# Patient Record
Sex: FEMALE | Race: ASIAN | Employment: FULL TIME | URBAN - METROPOLITAN AREA
[De-identification: names, ages, dates, MRNs, and addresses within clinical notes are randomized per-mention and may not be internally consistent; named-entity substitution may affect disease eponyms.]

---

## 2017-03-24 ENCOUNTER — OFFICE VISIT (OUTPATIENT)
Dept: FAMILY MEDICINE CLINIC | Age: 42
End: 2017-03-24

## 2017-03-24 VITALS
SYSTOLIC BLOOD PRESSURE: 112 MMHG | HEIGHT: 57 IN | TEMPERATURE: 98.1 F | OXYGEN SATURATION: 98 % | BODY MASS INDEX: 28.26 KG/M2 | WEIGHT: 131 LBS | HEART RATE: 92 BPM | RESPIRATION RATE: 14 BRPM | DIASTOLIC BLOOD PRESSURE: 82 MMHG

## 2017-03-24 DIAGNOSIS — Z13.220 SCREENING FOR LIPID DISORDERS: Primary | ICD-10-CM

## 2017-03-24 DIAGNOSIS — G93.9 BRAIN LESION: ICD-10-CM

## 2017-03-24 DIAGNOSIS — G43.909 MIGRAINE WITHOUT STATUS MIGRAINOSUS, NOT INTRACTABLE, UNSPECIFIED MIGRAINE TYPE: ICD-10-CM

## 2017-03-24 DIAGNOSIS — Z13.1 SCREENING FOR DIABETES MELLITUS: ICD-10-CM

## 2017-03-24 PROBLEM — Z22.7 LTBI (LATENT TUBERCULOSIS INFECTION): Status: ACTIVE | Noted: 2017-03-24

## 2017-03-24 NOTE — PATIENT INSTRUCTIONS

## 2017-03-24 NOTE — PROGRESS NOTES
Patient Name: Steve Lopez   MRN: [id number]    Luretha Hammans is a 46 y.o. female who presents with the following: Transferring care from prior PCP Dr. Danae Cyr. Cervical Cancer Screening: UTD, done by OBGYN, records requested. CAD risk factors:  HTN: WNL, no meds  Aspirin 81 mg: not taking  Lipid: due  Lab Results   Component Value Date/Time    Cholesterol, total 168 08/25/2015 02:55 PM    HDL Cholesterol 38 08/25/2015 02:55 PM    LDL, calculated 99 08/25/2015 02:55 PM    VLDL, calculated 31 08/25/2015 02:55 PM    Triglyceride 153 08/25/2015 02:55 PM     DM: due; hx of pre DM. Lab Results   Component Value Date/Time    Hemoglobin A1c 5.9 08/25/2015 02:55 PM     10 year ASCVD risk to date: Will calculate ASCVD risk score pending labs. Chronic headaches and brain mass  Patient reports twice a month headaches that are well controlled with Tylenol. She states that the intensity of her headaches are worse than prior. Denies numbness, vision changes, weakness, fevers. Previously saw neurology due to chronic headaches. MRI of brain showed nonspecific finding with slight rim enhancement. Last MRI in August 2015 with stable changes from prior MRI. Patient was supposed to follow-up in one year but has not done so. Review of Systems   Constitutional: Negative for fever, malaise/fatigue and weight loss. Respiratory: Negative for cough, hemoptysis, shortness of breath and wheezing. Cardiovascular: Negative for chest pain, palpitations, leg swelling and PND. Gastrointestinal: Negative for abdominal pain, constipation, diarrhea, nausea and vomiting. Neurological: Positive for headaches. Negative for dizziness, tingling, tremors, focal weakness, seizures and loss of consciousness. The patient's medications, allergies, past medical history, surgical history, family history and social history were reviewed and updated where appropriate.       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 Social History Main Topics    Smoking status: Never Smoker    Smokeless tobacco: Never Used    Alcohol use No    Drug use: No    Sexual activity: Yes     Partners: Male     Other Topics Concern    Not on file     Social History Narrative           OBJECTIVE    Visit Vitals    /82 (BP 1 Location: Left arm, BP Patient Position: Sitting)    Pulse 92    Temp 98.1 °F (36.7 °C) (Oral)    Resp 14    Ht 4' 9\" (1.448 m)    Wt 131 lb (59.4 kg)    LMP 2017 (Exact Date)    SpO2 98%    BMI 28.35 kg/m2       Physical Exam   Constitutional: She is oriented to person, place, and time and well-developed, well-nourished, and in no distress. No distress. HENT:   Head: Normocephalic and atraumatic. Right Ear: External ear normal.   Left Ear: External ear normal.   Eyes: Conjunctivae and EOM are normal. Pupils are equal, round, and reactive to light. Cardiovascular: Normal rate, regular rhythm and normal heart sounds. Exam reveals no gallop and no friction rub. No murmur heard. Pulmonary/Chest: Effort normal and breath sounds normal. No respiratory distress. She has no wheezes. Neurological: She is alert and oriented to person, place, and time. She has normal reflexes. She displays normal reflexes. No cranial nerve deficit. She exhibits normal muscle tone.  Gait normal. Coordination normal.   Skin: She is not diaphoretic. Psychiatric: Mood, memory, affect and judgment normal.   Nursing note and vitals reviewed. ASSESSMENT AND PLAN  Sussy Corrales is a 46 y.o. female who presents today for:    1. Screening for lipid disorders  Will calculate ASCVD risk score pending labs. - LIPID PANEL  - HEPATIC FUNCTION PANEL    2. Screening for diabetes mellitus  - HEMOGLOBIN A1C WITH EAG    3. Brain lesion  Recommend f/u with neurology for repeat MRI given hx of brain mass.  - REFERRAL TO NEUROLOGY    4. Migraine without status migrainosus, not intractable, unspecified migraine type  Pt reports worsening headaches in intensity but less frequent than prior; would appreciate neurology input.  - REFERRAL TO NEUROLOGY         Medications Discontinued During This Encounter   Medication Reason    methylPREDNISolone (MEDROL DOSEPACK) 4 mg tablet Duplicate Order    albuterol (PROVENTIL HFA, VENTOLIN HFA, PROAIR HFA) 90 mcg/actuation inhaler Not A Current Medication    ibuprofen (MOTRIN) 400 mg tablet Not A Current Medication    loratadine (CLARITIN) 10 mg tablet Not A Current Medication    acetaminophen (TYLENOL EXTRA STRENGTH) 500 mg tablet Not A Current Medication       Follow-up Disposition:  Return in about 1 year (around 3/24/2018) for CPE. Time: 25 minutes was spent with this patient face to face discussing test results, follow up visits, and when repeat testing. I discussed diagnoses, risk factors and treatment for each based on current recommendations and literature. Greater than 50% of total visit time was spent in counseling and coordination of care. Medication risks/benefits/costs/interactions/alternatives discussed with patient. Advised patient to call back or return to office if symptoms worsen/change/persist. If patient cannot reach us or should anything more severe/urgent arise he/she should proceed directly to the nearest emergency department.   Discussed expected course/resolution/complications of diagnosis in detail with patient. Patient given a written after visit summary which includes his/her diagnoses, current medications and vitals. Patient expressed understanding with the diagnosis and plan.      Barb Lind M.D.

## 2017-03-24 NOTE — MR AVS SNAPSHOT
Visit Information Date & Time Provider Department Dept. Phone Encounter #  
 3/24/2017  2:45 PM Lydia Gamboa MD [address] [place] [address] [address] [phone] [id number] Follow-up Instructions Return in about 1 year (around 3/24/2018) for CPE. Upcoming Health Maintenance Date Due  
 PAP AKA CERVICAL CYTOLOGY 4/15/1998 DTaP/Tdap/Td series (2 - Td) 2/19/2025 Allergies as of 3/24/2017  Review Complete On: 3/24/2017 By: Martha Holt LPN No Known Allergies Current Immunizations  Reviewed on 3/17/2015 Name Date Hep B Vaccine 6/24/2014, 1/17/2014, 12/20/2013 Influenza Vaccine 2/19/2015 Influenza Vaccine (Quad) PF 9/19/2016 MMR 1/17/2014, 12/20/2013 Td 2/19/2015 Tdap 6/26/2014 Not reviewed this visit You Were Diagnosed With   
  
 Codes Comments Screening for lipid disorders    -  Primary ICD-10-CM: Z13.220 ICD-9-CM: V77.91 Screening for diabetes mellitus     ICD-10-CM: Z13.1 ICD-9-CM: V77.1 Brain lesion     ICD-10-CM: G93.9 ICD-9-CM: 348.89 Migraine without status migrainosus, not intractable, unspecified migraine type     ICD-10-CM: G43.909 ICD-9-CM: 346.90 Vitals BP Pulse Temp Resp Height(growth percentile) Weight(growth percentile) 112/82 (BP 1 Location: Left arm, BP Patient Position: Sitting) 92 98.1 °F (36.7 °C) (Oral) 14 4' 9\" (1.448 m) 131 lb (59.4 kg) LMP SpO2 BMI OB Status Smoking Status 02/20/2017 (Exact Date) 98% 28.35 kg/m2 Having regular periods Never Smoker Vitals History BMI and BSA Data Body Mass Index Body Surface Area  
 28.35 kg/m 2 1.55 m 2 Preferred Pharmacy Pharmacy Name Phone [facility] [phone] [facility] [phone] Your Updated Medication List  
  
Notice  As of 3/24/2017  2:55 PM  
 You have not been prescribed any medications. We Performed the Following HEMOGLOBIN A1C WITH EAG [52954 CPT(R)] HEPATIC FUNCTION PANEL [33309 CPT(R)] LIPID PANEL [21378 CPT(R)] REFERRAL TO NEUROLOGY [LQT34 Custom] Comments:  
 Please evaluate patient for brain mass, headache. Patient prefers Fridays before 3pm.  
  
Follow-up Instructions Return in about 1 year (around 3/24/2018) for CPE. Referral Information Referral ID Referred By Referred To  
  
 [id number] [facility]ave[facility] Aw, [address] [facility] Margaret Zheng Phone: [phone] Fax: [phone] Visits Status Start Date End Date 1 New Request 3/24/17 3/24/18 If your referral has a status of pending review or denied, additional information will be sent to support the outcome of this decision. Patient Instructions Headache: Care Instructions Your Care Instructions Headaches have many possible causes. Most headaches aren't a sign of a more serious problem, and they will get better on their own. Home treatment may help you feel better faster. The doctor has checked you carefully, but problems can develop later. If you notice any problems or new symptoms, get medical treatment right away. Follow-up care is a key part of your treatment and safety. Be sure to make and go to all appointments, and call your doctor if you are having problems. It's also a good idea to know your test results and keep a list of the medicines you take. How can you care for yourself at home? · Do not drive if you have taken a prescription pain medicine. · Rest in a quiet, dark room until your headache is gone. Close your eyes and try to relax or go to sleep. Don't watch TV or read. · Put a cold, moist cloth or cold pack on the painful area for 10 to 20 minutes at a time. Put a thin cloth between the cold pack and your skin.  
· Use a warm, moist towel or a heating pad set on low to relax tight shoulder and neck muscles. · Have someone gently massage your neck and shoulders. · Take pain medicines exactly as directed. ¨ If the doctor gave you a prescription medicine for pain, take it as prescribed. ¨ If you are not taking a prescription pain medicine, ask your doctor if you can take an over-the-counter medicine. · Be careful not to take pain medicine more often than the instructions allow, because you may get worse or more frequent headaches when the medicine wears off. · Do not ignore new symptoms that occur with a headache, such as a fever, weakness or numbness, vision changes, or confusion. These may be signs of a more serious problem. To prevent headaches · Keep a headache diary so you can figure out what triggers your headaches. Avoiding triggers may help you prevent headaches. Record when each headache began, how long it lasted, and what the pain was like (throbbing, aching, stabbing, or dull). Write down any other symptoms you had with the headache, such as nausea, flashing lights or dark spots, or sensitivity to bright light or loud noise. Note if the headache occurred near your period. List anything that might have triggered the headache, such as certain foods (chocolate, cheese, wine) or odors, smoke, bright light, stress, or lack of sleep. · Find healthy ways to deal with stress. Headaches are most common during or right after stressful times. Take time to relax before and after you do something that has caused a headache in the past. 
· Try to keep your muscles relaxed by keeping good posture. Check your jaw, face, neck, and shoulder muscles for tension, and try relaxing them. When sitting at a desk, change positions often, and stretch for 30 seconds each hour. · Get plenty of sleep and exercise. · Eat regularly and well. Long periods without food can trigger a headache. · Treat yourself to a massage. Some people find that regular massages are very helpful in relieving tension. · Limit caffeine by not drinking too much coffee, tea, or soda. But don't quit caffeine suddenly, because that can also give you headaches. · Reduce eyestrain from computers by blinking frequently and looking away from the computer screen every so often. Make sure you have proper eyewear and that your monitor is set up properly, about an arm's length away. · Seek help if you have depression or anxiety. Your headaches may be linked to these conditions. Treatment can both prevent headaches and help with symptoms of anxiety or depression. When should you call for help? Call 911 anytime you think you may need emergency care. For example, call if: 
· You have signs of a stroke. These may include: 
¨ Sudden numbness, paralysis, or weakness in your face, arm, or leg, especially on only one side of your body. ¨ Sudden vision changes. ¨ Sudden trouble speaking. ¨ Sudden confusion or trouble understanding simple statements. ¨ Sudden problems with walking or balance. ¨ A sudden, severe headache that is different from past headaches. Call your doctor now or seek immediate medical care if: 
· You have a new or worse headache. · Your headache gets much worse. Where can you learn more? Go to http://gómez-william.info/. Enter M271 in the search box to learn more about \"Headache: Care Instructions. \" Current as of: February 19, 2016 Content Version: 11.1 © 4522-5373 Healthwise, Incorporated. Care instructions adapted under license by [employer] (which disclaims liability or warranty for this information). If you have questions about a medical condition or this instruction, always ask your healthcare professional. [place] [address] any warranty or liability for your use of this information. Introducing [facility]!    
 Denys Hall introduces Blackboard patient portal. Now you can access parts of your medical record, email your doctor's office, and request medication refills online. 1. In your internet browser, go to [email or web address] 2. Click on the First Time User? Click Here link in the Sign In box. You will see the New Member Sign Up page. 3. Enter your CensorNet Access Code exactly as it appears below. You will not need to use this code after youve completed the sign-up process. If you do not sign up before the expiration date, you must request a new code. · CensorNet Access Code: [email or web address] Expires: 6/22/2017  2:48 PM 
 
4. Enter the last four digits of your Social Security Number (xxxx) and Date of Birth (mm/dd/yyyy) as indicated and click Submit. You will be taken to the next sign-up page. 5. Create a CensorNet ID. This will be your CensorNet login ID and cannot be changed, so think of one that is secure and easy to remember. 6. Create a CensorNet password. You can change your password at any time. 7. Enter your Password Reset Question and Answer. This can be used at a later time if you forget your password. 8. Enter your e-mail address. You will receive e-mail notification when new information is available in [address] 9. Click Sign Up. You can now view and download portions of your medical record. 10. Click the Download Summary menu link to download a portable copy of your medical information. If you have questions, please visit the Frequently Asked Questions section of the CensorNet website. Remember, CensorNet is NOT to be used for urgent needs. For medical emergencies, dial 911. Now available from your iPhone and Android! Please provide this summary of care documentation to your next provider. Your primary care clinician is listed as Floyd Ventura. If you have any questions after today's visit, please call [phone].

## 2017-03-24 NOTE — PROGRESS NOTES
Patient presents in office today to establish [facility] transfer  Patient requesting cpe today    Chief Complaint   Patient presents with   [facility] Establish Care     [facility] transfer    Complete Physical     annual visit     1. Have you been to the ER, urgent care clinic since your last visit? Hospitalized since your last visit? No    2. Have you seen or consulted any other health care providers outside of the [address] [place] [address] since your last visit? Include any pap smears or colon screening.  No      PHQ 2 / 9, over the last two weeks 8/3/2016   Little interest or pleasure in doing things Not at all   Feeling down, depressed or hopeless Not at all   Total Score PHQ 2 0     Vitals:    03/24/17 1426   BP: 112/82   BP 1 Location: Left arm   BP Patient Position: Sitting   Pulse: 92   Resp: 14   Temp: 98.1 °F (36.7 °C)   TempSrc: Oral   SpO2: 98%   Weight: 131 lb (59.4 kg)   Height: 4' 9\" (1.448 m)

## 2017-03-27 LAB
ALBUMIN SERPL-MCNC: 4.2 G/DL (ref 3.5–5.5)
ALP SERPL-CCNC: 69 IU/L (ref 39–117)
ALT SERPL-CCNC: 14 IU/L (ref 0–32)
AST SERPL-CCNC: 13 IU/L (ref 0–40)
BILIRUB DIRECT SERPL-MCNC: 0.16 MG/DL (ref 0–0.4)
BILIRUB SERPL-MCNC: 0.7 MG/DL (ref 0–1.2)
CHOLEST SERPL-MCNC: 167 MG/DL (ref 100–199)
EST. AVERAGE GLUCOSE BLD GHB EST-MCNC: 117 MG/DL
HBA1C MFR BLD: 5.7 % (ref 4.8–5.6)
HDLC SERPL-MCNC: 50 MG/DL
INTERPRETATION, 910389: NORMAL
LDLC SERPL CALC-MCNC: 101 MG/DL (ref 0–99)
PROT SERPL-MCNC: 6.8 G/DL (ref 6–8.5)
TRIGL SERPL-MCNC: 82 MG/DL (ref 0–149)
VLDLC SERPL CALC-MCNC: 16 MG/DL (ref 5–40)

## 2017-03-27 NOTE — PROGRESS NOTES
Please notify patient regarding their test results (may need to use phone  or pt's  if he is on HIPAA):    Hemoglobin A1C (average blood sugar level for past 3 months) is in the pre diabetes range. I would encourage healthy diets and regular exercise with the goal of healthy weight loss before starting medications for this. Mildly elevated LDL but otherwise lipids look WNL. Pt needs to make appt with neurology as discussed given her brain mass; may need to assist in setting up this appointment.

## 2017-04-10 ENCOUNTER — OFFICE VISIT (OUTPATIENT)
Dept: NEUROLOGY | Age: 42
End: 2017-04-10

## 2017-04-10 DIAGNOSIS — G44.219 EPISODIC TENSION-TYPE HEADACHE, NOT INTRACTABLE: ICD-10-CM

## 2017-04-10 DIAGNOSIS — R93.89 NONSPECIFIC ABNORMAL FINDINGS ON DIAGNOSTIC IMAGING: Primary | ICD-10-CM

## 2017-04-10 NOTE — PROGRESS NOTES
Chief Complaint   Patient presents with    Migraine       HPI    53-year-old woman here to follow-up. I evaluated her last in October 2015. She was diagnosed with a cavernous angioma in 2014. I repeated imaging in 2015 showing stable appearance. She is here for follow-up in general.  Has occasional headaches. She complains of some left ankle pain with weightbearing activity and the same the right proximal thigh. No falls. Has some insomnia. She works at a nursing home. Her primary language is [place]. Review of Systems   Musculoskeletal: Positive for joint pain. Neurological: Positive for headaches. Psychiatric/Behavioral: The patient has insomnia. All other systems reviewed and are negative. 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 Social History Main Topics    Smoking status: Never Smoker    Smokeless tobacco: Never Used    Alcohol use No    Drug use: No    Sexual activity: Yes     Partners: Male     Other Topics Concern    Not on file     Social History Narrative     No Known Allergies      No current outpatient prescriptions on file. No current facility-administered medications for this visit. Neurologic Exam     Mental Status        WD/WN adult in NAD, normal grooming  VSS  A&O x 3    PERRL, nonicteric  Face is symmetric, tongue midline  Speech is fluent and clear  No limb ataxia. No abnl movements.   Moving all extemities spontaneously and symmetric  Normal gait    CVS RRR  Lungs nonlabored  Skin is warm and dry         Visit Vitals  LMP 02/20/2017 (Exact Date)       Assessment and Plan   Author Yfn was seen today for migraine. Diagnoses and all orders for this visit:    Nonspecific abnormal findings on diagnostic imaging  -     MRI BRAIN W WO CONT; Future    Episodic tension-type headache, not intractable      24-year-old woman with lesion suspicious for cavernous angioma based on previous imaging. She is complaining of some left ankle pain. English is not her primary language. She declined having to need an . Suspect the left ankle issue is due to mechanical etiology from walking and standing but given language barrier and the location of the brain lesion we need to have interval imaging done. Headaches seem to be rare. Points to vertex location. Exam is unchanged from last visit. I would like to see her after the imaging is done.           [address] [facility] [address]  Diplomate OBEDN

## 2017-04-10 NOTE — PATIENT INSTRUCTIONS
[facility] Formerly [facility]   Statement to Patients  April 1, 2014      In an effort to ensure the large volume of patient prescription refills is processed in the most efficient and expeditious manner, we are asking our patients to assist us by calling your Pharmacy for all prescription refills, this will include also your  Mail Order Pharmacy. The pharmacy will contact our office electronically to continue the refill process. Please do not wait until the last minute to call your pharmacy. We need at least 48 hours (2days) to fill prescriptions. We also encourage you to call your pharmacy before going to  your prescription to make sure it is ready. With regard to controlled substance prescription refill requests (narcotic refills) that need to be picked up at our office, we ask your cooperation by providing us with at least 72 hours (3days) notice that you will need a refill. We will not refill narcotic prescription refill requests after 4:00pm on any weekday, Monday through Thursday, or after 2:00pm on Fridays, or on the weekends. We encourage everyone to explore another way of getting your prescription refill request processed using [facility] [employer], our patient web portal through our electronic medical record system. [facility] is an efficient and effective way to communicate your medication request directly to the office and  downloadable as an lambert on your smart phone . [facility] also features a review functionality that allows you to view your medication list as well as leave messages for your physician. Are you ready to get connected? If so please review the attatched instructions or speak to any of our staff to get you set up right away! Thank you so much for your cooperation. Should you have any questions please contact our Practice Administrator.     The Physicians and Staff,  [facility]     Thank you for choosing [facility] for your     care. You may receive a survey about your visit. We appreciate you taking time     to complete this survey as we use your feedback to improve our services. We     realize we are not perfect, but we strive to provide excellent care.

## 2017-04-10 NOTE — MR AVS SNAPSHOT
Visit Information Date & Time Provider Department Dept. Phone Encounter #  
 4/10/2017 11:30 AM DO [facility] at [address] [id number] Follow-up Instructions Return in about 2 months (around 6/10/2017), or after MRI. Routing History Upcoming Health Maintenance Date Due  
 PAP AKA CERVICAL CYTOLOGY 4/15/1998 DTaP/Tdap/Td series (2 - Td) 2/19/2025 Allergies as of 4/10/2017  Review Complete On: 4/10/2017 By: Velia Mcintyre LPN No Known Allergies Current Immunizations  Reviewed on 3/17/2015 Name Date Hep B Vaccine 6/24/2014, 1/17/2014, 12/20/2013 Influenza Vaccine 2/19/2015 Influenza Vaccine (Quad) PF 9/19/2016 MMR 1/17/2014, 12/20/2013 Td 2/19/2015 Tdap 6/26/2014 Not reviewed this visit You Were Diagnosed With   
  
 Codes Comments Nonspecific abnormal findings on diagnostic imaging    -  Primary ICD-10-CM: R93.8 ICD-9-CM: 793.99 Episodic tension-type headache, not intractable     ICD-10-CM: S43.869 ICD-9-CM: 339.11 Vitals LMP OB Status Smoking Status 02/20/2017 (Exact Date) Having regular periods Never Smoker Preferred Pharmacy Pharmacy Name Phone [phone] [facility] [phone] Your Updated Medication List  
  
Notice  As of 4/10/2017 11:39 AM  
 You have not been prescribed any medications. Follow-up Instructions Return in about 2 months (around 6/10/2017), or after MRI. To-Do List   
 04/10/2017 Imaging:  MRI BRAIN W WO CONT Patient Instructions PRESCRIPTION REFILL POLICY [facility] Statement to Patients April 1, 2014 In an effort to ensure the large volume of patient prescription refills is processed in the most efficient and expeditious manner, we are asking our patients to assist us by calling your Pharmacy for all prescription refills, this will include also your  Mail Order Pharmacy. The pharmacy will contact our office electronically to continue the refill process. Please do not wait until the last minute to call your pharmacy. We need at least 48 hours (2days) to fill prescriptions. We also encourage you to call your pharmacy before going to  your prescription to make sure it is ready. With regard to controlled substance prescription refill requests (narcotic refills) that need to be picked up at our office, we ask your cooperation by providing us with at least 72 hours (3days) notice that you will need a refill. We will not refill narcotic prescription refill requests after 4:00pm on any weekday, Monday through Thursday, or after 2:00pm on Fridays, or on the weekends. We encourage everyone to explore another way of getting your prescription refill request processed using [employer], our patient web portal through our electronic medical record system. [employer] is an efficient and effective way to communicate your medication request directly to the office and  downloadable as an lambert on your smart phone . [employer] also features a review functionality that allows you to view your medication list as well as leave messages for your physician. Are you ready to get connected? If so please review the attatched instructions or speak to any of our staff to get you set up right away! Thank you so much for your cooperation. Should you have any questions please contact our Practice Administrator. The Physicians and Staff,  [facility] Thank you for choosing [facility] and [facility] for your  
 
care. You may receive a survey about your visit. We appreciate you taking time  
 
to complete this survey as we use your feedback to improve our services.   We  
 
 realize we are not perfect, but we strive to provide excellent care. Introducing [facility]! Elidia Marie introduces [employer] patient portal. Now you can access parts of your medical record, email your doctor's office, and request medication refills online. 1. In your internet browser, go to [email or web address] 2. Click on the First Time User? Click Here link in the Sign In box. You will see the New Member Sign Up page. 3. Enter your [employer] Access Code exactly as it appears below. You will not need to use this code after youve completed the sign-up process. If you do not sign up before the expiration date, you must request a new code. · [employer] Access Code: [email or web address] Expires: 6/22/2017  2:48 PM 
 
4. Enter the last four digits of your Social Security Number (xxxx) and Date of Birth (mm/dd/yyyy) as indicated and click Submit. You will be taken to the next sign-up page. 5. Create a [employer] ID. This will be your [employer] login ID and cannot be changed, so think of one that is secure and easy to remember. 6. Create a [employer] password. You can change your password at any time. 7. Enter your Password Reset Question and Answer. This can be used at a later time if you forget your password. 8. Enter your e-mail address. You will receive e-mail notification when new information is available in [address] 9. Click Sign Up. You can now view and download portions of your medical record. 10. Click the Download Summary menu link to download a portable copy of your medical information. If you have questions, please visit the Frequently Asked Questions section of the [employer] website. Remember, [employer] is NOT to be used for urgent needs. For medical emergencies, dial 911. Now available from your iPhone and Android! Please provide this summary of care documentation to your next provider. Your primary care clinician is listed as Floyd Ventura.  If you have any questions after today's visit, please call [phone].

## 2017-05-17 ENCOUNTER — TELEPHONE (OUTPATIENT)
Dept: FAMILY MEDICINE CLINIC | Age: 42
End: 2017-05-17

## 2017-05-17 DIAGNOSIS — Z02.1 PRE-EMPLOYMENT DRUG SCREENING: Primary | ICD-10-CM

## 2017-05-17 NOTE — TELEPHONE ENCOUNTER
Patients  came in yesterday afternoon and wanted us to fax over the patient's last Chest X-Ray, Lab Results and Urine Test over to fax #[phone]. He said it was important and was hoping to get it sent over as soon as possible. [place] let the patient know when it has been done. Contact number is [phone]. ( I have left the paper in doctors' box, with the fax number and to the place it needs to go to. [facility])Thanks.

## 2017-05-18 NOTE — TELEPHONE ENCOUNTER
Called pt, adv  (TAYA) that he could bring in wife to the lab to have her UDS. 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 Patient/ Caller given an opportunity to ask questions, repeated information, and verbalized understanding.

## 2017-05-18 NOTE — TELEPHONE ENCOUNTER
UDS order placed. Agree with plan to follow up with [facility] regarding TB management (would clarify if pt has active vs latent TB).

## 2017-05-18 NOTE — TELEPHONE ENCOUNTER
Return call from  (DORINDAA), adv him of information below. He stated that his wife is being treated by [facility] for TB, adv him that they would have updated records and possible xrays, etc. If she is being tx, then the school is going to want records from them as well. Gilbert would ask Dr. 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      Routing to PCP for approval/denial/alteration/dose adjustment

## 2017-05-18 NOTE — TELEPHONE ENCOUNTER
Called and LMTCB, her last urine was not for a drug screen nor her chest xray was for tuberculosis screening. Neither of these is appropriate for her to have to comply with the requirements.

## 2017-05-25 LAB — DRUGS UR: NORMAL

## 2017-05-30 NOTE — TELEPHONE ENCOUNTER
Please notify patient regarding their test results:    Negative UDS. Please provide copy of results to pt for employment form. If a form needs to be signed, please let me know.

## 2017-05-30 NOTE — TELEPHONE ENCOUNTER
Called pt, adv that letter could be picked up at  to provide to school along with form. Patient/ Caller given an opportunity to ask questions, repeated information, and verbalized understanding.

## 2017-06-21 ENCOUNTER — OFFICE VISIT (OUTPATIENT)
Dept: FAMILY MEDICINE CLINIC | Age: 42
End: 2017-06-21

## 2017-06-21 VITALS
HEART RATE: 90 BPM | TEMPERATURE: 97.9 F | HEIGHT: 57 IN | DIASTOLIC BLOOD PRESSURE: 96 MMHG | RESPIRATION RATE: 12 BRPM | WEIGHT: 127.4 LBS | BODY MASS INDEX: 27.49 KG/M2 | SYSTOLIC BLOOD PRESSURE: 129 MMHG | OXYGEN SATURATION: 93 %

## 2017-06-21 DIAGNOSIS — G44.219 EPISODIC TENSION-TYPE HEADACHE, NOT INTRACTABLE: Primary | ICD-10-CM

## 2017-06-21 DIAGNOSIS — R03.0 ELEVATED BLOOD PRESSURE READING: ICD-10-CM

## 2017-06-21 DIAGNOSIS — G47.00 INSOMNIA, UNSPECIFIED TYPE: ICD-10-CM

## 2017-06-21 RX ORDER — TRAZODONE HYDROCHLORIDE 50 MG/1
50 TABLET ORAL
Qty: 30 TAB | Refills: 5 | Status: SHIPPED | OUTPATIENT
Start: 2017-06-21 | End: 2017-09-29

## 2017-06-21 NOTE — PATIENT INSTRUCTIONS
Elevated Blood Pressure: Care Instructions  Your Care Instructions    Blood pressure is a measure of how hard the blood pushes against the walls of your arteries. It's normal for blood pressure to go up and down throughout the day. But if it stays up over time, you have high blood pressure. Two numbers tell you your blood pressure. The first number is the systolic pressure. It shows how hard the blood pushes when your heart is pumping. The second number is the diastolic pressure. It shows how hard the blood pushes between heartbeats, when your heart is relaxed and filling with blood. An ideal blood pressure in adults is less than 120/80 (say \"120 over 80\"). High blood pressure is 140/90 or higher. You have high blood pressure if your top number is 140 or higher or your bottom number is 90 or higher, or both. The main test for high blood pressure is simple, fast, and painless. To diagnose high blood pressure, your doctor will test your blood pressure at different times. After testing your blood pressure, your doctor may ask you to test it again when you are home. If you are diagnosed with high blood pressure, you can work with your doctor to make a long-term plan to manage it. Follow-up care is a key part of your treatment and safety. Be sure to make and go to all appointments, and call your doctor if you are having problems. It's also a good idea to know your test results and keep a list of the medicines you take. How can you care for yourself at home? · Do not smoke. Smoking increases your risk for heart attack and stroke. If you need help quitting, talk to your doctor about stop-smoking programs and medicines. These can increase your chances of quitting for good. · Stay at a healthy weight. · Try to limit how much sodium you eat to less than 2,300 milligrams (mg) a day. Your doctor may ask you to try to eat less than 1,500 mg a day. · Be physically active.  Get at least 30 minutes of exercise on most days of the week. Walking is a good choice. You also may want to do other activities, such as running, swimming, cycling, or playing tennis or team sports. · Avoid or limit alcohol. Talk to your doctor about whether you can drink any alcohol. · Eat plenty of fruits, vegetables, and low-fat dairy products. Eat less saturated and total fats. · Learn how to check your blood pressure at home. When should you call for help? Call your doctor now or seek immediate medical care if:  · Your blood pressure is much higher than normal (such as 180/110 or higher). · You think high blood pressure is causing symptoms such as:  ¨ Severe headache. ¨ Blurry vision. Watch closely for changes in your health, and be sure to contact your doctor if:  · You do not get better as expected. Where can you learn more? Go to http://gómez-william.info/. Enter G441 in the search box to learn more about \"Elevated Blood Pressure: Care Instructions. \"  Current as of: April 3, 2017  Content Version: 11.3  © 9218-9022 [employer]. Care instructions adapted under license by [employer] (which disclaims liability or warranty for this information). If you have questions about a medical condition or this instruction, always ask your healthcare professional. [address] any warranty or liability for your use of this information.

## 2017-06-21 NOTE — PROGRESS NOTES
1. Have you been to the ER, urgent care clinic since your last visit? Hospitalized since your last visit? No    2. Have you seen or consulted any other health care providers outside of the [address] [place] [address] since your last visit? Include any pap smears or colon screening.  No       Chief Complaint   Patient presents with    Fever     had a low grade fever 6/20/17    Hypertension     has had a mild headache for the last 2-3 days blood pressure has been elevated    Chest Pain     for the last 2-3 days

## 2017-06-21 NOTE — MR AVS SNAPSHOT
Visit Information Date & Time Provider Department Dept. Phone Encounter #  
 6/21/2017  8:45 AM Xuan Snider NP [facility] [phone] [id number] Follow-up Instructions Return in about 4 weeks (around 7/19/2017) for blood pressure. Upcoming Health Maintenance Date Due  
 PAP AKA CERVICAL CYTOLOGY 4/15/1998 INFLUENZA AGE 9 TO ADULT 8/1/2017 DTaP/Tdap/Td series (2 - Td) 2/19/2025 Allergies as of 6/21/2017  Review Complete On: 6/21/2017 By: Xuan Snider NP No Known Allergies Current Immunizations  Reviewed on 3/17/2015 Name Date Hep B Vaccine 6/24/2014, 1/17/2014, 12/20/2013 Influenza Vaccine 2/19/2015 Influenza Vaccine (Quad) PF 9/19/2016 MMR 1/17/2014, 12/20/2013 Td 2/19/2015 Tdap 6/26/2014 Not reviewed this visit You Were Diagnosed With   
  
 Codes Comments Episodic tension-type headache, not intractable    -  Primary ICD-10-CM: W05.986 ICD-9-CM: 339.11 Elevated blood pressure reading     ICD-10-CM: R03.0 ICD-9-CM: 796.2 Insomnia, unspecified type     ICD-10-CM: G47.00 ICD-9-CM: 780.52 Vitals BP Pulse Temp Resp Height(growth percentile) Weight(growth percentile) (!) 129/96 (BP 1 Location: Right arm, BP Patient Position: Sitting) 90 97.9 °F (36.6 °C) (Oral) 12 4' 9\" (1.448 m) 127 lb 6.4 oz (57.8 kg) LMP SpO2 BMI OB Status Smoking Status 05/12/2017 (Exact Date) 93% 27.57 kg/m2 Having regular periods Never Smoker Vitals History BMI and BSA Data Body Mass Index Body Surface Area  
 27.57 kg/m 2 1.52 m 2 Preferred Pharmacy Pharmacy Name Phone [phone] [facility] [phone] Your Updated Medication List  
  
   
This list is accurate as of: 6/21/17  9:26 AM.  Always use your most recent med list.  
  
  
  
  
 traZODone 50 mg tablet Commonly known as:  Shai Lim  
 Take 1 Tab by mouth nightly. For sleep Prescriptions Sent to Pharmacy Refills  
 traZODone (DESYREL) 50 mg tablet 5 Sig: Take 1 Tab by mouth nightly. For sleep Class: Normal  
 Pharmacy: [facility] [address] [facility] #: [phone] Route: Oral  
  
Follow-up Instructions Return in about 4 weeks (around 7/19/2017) for blood pressure. Patient Instructions Elevated Blood Pressure: Care Instructions Your Care Instructions Blood pressure is a measure of how hard the blood pushes against the walls of your arteries. It's normal for blood pressure to go up and down throughout the day. But if it stays up over time, you have high blood pressure. Two numbers tell you your blood pressure. The first number is the systolic pressure. It shows how hard the blood pushes when your heart is pumping. The second number is the diastolic pressure. It shows how hard the blood pushes between heartbeats, when your heart is relaxed and filling with blood. An ideal blood pressure in adults is less than 120/80 (say \"120 over 80\"). High blood pressure is 140/90 or higher. You have high blood pressure if your top number is 140 or higher or your bottom number is 90 or higher, or both. The main test for high blood pressure is simple, fast, and painless. To diagnose high blood pressure, your doctor will test your blood pressure at different times. After testing your blood pressure, your doctor may ask you to test it again when you are home. If you are diagnosed with high blood pressure, you can work with your doctor to make a long-term plan to manage it. Follow-up care is a key part of your treatment and safety. Be sure to make and go to all appointments, and call your doctor if you are having problems. It's also a good idea to know your test results and keep a list of the medicines you take. How can you care for yourself at home? · Do not smoke. Smoking increases your risk for heart attack and stroke. If you need help quitting, talk to your doctor about stop-smoking programs and medicines. These can increase your chances of quitting for good. · Stay at a healthy weight. · Try to limit how much sodium you eat to less than 2,300 milligrams (mg) a day. Your doctor may ask you to try to eat less than 1,500 mg a day. · Be physically active. Get at least 30 minutes of exercise on most days of the week. Walking is a good choice. You also may want to do other activities, such as running, swimming, cycling, or playing tennis or team sports. · Avoid or limit alcohol. Talk to your doctor about whether you can drink any alcohol. · Eat plenty of fruits, vegetables, and low-fat dairy products. Eat less saturated and total fats. · Learn how to check your blood pressure at home. When should you call for help? Call your doctor now or seek immediate medical care if: 
· Your blood pressure is much higher than normal (such as 180/110 or higher). · You think high blood pressure is causing symptoms such as: ¨ Severe headache. ¨ Blurry vision. Watch closely for changes in your health, and be sure to contact your doctor if: 
· You do not get better as expected. Where can you learn more? Go to http://gómez-william.info/. Enter W185 in the search box to learn more about \"Elevated Blood Pressure: Care Instructions. \" Current as of: April 3, 2017 Content Version: 11.3 © 0497-4361 [employer]. Care instructions adapted under license by [employer] (which disclaims liability or warranty for this information). If you have questions about a medical condition or this instruction, always ask your healthcare professional. [place] [address] any warranty or liability for your use of this information. Introducing [facility]! Mickey Porras introduces [employer] patient portal. Now you can access parts of your medical record, email your doctor's office, and request medication refills online. 1. In your internet browser, go to [email or web address] 2. Click on the First Time User? Click Here link in the Sign In box. You will see the New Member Sign Up page. 3. Enter your [employer] Access Code exactly as it appears below. You will not need to use this code after youve completed the sign-up process. If you do not sign up before the expiration date, you must request a new code. · [employer] Access Code: [email or web address] Expires: 6/22/2017  2:48 PM 
 
4. Enter the last four digits of your Social Security Number (xxxx) and Date of Birth (mm/dd/yyyy) as indicated and click Submit. You will be taken to the next sign-up page. 5. Create a [employer] ID. This will be your [employer] login ID and cannot be changed, so think of one that is secure and easy to remember. 6. Create a [employer] password. You can change your password at any time. 7. Enter your Password Reset Question and Answer. This can be used at a later time if you forget your password. 8. Enter your e-mail address. You will receive e-mail notification when new information is available in [address] 9. Click Sign Up. You can now view and download portions of your medical record. 10. Click the Download Summary menu link to download a portable copy of your medical information. If you have questions, please visit the Frequently Asked Questions section of the [employer] website. Remember, [employer] is NOT to be used for urgent needs. For medical emergencies, dial 911. Now available from your iPhone and Android! Please provide this summary of care documentation to your next provider. Your primary care clinician is listed as Floyd Ventura. If you have any questions after today's visit, please call [phone].

## 2017-06-29 ENCOUNTER — OFFICE VISIT (OUTPATIENT)
Dept: FAMILY MEDICINE CLINIC | Age: 42
End: 2017-06-29

## 2017-06-29 VITALS
WEIGHT: 127.13 LBS | HEIGHT: 57 IN | BODY MASS INDEX: 27.43 KG/M2 | HEART RATE: 100 BPM | SYSTOLIC BLOOD PRESSURE: 128 MMHG | OXYGEN SATURATION: 98 % | RESPIRATION RATE: 18 BRPM | DIASTOLIC BLOOD PRESSURE: 88 MMHG | TEMPERATURE: 98.4 F

## 2017-06-29 DIAGNOSIS — R51.9 HEADACHE, UNSPECIFIED HEADACHE TYPE: Primary | ICD-10-CM

## 2017-06-29 DIAGNOSIS — G93.9 BRAIN LESION: ICD-10-CM

## 2017-06-29 NOTE — PATIENT INSTRUCTIONS
Bandar Whiting, [facility], [address], [address]  Phone: [phone]  Fax: [phone]         Headache: Care Instructions  Your Care Instructions    Headaches have many possible causes. Most headaches aren't a sign of a more serious problem, and they will get better on their own. Home treatment may help you feel better faster. The doctor has checked you carefully, but problems can develop later. If you notice any problems or new symptoms, get medical treatment right away. Follow-up care is a key part of your treatment and safety. Be sure to make and go to all appointments, and call your doctor if you are having problems. It's also a good idea to know your test results and keep a list of the medicines you take. How can you care for yourself at home? · Do not drive if you have taken a prescription pain medicine. · Rest in a quiet, dark room until your headache is gone. Close your eyes and try to relax or go to sleep. Don't watch TV or read. · Put a cold, moist cloth or cold pack on the painful area for 10 to 20 minutes at a time. Put a thin cloth between the cold pack and your skin. · Use a warm, moist towel or a heating pad set on low to relax tight shoulder and neck muscles. · Have someone gently massage your neck and shoulders. · Take pain medicines exactly as directed. ¨ If the doctor gave you a prescription medicine for pain, take it as prescribed. ¨ If you are not taking a prescription pain medicine, ask your doctor if you can take an over-the-counter medicine. · Be careful not to take pain medicine more often than the instructions allow, because you may get worse or more frequent headaches when the medicine wears off. · Do not ignore new symptoms that occur with a headache, such as a fever, weakness or numbness, vision changes, or confusion. These may be signs of a more serious problem.   To prevent headaches  · Keep a headache diary so you can figure out what triggers your headaches. Avoiding triggers may help you prevent headaches. Record when each headache began, how long it lasted, and what the pain was like (throbbing, aching, stabbing, or dull). Write down any other symptoms you had with the headache, such as nausea, flashing lights or dark spots, or sensitivity to bright light or loud noise. Note if the headache occurred near your period. List anything that might have triggered the headache, such as certain foods (chocolate, cheese, wine) or odors, smoke, bright light, stress, or lack of sleep. · Find healthy ways to deal with stress. Headaches are most common during or right after stressful times. Take time to relax before and after you do something that has caused a headache in the past.  · Try to keep your muscles relaxed by keeping good posture. Check your jaw, face, neck, and shoulder muscles for tension, and try relaxing them. When sitting at a desk, change positions often, and stretch for 30 seconds each hour. · Get plenty of sleep and exercise. · Eat regularly and well. Long periods without food can trigger a headache. · Treat yourself to a massage. Some people find that regular massages are very helpful in relieving tension. · Limit caffeine by not drinking too much coffee, tea, or soda. But don't quit caffeine suddenly, because that can also give you headaches. · Reduce eyestrain from computers by blinking frequently and looking away from the computer screen every so often. Make sure you have proper eyewear and that your monitor is set up properly, about an arm's length away. · Seek help if you have depression or anxiety. Your headaches may be linked to these conditions. Treatment can both prevent headaches and help with symptoms of anxiety or depression. When should you call for help? Call 911 anytime you think you may need emergency care. For example, call if:  · You have signs of a stroke.  These may include:  ¨ Sudden numbness, paralysis, or weakness in your face, arm, or leg, especially on only one side of your body. ¨ Sudden vision changes. ¨ Sudden trouble speaking. ¨ Sudden confusion or trouble understanding simple statements. ¨ Sudden problems with walking or balance. ¨ A sudden, severe headache that is different from past headaches. Call your doctor now or seek immediate medical care if:  · You have a new or worse headache. · Your headache gets much worse. Where can you learn more? Go to http://gómez-william.info/. Enter M271 in the search box to learn more about \"Headache: Care Instructions. \"  Current as of: October 14, 2016  Content Version: 11.3  © 1792-4173 [employer], [employer]. Care instructions adapted under license by [employer] (which disclaims liability or warranty for this information). If you have questions about a medical condition or this instruction, always ask your healthcare professional. [address] any warranty or liability for your use of this information.

## 2017-06-29 NOTE — MR AVS SNAPSHOT
Visit Information Date & Time Provider Department Dept. Phone Encounter #  
 6/29/2017 11:00 AM Cara Whitmore  [facility] [address] [phone] [id number] Follow-up Instructions Return if symptoms worsen or fail to improve. Your Appointments 7/19/2017  8:30 AM  
ROUTINE CARE with Esvin Hobson  [facility] ([facility]) Appt Note: 1 month follow up visit htn  
 [address] [id number]  
[phone]  
  
   
 [address] [address] Upcoming Health Maintenance Date Due  
 PAP AKA CERVICAL CYTOLOGY 4/15/1998 INFLUENZA AGE 9 TO ADULT 8/1/2017 DTaP/Tdap/Td series (2 - Td) 2/19/2025 Allergies as of 6/29/2017  Review Complete On: 6/29/2017 By: Mercedes Mckinley LPN No Known Allergies Current Immunizations  Reviewed on 6/29/2017 Name Date Hep B Vaccine 6/24/2014, 1/17/2014, 12/20/2013 Influenza Vaccine 2/19/2015 Influenza Vaccine (Quad) PF 9/19/2016 MMR 1/17/2014, 12/20/2013 Td 2/19/2015, 12/20/2013 Tdap 6/26/2014 Reviewed by Mercedes Mckinley LPN on 9/80/0506 at  9:18 AM  
Vitals BP Pulse Temp Resp Height(growth percentile) Weight(growth percentile) 128/88 (BP 1 Location: Left arm, BP Patient Position: Sitting) 100 98.4 °F (36.9 °C) (Oral) 18 4' 9\" (1.448 m) 127 lb 2 oz (57.7 kg) LMP SpO2 BMI OB Status Smoking Status 05/12/2017 (Exact Date) 98% 27.51 kg/m2 Having regular periods Never Smoker BMI and BSA Data Body Mass Index Body Surface Area  
 27.51 kg/m 2 1.52 m 2 Preferred Pharmacy Pharmacy Name Phone [place] [phone] [facility] [phone] Your Updated Medication List  
  
   
This list is accurate as of: 6/29/17 11:25 AM.  Always use your most recent med list.  
  
  
  
  
 traZODone 50 mg tablet Commonly known as:  Krupa Flores  
 Take 1 Tab by mouth nightly. For sleep Follow-up Instructions Return if symptoms worsen or fail to improve. Patient Instructions Sangeeta Bucknerkathleen DO Isaacmimartín [facility] 
[address] [address], [address] Margaret Richardson Phone: [phone] Fax: [phone] Headache: Care Instructions Your Care Instructions Headaches have many possible causes. Most headaches aren't a sign of a more serious problem, and they will get better on their own. Home treatment may help you feel better faster. The doctor has checked you carefully, but problems can develop later. If you notice any problems or new symptoms, get medical treatment right away. Follow-up care is a key part of your treatment and safety. Be sure to make and go to all appointments, and call your doctor if you are having problems. It's also a good idea to know your test results and keep a list of the medicines you take. How can you care for yourself at home? · Do not drive if you have taken a prescription pain medicine. · Rest in a quiet, dark room until your headache is gone. Close your eyes and try to relax or go to sleep. Don't watch TV or read. · Put a cold, moist cloth or cold pack on the painful area for 10 to 20 minutes at a time. Put a thin cloth between the cold pack and your skin. · Use a warm, moist towel or a heating pad set on low to relax tight shoulder and neck muscles. · Have someone gently massage your neck and shoulders. · Take pain medicines exactly as directed. ¨ If the doctor gave you a prescription medicine for pain, take it as prescribed. ¨ If you are not taking a prescription pain medicine, ask your doctor if you can take an over-the-counter medicine. · Be careful not to take pain medicine more often than the instructions allow, because you may get worse or more frequent headaches when the medicine wears off. · Do not ignore new symptoms that occur with a headache, such as a fever, weakness or numbness, vision changes, or confusion. These may be signs of a more serious problem. To prevent headaches · Keep a headache diary so you can figure out what triggers your headaches. Avoiding triggers may help you prevent headaches. Record when each headache began, how long it lasted, and what the pain was like (throbbing, aching, stabbing, or dull). Write down any other symptoms you had with the headache, such as nausea, flashing lights or dark spots, or sensitivity to bright light or loud noise. Note if the headache occurred near your period. List anything that might have triggered the headache, such as certain foods (chocolate, cheese, wine) or odors, smoke, bright light, stress, or lack of sleep. · Find healthy ways to deal with stress. Headaches are most common during or right after stressful times. Take time to relax before and after you do something that has caused a headache in the past. 
· Try to keep your muscles relaxed by keeping good posture. Check your jaw, face, neck, and shoulder muscles for tension, and try relaxing them. When sitting at a desk, change positions often, and stretch for 30 seconds each hour. · Get plenty of sleep and exercise. · Eat regularly and well. Long periods without food can trigger a headache. · Treat yourself to a massage. Some people find that regular massages are very helpful in relieving tension. · Limit caffeine by not drinking too much coffee, tea, or soda. But don't quit caffeine suddenly, because that can also give you headaches. · Reduce eyestrain from computers by blinking frequently and looking away from the computer screen every so often. Make sure you have proper eyewear and that your monitor is set up properly, about an arm's length away. · Seek help if you have depression or anxiety.  Your headaches may be linked to these conditions. Treatment can both prevent headaches and help with symptoms of anxiety or depression. When should you call for help? Call 911 anytime you think you may need emergency care. For example, call if: 
· You have signs of a stroke. These may include: 
¨ Sudden numbness, paralysis, or weakness in your face, arm, or leg, especially on only one side of your body. ¨ Sudden vision changes. ¨ Sudden trouble speaking. ¨ Sudden confusion or trouble understanding simple statements. ¨ Sudden problems with walking or balance. ¨ A sudden, severe headache that is different from past headaches. Call your doctor now or seek immediate medical care if: 
· You have a new or worse headache. · Your headache gets much worse. Where can you learn more? Go to http://gómez-william.info/. Enter M271 in the search box to learn more about \"Headache: Care Instructions. \" Current as of: October 14, 2016 Content Version: 11.3 © 6274-2597 BioWizard. Care instructions adapted under license by [facility] (which disclaims liability or warranty for this information). If you have questions about a medical condition or this instruction, always ask your healthcare professional. [address] [place] [address] any warranty or liability for your use of this information. Introducing [facility]! Rod Sheppard introduces Chemclin patient portal. Now you can access parts of your medical record, email your doctor's office, and request medication refills online. 1. In your internet browser, go to [email or web address] 2. Click on the First Time User? Click Here link in the Sign In box. You will see the New Member Sign Up page. 3. Enter your Chemclin Access Code exactly as it appears below. You will not need to use this code after youve completed the sign-up process. If you do not sign up before the expiration date, you must request a new code. · Xenith Access Code: [email or web address] 
Expires: 9/27/2017 11:25 AM 
 
4. Enter the last four digits of your Social Security Number (xxxx) and Date of Birth (mm/dd/yyyy) as indicated and click Submit. You will be taken to the next sign-up page. 5. Create a Xenith ID. This will be your Xenith login ID and cannot be changed, so think of one that is secure and easy to remember. 6. Create a Xenith password. You can change your password at any time. 7. Enter your Password Reset Question and Answer. This can be used at a later time if you forget your password. 8. Enter your e-mail address. You will receive e-mail notification when new information is available in [address] 9. Click Sign Up. You can now view and download portions of your medical record. 10. Click the Download Summary menu link to download a portable copy of your medical information. If you have questions, please visit the Frequently Asked Questions section of the Xenith website. Remember, Xenith is NOT to be used for urgent needs. For medical emergencies, dial 911. Now available from your iPhone and Android! Please provide this summary of care documentation to your next provider. Your primary care clinician is listed as Floyd Ventura. If you have any questions after today's visit, please call [phone].

## 2017-06-29 NOTE — LETTER
NOTIFICATION RETURN TO WORK / SCHOOL 
 
6/29/2017 11:23 AM 
 
Ms. Kylie Herrera [address] [facility] [address] [phone] To Whom It May Concern: 
 
Kylie Herrera is currently under the care of DAVY Umanzor. She will return to work/school on: 6/29/2017 If there are questions or concerns please have the patient contact our office. Sincerely, Veronica Alberto MD

## 2017-06-29 NOTE — PROGRESS NOTES
.ccc  1. Have you been to the ER, urgent care clinic since your last visit? Hospitalized since your last visit? [facility] 06/25/17 dx viral infection. 2. Have you seen or consulted any other health care providers outside of the [facility] since your last visit? Include any pap smears or colon screening.  no

## 2017-06-29 NOTE — PROGRESS NOTES
Patient Name: Kwesi Echevarria   MRN: [id number]    Vilma Alba is a 38 y.o. female who presents with the following:     Headache and dizziness  Patient reports that on Sunday, she felt very dizzy and had a bad headache. [facility]. States that she otherwise feels well currently but woke up this morning and her blood pressure was 140/100 and she was concerned about high blood pressure. No history of hypertension. She does have a history of a cavernous angioma which is followed by neurology. Has had intermittent headaches in the past but are otherwise well controlled. Has not completed the MRI that her neurologist previously ordered. Review of Systems   Constitutional: Negative for fever, malaise/fatigue and weight loss. Respiratory: Negative for cough, hemoptysis, shortness of breath and wheezing. Cardiovascular: Negative for chest pain, palpitations, leg swelling and PND. Gastrointestinal: Negative for abdominal pain, constipation, diarrhea, nausea and vomiting. The patient's medications, allergies, past medical history, surgical history, family history and social history were reviewed and updated where appropriate. Prior to Admission medications    Medication Sig Start Date End Date Taking? Authorizing Provider   traZODone (DESYREL) 50 mg tablet Take 1 Tab by mouth nightly. For sleep 6/21/17  Yes Brie Kelly, NP       No Known Allergies        OBJECTIVE    Visit Vitals    /88 (BP 1 Location: Left arm, BP Patient Position: Sitting)    Pulse 100    Temp 98.4 °F (36.9 °C) (Oral)    Resp 18    Ht 4' 9\" (1.448 m)    Wt 127 lb 2 oz (57.7 kg)    LMP 05/12/2017 (Exact Date)    SpO2 98%    BMI 27.51 kg/m2       Physical Exam   Constitutional: She is oriented to person, place, and time and well-developed, well-nourished, and in no distress. No distress. HENT:   Head: Normocephalic and atraumatic.    Right Ear: External ear normal.   Left Ear: External ear normal.   Eyes: Conjunctivae and EOM are normal. Pupils are equal, round, and reactive to light. Neurological: She is alert and oriented to person, place, and time. She has intact cranial nerves. She exhibits normal muscle tone. Gait normal. Gait normal.   Skin: She is not diaphoretic. Psychiatric: Mood, memory, affect and judgment normal.   Nursing note and vitals reviewed. ASSESSMENT AND PLAN  Savannah Alston is a 38 y.o. female who presents today for:    1. Headache, unspecified headache type  Possible due to recent viral URI or tension headache. Pt asymptomatic at this time. Reassured that BP was normal. Recommend adequate hydration and prn Tylenol. 2. Brain lesion  Pt to follow up to complete MRI as ordered through neurology. There are no discontinued medications. Follow-up Disposition:  Return if symptoms worsen or fail to improve. Medication risks/benefits/costs/interactions/alternatives discussed with patient. Advised patient to call back or return to office if symptoms worsen/change/persist. If patient cannot reach us or should anything more severe/urgent arise he/she should proceed directly to the nearest emergency department. Discussed expected course/resolution/complications of diagnosis in detail with patient. Patient given a written after visit summary which includes his/her diagnoses, current medications and vitals. Patient expressed understanding with the diagnosis and plan.      Jus Ruvalcaba M.D.

## 2017-09-12 ENCOUNTER — HOSPITAL ENCOUNTER (OUTPATIENT)
Dept: MRI IMAGING | Age: 42
Discharge: HOME | End: 2017-09-12
Attending: PSYCHIATRY & NEUROLOGY
Payer: COMMERCIAL

## 2017-09-12 VITALS — BODY MASS INDEX: 29.86 KG/M2 | WEIGHT: 138 LBS

## 2017-09-12 DIAGNOSIS — R93.89 NONSPECIFIC ABNORMAL FINDINGS ON DIAGNOSTIC IMAGING: ICD-10-CM

## 2017-09-12 PROCEDURE — 74011250636 HC RX REV CODE- 250/636: Performed by: PSYCHIATRY & NEUROLOGY

## 2017-09-12 PROCEDURE — 70553 MRI BRAIN STEM W/O & W/DYE: CPT

## 2017-09-12 PROCEDURE — A9576 INJ PROHANCE MULTIPACK: HCPCS | Performed by: PSYCHIATRY & NEUROLOGY

## 2017-09-12 RX ADMIN — GADOTERIDOL 12 ML: 279.3 INJECTION, SOLUTION INTRAVENOUS at 16:38

## 2017-09-29 ENCOUNTER — OFFICE VISIT (OUTPATIENT)
Dept: FAMILY MEDICINE CLINIC | Age: 42
End: 2017-09-29

## 2017-09-29 VITALS
TEMPERATURE: 98.2 F | BODY MASS INDEX: 28.05 KG/M2 | HEIGHT: 57 IN | HEART RATE: 90 BPM | OXYGEN SATURATION: 97 % | WEIGHT: 130 LBS | RESPIRATION RATE: 18 BRPM | DIASTOLIC BLOOD PRESSURE: 78 MMHG | SYSTOLIC BLOOD PRESSURE: 122 MMHG

## 2017-09-29 DIAGNOSIS — J06.9 UPPER RESPIRATORY TRACT INFECTION, UNSPECIFIED TYPE: Primary | ICD-10-CM

## 2017-09-29 NOTE — PATIENT INSTRUCTIONS
Take zinc lozenges (Cold-Eeze) as directed, May take Tylenol or Advil as needed for body aches, Use a humidifier at night, Stay well hydrated with water         Upper Respiratory Infection (Cold): Care Instructions  Your Care Instructions    An upper respiratory infection, or URI, is an infection of the nose, sinuses, or throat. URIs are spread by coughs, sneezes, and direct contact. The common cold is the most frequent kind of URI. The flu and sinus infections are other kinds of URIs. Almost all URIs are caused by viruses. Antibiotics won't cure them. But you can treat most infections with home care. This may include drinking lots of fluids and taking over-the-counter pain medicine. You will probably feel better in 4 to 10 days. The doctor has checked you carefully, but problems can develop later. If you notice any problems or new symptoms, get medical treatment right away. Follow-up care is a key part of your treatment and safety. Be sure to make and go to all appointments, and call your doctor if you are having problems. It's also a good idea to know your test results and keep a list of the medicines you take. How can you care for yourself at home? · To prevent dehydration, drink plenty of fluids, enough so that your urine is light yellow or clear like water. Choose water and other caffeine-free clear liquids until you feel better. If you have kidney, heart, or liver disease and have to limit fluids, talk with your doctor before you increase the amount of fluids you drink. · Take an over-the-counter pain medicine, such as acetaminophen (Tylenol), ibuprofen (Advil, Motrin), or naproxen (Aleve). Read and follow all instructions on the label. · Before you use cough and cold medicines, check the label. These medicines may not be safe for young children or for people with certain health problems. · Be careful when taking over-the-counter cold or flu medicines and Tylenol at the same time.  Many of these medicines have acetaminophen, which is Tylenol. Read the labels to make sure that you are not taking more than the recommended dose. Too much acetaminophen (Tylenol) can be harmful. · Get plenty of rest.  · Do not smoke or allow others to smoke around you. If you need help quitting, talk to your doctor about stop-smoking programs and medicines. These can increase your chances of quitting for good. When should you call for help? Call 911 anytime you think you may need emergency care. For example, call if:  · You have severe trouble breathing. Call your doctor now or seek immediate medical care if:  · You seem to be getting much sicker. · You have new or worse trouble breathing. · You have a new or higher fever. · You have a new rash. Watch closely for changes in your health, and be sure to contact your doctor if:  · You have a new symptom, such as a sore throat, an earache, or sinus pain. · You cough more deeply or more often, especially if you notice more mucus or a change in the color of your mucus. · You do not get better as expected. Where can you learn more? Go to http://gómez-william.info/. Enter D761 in the search box to learn more about \"Upper Respiratory Infection (Cold): Care Instructions. \"  Current as of: March 25, 2017  Content Version: 11.3  © 5081-3572 Healthwise, [employer]. Care instructions adapted under license by [employer] (which disclaims liability or warranty for this information). If you have questions about a medical condition or this instruction, always ask your healthcare professional. [place] [address] any warranty or liability for your use of this information.

## 2017-09-29 NOTE — LETTER
NOTIFICATION RETURN TO WORK / SCHOOL 
 
9/29/2017 9:59 AM 
 
Ms. Sushma Lopez [address] [facility] [facility] [phone] To Whom It May Concern: 
 
Sushma Lopez is currently under the care of DAVY Umanzor. She has been sick since 9/28/2017 and was seen 9/29/2017. She will return to work/school on: 10/1/2017 If there are questions or concerns please have the patient contact our office. Sincerely, Nathan Patel MD

## 2017-09-29 NOTE — PROGRESS NOTES
Patient Name: Sharri Machado   MRN: [id number]    Rossy Sommers is a 38 y.o. female who presents with the following:     Patient reports dry cough, myalgias, headache and URI symptoms for 2 days, unchanged since that time. Denies a history of fever, chest congestion, wheezing, SOB/JOHNSON and chest pain. Evaluation to date: none. Treatment to date: OTC products. Relevant PMH: No pertinent additional PMH. Patient reports sick contacts: no.   Checked BP at home when feeling unwell, was 140/100. Review of Systems   Constitutional: Negative for fever, malaise/fatigue and weight loss. Respiratory: Positive for cough. Negative for hemoptysis, shortness of breath and wheezing. Cardiovascular: Negative for chest pain, palpitations, leg swelling and PND. Gastrointestinal: Negative for abdominal pain, constipation, diarrhea, nausea and vomiting. Musculoskeletal: Positive for myalgias. Neurological: Positive for headaches. The patient's medications, allergies, past medical history, surgical history, family history and social history were reviewed and updated where appropriate. Prior to Admission medications    Medication Sig Start Date End Date Taking? Authorizing Provider   traZODone (DESYREL) 50 mg tablet Take 1 Tab by mouth nightly. For sleep 6/21/17   Jason Mercado NP       No Known Allergies        OBJECTIVE      Visit Vitals    /78 (BP 1 Location: Right arm, BP Patient Position: Sitting)    Pulse 90    Temp 98.2 °F (36.8 °C) (Oral)    Resp 18    Ht 4' 9\" (1.448 m)    Wt 130 lb (59 kg)    LMP 08/26/2017 (Exact Date)    SpO2 97%    BMI 28.13 kg/m2       Physical Exam   Constitutional: She is oriented to person, place, and time and well-developed, well-nourished, and in no distress. No distress. HENT:   Head: Normocephalic and atraumatic. Right Ear: Tympanic membrane is not perforated and not erythematous. No middle ear effusion. No decreased hearing is noted. Left Ear: Tympanic membrane is not perforated and not erythematous. No middle ear effusion. No decreased hearing is noted. Nose: Nose normal. Right sinus exhibits no maxillary sinus tenderness and no frontal sinus tenderness. Left sinus exhibits no maxillary sinus tenderness and no frontal sinus tenderness. Mouth/Throat: Uvula is midline, oropharynx is clear and moist and mucous membranes are normal.   Neck: Normal range of motion. Neck supple. Cardiovascular: Normal rate, regular rhythm and normal heart sounds. Exam reveals no gallop and no friction rub. No murmur heard. Pulmonary/Chest: Effort normal and breath sounds normal. No respiratory distress. She has no wheezes. Abdominal: Soft. Bowel sounds are normal. She exhibits no distension and no mass. There is no tenderness. There is no rebound and no guarding. Lymphadenopathy:     She has no cervical adenopathy. Neurological: She is alert and oriented to person, place, and time. Skin: She is not diaphoretic. Psychiatric: Mood, memory, affect and judgment normal.   Nursing note and vitals reviewed. ASSESSMENT AND PLAN  Yasmanyalicia Johnson is a 38 y.o. female who presents today for:    1. Upper respiratory tract infection, unspecified type  discussed diagnosis & treatment options, most likely viral at this time, reviewed the importance of avoiding unnecessary antibiotic therapy, reviewed which OTC medications to use and avoid, expected time course for resolution & red flags were reviewed with her to RTC or notify me. BP wnl today. Medications Discontinued During This Encounter   Medication Reason    traZODone (DESYREL) 50 mg tablet Not A Current Medication       Follow-up Disposition:  Return if symptoms worsen or fail to improve. Medication risks/benefits/costs/interactions/alternatives discussed with patient.   Advised patient to call back or return to office if symptoms worsen/change/persist. If patient cannot reach us or should anything more severe/urgent arise he/she should proceed directly to the nearest emergency department. Discussed expected course/resolution/complications of diagnosis in detail with patient. Patient given a written after visit summary which includes his/her diagnoses, current medications and vitals. Patient expressed understanding with the diagnosis and plan.      Chris Pedroza M.D.

## 2017-09-29 NOTE — MR AVS SNAPSHOT
Visit Information Date & Time Provider Department Dept. Phone Encounter #  
 9/29/2017  9:15 AM Arjun Michele MD [address] [place] [address] [phone] [id number] Follow-up Instructions Return if symptoms worsen or fail to improve. Upcoming Health Maintenance Date Due  
 PAP AKA CERVICAL CYTOLOGY 4/15/1998 INFLUENZA AGE 9 TO ADULT 8/1/2017 DTaP/Tdap/Td series (2 - Td) 2/19/2025 Allergies as of 9/29/2017  Review Complete On: 9/29/2017 By: Maggy Stovall No Known Allergies Current Immunizations  Reviewed on 6/29/2017 Name Date Hep B Vaccine 6/24/2014, 1/17/2014, 12/20/2013 Influenza Vaccine 2/19/2015 Influenza Vaccine (Quad) PF 9/19/2016 MMR 1/17/2014, 12/20/2013 Td 2/19/2015, 12/20/2013 Tdap 6/26/2014 Not reviewed this visit Vitals BP Pulse Temp Resp Height(growth percentile) Weight(growth percentile) 122/78 (BP 1 Location: Right arm, BP Patient Position: Sitting) 90 98.2 °F (36.8 °C) (Oral) 18 4' 9\" (1.448 m) 130 lb (59 kg) LMP SpO2 BMI OB Status Smoking Status 08/26/2017 (Exact Date) 97% 28.13 kg/m2 Having regular periods Never Smoker Vitals History BMI and BSA Data Body Mass Index Body Surface Area  
 28.13 kg/m 2 1.54 m 2 Preferred Pharmacy Pharmacy Name Phone [facility] [phone] [facility] [phone] Your Updated Medication List  
  
Notice  As of 9/29/2017  9:59 AM  
 You have not been prescribed any medications. Follow-up Instructions Return if symptoms worsen or fail to improve. Patient Instructions Take zinc lozenges (Cold-Eeze) as directed, May take Tylenol or Advil as needed for body aches, Use a humidifier at night, Stay well hydrated with water Upper Respiratory Infection (Cold): Care Instructions Your Care Instructions An upper respiratory infection, or URI, is an infection of the nose, sinuses, or throat. URIs are spread by coughs, sneezes, and direct contact. The common cold is the most frequent kind of URI. The flu and sinus infections are other kinds of URIs. Almost all URIs are caused by viruses. Antibiotics won't cure them. But you can treat most infections with home care. This may include drinking lots of fluids and taking over-the-counter pain medicine. You will probably feel better in 4 to 10 days. The doctor has checked you carefully, but problems can develop later. If you notice any problems or new symptoms, get medical treatment right away. Follow-up care is a key part of your treatment and safety. Be sure to make and go to all appointments, and call your doctor if you are having problems. It's also a good idea to know your test results and keep a list of the medicines you take. How can you care for yourself at home? · To prevent dehydration, drink plenty of fluids, enough so that your urine is light yellow or clear like water. Choose water and other caffeine-free clear liquids until you feel better. If you have kidney, heart, or liver disease and have to limit fluids, talk with your doctor before you increase the amount of fluids you drink. · Take an over-the-counter pain medicine, such as acetaminophen (Tylenol), ibuprofen (Advil, Motrin), or naproxen (Aleve). Read and follow all instructions on the label. · Before you use cough and cold medicines, check the label. These medicines may not be safe for young children or for people with certain health problems. · Be careful when taking over-the-counter cold or flu medicines and Tylenol at the same time. Many of these medicines have acetaminophen, which is Tylenol. Read the labels to make sure that you are not taking more than the recommended dose. Too much acetaminophen (Tylenol) can be harmful. · Get plenty of rest. 
· Do not smoke or allow others to smoke around you.  If you need help quitting, talk to your doctor about stop-smoking programs and medicines. These can increase your chances of quitting for good. When should you call for help? Call 911 anytime you think you may need emergency care. For example, call if: 
· You have severe trouble breathing. Call your doctor now or seek immediate medical care if: 
· You seem to be getting much sicker. · You have new or worse trouble breathing. · You have a new or higher fever. · You have a new rash. Watch closely for changes in your health, and be sure to contact your doctor if: 
· You have a new symptom, such as a sore throat, an earache, or sinus pain. · You cough more deeply or more often, especially if you notice more mucus or a change in the color of your mucus. · You do not get better as expected. Where can you learn more? Go to http://gómez-william.info/. Enter H518 in the search box to learn more about \"Upper Respiratory Infection (Cold): Care Instructions. \" Current as of: March 25, 2017 Content Version: 11.3 © 3189-5626 [employer]. Care instructions adapted under license by [employer] (which disclaims liability or warranty for this information). If you have questions about a medical condition or this instruction, always ask your healthcare professional. [place] [address] any warranty or liability for your use of this information. Introducing [facility]! [facility] introduces [email or web address] patient portal. Now you can access parts of your medical record, email your doctor's office, and request medication refills online. 1. In your internet browser, go to [email or web address] 2. Click on the First Time User? Click Here link in the Sign In box. You will see the New Member Sign Up page. 3. Enter your [email or web address] Access Code exactly as it appears below. You will not need to use this code after youve completed the sign-up process.  If you do not sign up before the expiration date, you must request a new code. · [employer] Access Code: [email or web address] Expires: 12/28/2017  9:59 AM 
 
4. Enter the last four digits of your Social Security Number (xxxx) and Date of Birth (mm/dd/yyyy) as indicated and click Submit. You will be taken to the next sign-up page. 5. Create a [employer] ID. This will be your [employer] login ID and cannot be changed, so think of one that is secure and easy to remember. 6. Create a [employer] password. You can change your password at any time. 7. Enter your Password Reset Question and Answer. This can be used at a later time if you forget your password. 8. Enter your e-mail address. You will receive e-mail notification when new information is available in [address] 9. Click Sign Up. You can now view and download portions of your medical record. 10. Click the Download Summary menu link to download a portable copy of your medical information. If you have questions, please visit the Frequently Asked Questions section of the [employer] website. Remember, [employer] is NOT to be used for urgent needs. For medical emergencies, dial 911. Now available from your iPhone and Android! Please provide this summary of care documentation to your next provider. Your primary care clinician is listed as Floyd Ventura. If you have any questions after today's visit, please call [phone].

## 2017-09-29 NOTE — PROGRESS NOTES
Chief Complaint   Patient presents with    Headache     x 2 days    Elevated Blood Pressure     140/100 on 09/27/17    Generalized Body Aches     x 2 days    Cough     x 2 days     1. Have you been to the ER, urgent care clinic since your last visit? Hospitalized since your last visit? Yes, [facility]17 for headache. 2. Have you seen or consulted any other health care providers outside of the [address] [place] [address] since your last visit? Include any pap smears or colon screening.  No

## 2017-09-30 ENCOUNTER — TELEPHONE (OUTPATIENT)
Dept: FAMILY MEDICINE CLINIC | Age: 42
End: 2017-09-30

## 2017-09-30 NOTE — TELEPHONE ENCOUNTER
Received phone call as on call provider. Patient name and  verified. Pt seen by me yesterday for URI symptoms. 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 Would like work note to state that she is unable to take exam tomorrow due to illness and will reschedule (otherwise will lose her deposit fee). Will send letter to be faxed over to listed fax number ([phone]) on Monday. Pt aware that if she gets worse, she should seek urgent care or be seen in clinic sooner.

## 2017-09-30 NOTE — LETTER
NOTIFICATION RETURN TO WORK / SCHOOL 
 
9/30/2017 1:14 PM 
 
Ms. Yu [address]aneda [address] [facility] [phone] To Whom It May Concern: 
 
Yu Castaneda is currently under the care of DAVY Umanzor. She has been ill since 9/28/2017 and continues to be sick as of today, 9/30/2017. 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 Please allow her to reschedule her exam without any financial penalty or loss of exam fees. Her exam info is as follows: 
 
Case number: [id number] Candidate number: [id number] This letter will be faxed to [phone]. If there are questions or concerns please have the patient contact our office. Sincerely, El Kaplan MD

## 2017-10-02 ENCOUNTER — TELEPHONE (OUTPATIENT)
Dept: FAMILY MEDICINE CLINIC | Age: 42
End: 2017-10-02

## 2017-10-02 NOTE — TELEPHONE ENCOUNTER
Call to patient.  verified. Informed patient note was faxed earlier this morning. They will  a copy today.  Copy up front for

## 2017-11-28 ENCOUNTER — OFFICE VISIT (OUTPATIENT)
Dept: FAMILY MEDICINE CLINIC | Age: 42
End: 2017-11-28

## 2017-11-28 VITALS
DIASTOLIC BLOOD PRESSURE: 86 MMHG | RESPIRATION RATE: 18 BRPM | BODY MASS INDEX: 29.21 KG/M2 | WEIGHT: 135.4 LBS | HEART RATE: 92 BPM | TEMPERATURE: 98 F | OXYGEN SATURATION: 96 % | HEIGHT: 57 IN | SYSTOLIC BLOOD PRESSURE: 133 MMHG

## 2017-11-28 DIAGNOSIS — Z22.7 LTBI (LATENT TUBERCULOSIS INFECTION): Primary | ICD-10-CM

## 2017-11-28 NOTE — LETTER
11/28/2017 Ms. Hali Weems [address] [phone] To Whom It May Concern: 
 
Hali Weems was under the care of [facility]. Hiwot Calvin has a history of latent tuberculosis found on incidental screening and treated in 2014 by the [facility]. She had a negative chest xray in October of 2016. She does not require repeat chest xray at this time as she is asymptomatic. It is not recommended that Hiwot Calvin undergo Tuberculin screening as she has a history of Latent TB. Furthermore, individuals in her situation with multiple risk factors (foreign born, working in high risk environment) have a positive Tuberculin threshold of >10mm. Her reading per the records she provided are 3.75mm, which is considered negative. If there are questions or concerns please have the patient contact our office. Sincerely, Masoud Mark NP

## 2017-11-28 NOTE — PROGRESS NOTES
Chief Complaint   Patient presents with    Skin Problem     TB skin test positive/ chest x-ray needed     1. Have you been to the ER, urgent care clinic since your last visit? Hospitalized since your last visit? No    2. Have you seen or consulted any other health care providers outside of the [address] [place] [address] since your last visit? Include any pap smears or colon screening.  No

## 2017-11-29 NOTE — PROGRESS NOTES
Assessment/Plan:     Diagnoses and all orders for this visit:    1. LTBI (latent tuberculosis infection)    Given a letter today stating no further work up is needed. Secondary to history of LTBI, Tuberculin skin testing is not recommended. Prior Chest xray was normal.  In individuals born in areas with high TB risk, positive skin test is greater than 10mm. She is asymptomatic at this time. Follow-up Disposition:  Return if symptoms worsen or fail to improve. Discussed expected course/resolution/complications of diagnosis in detail with patient.    Medication risks/benefits/costs/interactions/alternatives discussed with patient.    Pt was given after visit summary which includes diagnoses, current medications & vitals. Pt expressed understanding with the diagnosis and plan          Subjective:      Jani Wilson is a 38 y.o. female who presents for had concerns including Skin Problem. Was tested for TB via Tuberculin screening at work which she reports the LPN stated was \"positive\" and she requires chest xray today. She brings an incomplete test result with the measurement of 3.75mm. History of latent TB treated in 2014. Originally from [place].  Denies night cough, fever, night sweats. No recent contact with individuals with known active TB. No Known Allergies    ROS:   Complete review of systems was reviewed with pertinent information listed in HPI. Objective:     Visit Vitals    /86 (BP 1 Location: Left arm, BP Patient Position: Sitting)    Pulse 92    Temp 98 °F (36.7 °C) (Oral)    Resp 18    Ht 4' 9\" (1.448 m)    Wt 135 lb 6.4 oz (61.4 kg)    LMP 11/20/2017 (Exact Date)    SpO2 96%    BMI 29.3 kg/m2       Vitals and Nurse Documentation reviewed. Physical Exam   Constitutional: She is well-developed, well-nourished, and in no distress. Skin:   Tuberculin test is raised but outside of the timeline for reread.     Psychiatric: Mood, memory, affect and judgment normal.

## 2017-11-29 NOTE — PATIENT INSTRUCTIONS
Tuberculosis (Latent TB): Care Instructions  Your Care Instructions    Latent tuberculosis (TB) means that you have bacteria in your body that could cause active TB disease. You can't spread the bacteria to other people at this time. But if the bacteria overcome your body's defenses, the disease becomes active. With active TB in your lungs, you can spread the disease to others. Active TB is a serious disease. Latent TB doesn't have any symptoms. You may even be surprised that you have it, since you don't feel sick. It's very important to take your antibiotic medicine as your doctor tells you to. This treatment prevents you from getting active TB. It takes a long time to rid your body of TB. You may be taking medicine for 4 to 9 months. During your treatment you'll see your doctor for tests to see how the medicine is working. Your doctor will help guide you through this long process. You may have directly observed therapy (DOT). This means that a health care worker watches when you take your medicine. DOT helps you remember to take your medicine. And it helps you complete your treatment as soon as possible. Follow-up care is a key part of your treatment and safety. Be sure to make and go to all appointments, and call your doctor if you are having problems. It's also a good idea to know your test results and keep a list of the medicines you take. How can you care for yourself at home? · Take your antibiotics as directed. Do not stop taking them just because you feel better. You need to take the full course of antibiotics. · Take your medicine with food to help avoid an upset stomach. · If you forget to take your medicine, take the dose as soon as you can if it's the same day. Do not take two doses at the same time. If the day has passed, then take your next scheduled dose. Tell your doctor or public health worker that you missed a dose so he or she can adjust your treatment schedule.   · Talk to your doctor about whether it's safe to drink alcohol. Alcohol may interact with your medicine and cause side effects. · If you don't have DOT, there are things you can do to help remind you to take your medicine:  ¨ Take your medicine at the same time every day. ¨ Set a reminder alarm. ¨ Use a pillbox. ¨ Put a reminder note on your mirror or refrigerator. Sa[place]ja Loges a calendar after you take your medicine. When should you call for help? Call 911 anytime you think you may need emergency care. For example, call if:  ? · You have severe trouble breathing. ?Call your doctor now or seek immediate medical care if:  ? · You are short of breath. ? · You have a new or worse cough. ? · You have worse symptoms of infection, such as:  ¨ Increased pain, swelling, warmth, or redness. ¨ Red streaks leading from the area. ¨ Pus draining from the area. ¨ A fever. ? · You are dizzy or lightheaded, or you feel like you may faint. ? · You have new or worse diarrhea. ? Watch closely for changes in your health, and be sure to contact your doctor if:  ? · You lose weight. ? · You have night sweats. ? · You do not get better as expected. Where can you learn more? Go to http://gómez-william.info/. Enter T633 in the search box to learn more about \"Tuberculosis (Latent TB): Care Instructions. \"  Current as of: March 3, 2017  Content Version: 11.4  © 7668-4711 [employer]. Care instructions adapted under license by [employer] (which disclaims liability or warranty for this information). If you have questions about a medical condition or this instruction, always ask your healthcare professional. [place] [address] any warranty or liability for your use of this information.

## 2018-01-17 ENCOUNTER — OFFICE VISIT (OUTPATIENT)
Dept: FAMILY MEDICINE CLINIC | Age: 43
End: 2018-01-17

## 2018-01-17 VITALS
OXYGEN SATURATION: 98 % | SYSTOLIC BLOOD PRESSURE: 120 MMHG | HEART RATE: 95 BPM | DIASTOLIC BLOOD PRESSURE: 88 MMHG | RESPIRATION RATE: 16 BRPM | WEIGHT: 139 LBS | HEIGHT: 57 IN | TEMPERATURE: 98.1 F | BODY MASS INDEX: 29.99 KG/M2

## 2018-01-17 DIAGNOSIS — M75.21 BICEPS TENDINITIS OF RIGHT UPPER EXTREMITY: Primary | ICD-10-CM

## 2018-01-17 DIAGNOSIS — J06.9 VIRAL URI WITH COUGH: ICD-10-CM

## 2018-01-17 RX ORDER — IBUPROFEN 200 MG
600-800 TABLET ORAL
COMMUNITY
End: 2019-12-20

## 2018-01-17 RX ORDER — ACETAMINOPHEN 500 MG
1000 TABLET ORAL
COMMUNITY

## 2018-01-17 NOTE — PROGRESS NOTES
Chief Complaint   Patient presents with    Elbow Pain     Right inner elbow for 10 days     1. Have you been to the ER, urgent care clinic since your last visit? Hospitalized since your last visit? No    2. Have you seen or consulted any other health care providers outside of the [address] [place] [address] since your last visit? Include any pap smears or colon screening.  No

## 2018-01-17 NOTE — MR AVS SNAPSHOT
[address] [place] [address] 
[phone] Patient: Hector Fuentes MRN: [id number] GUB:[id number] Visit Information Date & Time Provider Department Dept. Phone Encounter #  
 1/17/2018  3:30 PM Roel Quintana  [facility] [phone] [id number] Upcoming Health Maintenance Date Due  
 PAP AKA CERVICAL CYTOLOGY 4/15/1998 DTaP/Tdap/Td series (2 - Td) 2/19/2025 Allergies as of 1/17/2018  Review Complete On: 11/28/2017 By: Zachary Lambert NP No Known Allergies Current Immunizations  Reviewed on 6/29/2017 Name Date Hep B Vaccine 6/24/2014, 1/17/2014, 12/20/2013 Influenza Vaccine 2/19/2015 Influenza Vaccine (Quad) PF 9/19/2016 MMR 1/17/2014, 12/20/2013 Td 2/19/2015, 12/20/2013 Tdap 6/26/2014 Not reviewed this visit Vitals BP Pulse Temp Resp Height(growth percentile) Weight(growth percentile) 120/88 95 98.1 °F (36.7 °C) (Oral) 16 4' 9\" (1.448 m) 139 lb (63 kg) SpO2 BMI OB Status Smoking Status 98% 30.08 kg/m2 Having regular periods Never Smoker Vitals History BMI and BSA Data Body Mass Index Body Surface Area 30.08 kg/m 2 1.59 m 2 Preferred Pharmacy Pharmacy Name Phone [address], [address] [place] [address] Rd. [phone] Your Updated Medication List  
  
Notice  As of 1/17/2018  4:19 PM  
 You have not been prescribed any medications. Introducing [facility] & HEALTH SERVICES! [facility] introduces GetJob patient portal. Now you can access parts of your medical record, email your doctor's office, and request medication refills online. 1. In your internet browser, go to [email or web address] 2. Click on the First Time User? Click Here link in the Sign In box. You will see the New Member Sign Up page. 3. Enter your GetJob Access Code exactly as it appears below.  You will not need to use this code after youve completed the sign-up process. If you do not sign up before the expiration date, you must request a new code. · [facility] Access Code: [email or web address] Expires: 4/17/2018  4:19 PM 
 
4. Enter the last four digits of your Social Security Number (xxxx) and Date of Birth (mm/dd/yyyy) as indicated and click Submit. You will be taken to the next sign-up page. 5. Create a [facility] [facility] [employer] ID. This will be your [employer] [facility] login ID and cannot be changed, so think of one that is secure and easy to remember. 6. Create a [employer] password. You can change your password at any time. 7. Enter your Password Reset Question and Answer. This can be used at a later time if you forget your password. 8. Enter your e-mail address. You will receive e-mail notification when new information is available in [address] 9. Click Sign Up. You can now view and download portions of your medical record. 10. Click the Download Summary menu link to download a portable copy of your medical information. If you have questions, please visit the Frequently Asked Questions section of the [employer] website. Remember, [facility] [facility] is NOT to be used for urgent needs. For medical emergencies, dial 911. Now available from your iPhone and Android! Please provide this summary of care documentation to your next provider. Your primary care clinician is listed as Floyd Ventura. If you have any questions after today's visit, please call [phone].

## 2018-03-08 ENCOUNTER — OFFICE VISIT (OUTPATIENT)
Dept: FAMILY MEDICINE CLINIC | Age: 43
End: 2018-03-08

## 2018-03-08 VITALS
DIASTOLIC BLOOD PRESSURE: 100 MMHG | TEMPERATURE: 98.2 F | SYSTOLIC BLOOD PRESSURE: 144 MMHG | HEIGHT: 57 IN | BODY MASS INDEX: 29.56 KG/M2 | RESPIRATION RATE: 18 BRPM | HEART RATE: 93 BPM | OXYGEN SATURATION: 96 % | WEIGHT: 137 LBS

## 2018-03-08 DIAGNOSIS — I10 HTN, GOAL BELOW 140/90: Primary | ICD-10-CM

## 2018-03-08 RX ORDER — CHLORTHALIDONE 25 MG/1
25 TABLET ORAL DAILY
Qty: 30 TAB | Refills: 2 | Status: SHIPPED | OUTPATIENT
Start: 2018-03-08 | End: 2018-04-13 | Stop reason: SDUPTHER

## 2018-03-08 NOTE — PROGRESS NOTES
Chief Complaint   Patient presents with    Elevated Blood Pressure     has been high    Headache     1. Have you been to the ER, urgent care clinic since your last visit? Hospitalized since your last visit? Yes, [facility] due to high BP 3/06/18. 2. Have you seen or consulted any other health care providers outside of the [address] [place] [address] since your last visit? Include any pap smears or colon screening. Yes, see above.

## 2018-03-08 NOTE — PROGRESS NOTES
Patient Name: Lorin Phillip   MRN: [id number]    Jerri Leslie is a 38 y.o. female who presents with the following:     Patient reports that her blood pressures have been high for the past month. Also has had some headaches off and on. Recently went to [facility] due to elevated blood pressure and was told that she should be on blood pressure medications. Has not been on medications in the past.  She does have a history of a cavernous angioma which is followed by neurology. Has had intermittent headaches in the past but are otherwise well controlled. MRI brain done in 9/2017 showed no interval change in size of mass. Review of Systems   Constitutional: Negative for fever, malaise/fatigue and weight loss. Respiratory: Negative for cough, hemoptysis, shortness of breath and wheezing. Cardiovascular: Negative for chest pain, palpitations, leg swelling and PND. Gastrointestinal: Negative for abdominal pain, constipation, diarrhea, nausea and vomiting. Neurological: Positive for headaches. The patient's medications, allergies, past medical history, surgical history, family history and social history were reviewed and updated where appropriate. Prior to Admission medications    Medication Sig Start Date End Date Taking? Authorizing Provider   ibuprofen (ADVIL) 200 mg tablet Take 3-4 Tabs by mouth every six (6) hours as needed for Pain. Yes Ino Valeor MD   acetaminophen (TYLENOL EXTRA STRENGTH) 500 mg tablet Take 2 Tabs by mouth every six (6) hours as needed for Pain.    Yes Ino Valero MD       No Known Allergies        OBJECTIVE    Visit Vitals    BP (!) 144/100 (BP 1 Location: Left arm, BP Patient Position: Sitting)    Pulse 93    Temp 98.2 °F (36.8 °C) (Oral)    Resp 18    Ht 4' 9\" (1.448 m)    Wt 137 lb (62.1 kg)    LMP 02/06/2018 (Exact Date)    SpO2 96%    BMI 29.65 kg/m2       Physical Exam   Constitutional: She is oriented to person, place, and time and well-developed, well-nourished, and in no distress. No distress. Eyes: Conjunctivae and EOM are normal. Pupils are equal, round, and reactive to light. Cardiovascular: Normal rate, regular rhythm and normal heart sounds. Exam reveals no gallop and no friction rub. No murmur heard. Pulmonary/Chest: Effort normal and breath sounds normal. No respiratory distress. She has no wheezes. Neurological: She is alert and oriented to person, place, and time. Skin: Skin is warm and dry. No rash noted. She is not diaphoretic. Psychiatric: Mood, memory, affect and judgment normal.   Nursing note and vitals reviewed. ASSESSMENT AND PLAN  Lorin Phillip is a 38 y.o. female who presents today for:    1. HTN, goal below 140/90  We will start medications as below. Patient to monitor her blood pressure about 2-3 times a week. - chlorthalidone (HYGROTEN) 25 mg tablet; Take 1 Tab by mouth daily. Dispense: 30 Tab; Refill: 2       There are no discontinued medications. Follow-up Disposition:  Return in about 4 weeks (around 4/5/2018) for HTN follow up. Medication risks/benefits/costs/interactions/alternatives discussed with patient. Advised patient to call back or return to office if symptoms worsen/change/persist. If patient cannot reach us or should anything more severe/urgent arise he/she should proceed directly to the nearest emergency department. Discussed expected course/resolution/complications of diagnosis in detail with patient. Patient given a written after visit summary which includes his/her diagnoses, current medications and vitals. Patient expressed understanding with the diagnosis and plan.      Hezekiah Claude M.D.

## 2018-03-08 NOTE — PATIENT INSTRUCTIONS

## 2018-03-08 NOTE — MR AVS SNAPSHOT
[address] 
[phone] Patient: Vipul Ibarra MRN: [id number] KJT:[id number] Visit Information Date & Time Provider Department Dept. Phone Encounter #  
 3/8/2018  9:00 AM Inez Gates  [facility] [phone] [id number] Follow-up Instructions Return in about 4 weeks (around 4/5/2018) for HTN follow up. Upcoming Health Maintenance Date Due  
 PAP AKA CERVICAL CYTOLOGY 4/15/1998 DTaP/Tdap/Td series (2 - Td) 2/19/2025 Allergies as of 3/8/2018  Review Complete On: 3/8/2018 By: Ina Velez No Known Allergies Current Immunizations  Reviewed on 6/29/2017 Name Date Hep B Vaccine 6/24/2014, 1/17/2014, 12/20/2013 Influenza Vaccine 2/19/2015 Influenza Vaccine (Quad) PF 9/19/2016 MMR 1/17/2014, 12/20/2013 Td 2/19/2015, 12/20/2013 Tdap 6/26/2014 Not reviewed this visit Vitals BP Pulse Temp Resp Height(growth percentile) Weight(growth percentile) (!) 144/100 (BP 1 Location: Left arm, BP Patient Position: Sitting) 93 98.2 °F (36.8 °C) (Oral) 18 4' 9\" (1.448 m) 137 lb (62.1 kg) LMP SpO2 BMI OB Status Smoking Status 02/06/2018 (Exact Date) 96% 29.65 kg/m2 Having regular periods Never Smoker Vitals History BMI and BSA Data Body Mass Index Body Surface Area  
 29.65 kg/m 2 1.58 m 2 Preferred Pharmacy Pharmacy Name Phone [address] [facility] [employer], [address] [place] [address] Rd. [phone] Your Updated Medication List  
  
   
This list is accurate as of 3/8/18 10:02 AM.  Always use your most recent med list. ADVIL 200 mg tablet Generic drug:  ibuprofen Take 3-4 Tabs by mouth every six (6) hours as needed for Pain. chlorthalidone 25 mg tablet Commonly known as:  Ethan Queen Take 1 Tab by mouth daily. TYLENOL EXTRA STRENGTH 500 mg tablet Generic drug:  acetaminophen Take 2 Tabs by mouth every six (6) hours as needed for Pain. Prescriptions Sent to Pharmacy Refills  
 chlorthalidone (HYGROTEN) 25 mg tablet 2 Sig: Take 1 Tab by mouth daily. Class: Normal  
 Pharmacy: [address] , [address] [facility] #: [phone] Route: Oral  
  
Follow-up Instructions Return in about 4 weeks (around 4/5/2018) for HTN follow up. Patient Instructions DASH Diet: Care Instructions Your Care Instructions The DASH diet is an eating plan that can help lower your blood pressure. DASH stands for Dietary Approaches to Stop Hypertension. Hypertension is high blood pressure. The DASH diet focuses on eating foods that are high in calcium, potassium, and magnesium. These nutrients can lower blood pressure. The foods that are highest in these nutrients are fruits, vegetables, low-fat dairy products, nuts, seeds, and legumes. But taking calcium, potassium, and magnesium supplements instead of eating foods that are high in those nutrients does not have the same effect. The DASH diet also includes whole grains, fish, and poultry. The DASH diet is one of several lifestyle changes your doctor may recommend to lower your high blood pressure. Your doctor may also want you to decrease the amount of sodium in your diet. Lowering sodium while following the DASH diet can lower blood pressure even further than just the DASH diet alone. Follow-up care is a key part of your treatment and safety. Be sure to make and go to all appointments, and call your doctor if you are having problems. It's also a good idea to know your test results and keep a list of the medicines you take. How can you care for yourself at home? Following the DASH diet · Eat 4 to 5 servings of fruit each day.  A serving is 1 medium-sized piece of fruit, ½ cup chopped or canned fruit, 1/4 cup dried fruit, or 4 ounces (½ cup) of fruit juice. Choose fruit more often than fruit juice. · Eat 4 to 5 servings of vegetables each day. A serving is 1 cup of lettuce or raw leafy vegetables, ½ cup of chopped or cooked vegetables, or 4 ounces (½ cup) of vegetable juice. Choose vegetables more often than vegetable juice. · Get 2 to 3 servings of low-fat and fat-free dairy each day. A serving is 8 ounces of milk, 1 cup of yogurt, or 1 ½ ounces of cheese. · Eat 6 to 8 servings of grains each day. A serving is 1 slice of bread, 1 ounce of dry cereal, or ½ cup of cooked rice, pasta, or cooked cereal. Try to choose whole-grain products as much as possible. · Limit lean meat, poultry, and fish to 2 servings each day. A serving is 3 ounces, about the size of a deck of cards. · Eat 4 to 5 servings of nuts, seeds, and legumes (cooked dried beans, lentils, and split peas) each week. A serving is 1/3 cup of nuts, 2 tablespoons of seeds, or ½ cup of cooked beans or peas. · Limit fats and oils to 2 to 3 servings each day. A serving is 1 teaspoon of vegetable oil or 2 tablespoons of salad dressing. · Limit sweets and added sugars to 5 servings or less a week. A serving is 1 tablespoon jelly or jam, ½ cup sorbet, or 1 cup of lemonade. · Eat less than 2,300 milligrams (mg) of sodium a day. If you limit your sodium to 1,500 mg a day, you can lower your blood pressure even more. Tips for success · Start small. Do not try to make dramatic changes to your diet all at once. You might feel that you are missing out on your favorite foods and then be more likely to not follow the plan. Make small changes, and stick with them. Once those changes become habit, add a few more changes. · Try some of the following: ¨ Make it a goal to eat a fruit or vegetable at every meal and at snacks. This will make it easy to get the recommended amount of fruits and vegetables each day. ¨ Try yogurt topped with fruit and nuts for a snack or healthy dessert. ¨ Add lettuce, tomato, cucumber, and onion to sandwiches. ¨ Combine a ready-made pizza crust with low-fat mozzarella cheese and lots of vegetable toppings. Try using tomatoes, squash, spinach, broccoli, carrots, cauliflower, and onions. ¨ Have a variety of cut-up vegetables with a low-fat dip as an appetizer instead of chips and dip. ¨ Sprinkle sunflower seeds or chopped almonds over salads. Or try adding chopped walnuts or almonds to cooked vegetables. ¨ Try some vegetarian meals using beans and peas. Add garbanzo or kidney beans to salads. Make burritos and tacos with mashed alvares beans or black beans. Where can you learn more? Go to http://gómez-william.info/. Enter F332 in the search box to learn more about \"DASH Diet: Care Instructions. \" Current as of: September 21, 2016 Content Version: 11.4 © 2597-4260 [employer]. Care instructions adapted under license by [employer] (which disclaims liability or warranty for this information). If you have questions about a medical condition or this instruction, always ask your healthcare professional. [place] [address] any warranty or liability for your use of this information. Introducing [facility] & HEALTH SERVICES! [employer] introduces StyleTrek patient portal. Now you can access parts of your medical record, email your doctor's office, and request medication refills online. 1. In your internet browser, go to [email or web address] 2. Click on the First Time User? Click Here link in the Sign In box. You will see the New Member Sign Up page. 3. Enter your [employer] Access Code exactly as it appears below. You will not need to use this code after youve completed the sign-up process. If you do not sign up before the expiration date, you must request a new code. · [employer] Access Code: [email or web address] Expires: 4/17/2018  4:19 PM 
 
 4. Enter the last four digits of your Social Security Number (xxxx) and Date of Birth (mm/dd/yyyy) as indicated and click Submit. You will be taken to the next sign-up page. 5. Create a nGame ID. This will be your nGame login ID and cannot be changed, so think of one that is secure and easy to remember. 6. Create a nGame password. You can change your password at any time. 7. Enter your Password Reset Question and Answer. This can be used at a later time if you forget your password. 8. Enter your e-mail address. You will receive e-mail notification when new information is available in [address] 9. Click Sign Up. You can now view and download portions of your medical record. 10. Click the Download Summary menu link to download a portable copy of your medical information. If you have questions, please visit the Frequently Asked Questions section of the nGame website. Remember, nGame is NOT to be used for urgent needs. For medical emergencies, dial 911. Now available from your iPhone and Android! Please provide this summary of care documentation to your next provider. Your primary care clinician is listed as Floyd Ventura. If you have any questions after today's visit, please call [phone].

## 2018-03-08 NOTE — LETTER
NOTIFICATION RETURN TO WORK / SCHOOL 
 
3/8/2018 10:08 AM 
 
[facility]. [facility]ector Fuentes [address] [facility] [phone] To Whom It May Concern: 
 
Hector Fuentes is currently under the care of DAVY Umanzor. Please excuse patient's  from time missed from work 03/08/18. If there are questions or concerns please have the patient contact our office. Sincerely, Carolyn Gilliam MD

## 2018-04-13 ENCOUNTER — OFFICE VISIT (OUTPATIENT)
Dept: FAMILY MEDICINE CLINIC | Age: 43
End: 2018-04-13

## 2018-04-13 VITALS
SYSTOLIC BLOOD PRESSURE: 126 MMHG | HEART RATE: 94 BPM | BODY MASS INDEX: 30.12 KG/M2 | HEIGHT: 57 IN | TEMPERATURE: 98.2 F | WEIGHT: 139.6 LBS | OXYGEN SATURATION: 97 % | DIASTOLIC BLOOD PRESSURE: 84 MMHG | RESPIRATION RATE: 18 BRPM

## 2018-04-13 DIAGNOSIS — E74.39 GLUCOSE INTOLERANCE: ICD-10-CM

## 2018-04-13 DIAGNOSIS — I10 HTN, GOAL BELOW 140/90: Primary | ICD-10-CM

## 2018-04-13 DIAGNOSIS — E78.5 HYPERLIPIDEMIA, UNSPECIFIED HYPERLIPIDEMIA TYPE: ICD-10-CM

## 2018-04-13 RX ORDER — CHLORTHALIDONE 25 MG/1
25 TABLET ORAL DAILY
Qty: 90 TAB | Refills: 1 | Status: SHIPPED | OUTPATIENT
Start: 2018-04-13 | End: 2018-10-01 | Stop reason: SDUPTHER

## 2018-04-13 NOTE — PATIENT INSTRUCTIONS

## 2018-04-13 NOTE — LETTER
4/17/2018 4:15 PM 
 
Ms. Chelsy Johnson [address] [phone] Dear Chelsy Johnson: 
 
Please find your most recent results below. Resulted Orders HEMOGLOBIN A1C WITH EAG Result Value Ref Range Hemoglobin A1c 6.0 (H) 4.8 - 5.6 % Comment:  
            Pre-diabetes: 5.7 - 6.4 Diabetes: >6.4 Glycemic control for adults with diabetes: <7.0 Estimated average glucose 126 mg/dL Narrative Performed at:  [address]  [id number] : Live Thao MD, Phone:  [phone] METABOLIC PANEL, COMPREHENSIVE Result Value Ref Range Glucose 100 (H) 65 - 99 mg/dL BUN 13 6 - 24 mg/dL Creatinine 0.53 (L) 0.57 - 1.00 mg/dL GFR est non- >59 mL/min/1.73 GFR est  >59 mL/min/1.73  
 BUN/Creatinine ratio 25 (H) 9 - 23 Sodium 141 134 - 144 mmol/L Potassium 3.7 3.5 - 5.2 mmol/L Chloride 102 96 - 106 mmol/L  
 CO2 21 18 - 29 mmol/L Calcium 9.5 8.7 - 10.2 mg/dL Protein, total 7.4 6.0 - 8.5 g/dL Albumin 4.4 3.5 - 5.5 g/dL GLOBULIN, TOTAL 3.0 1.5 - 4.5 g/dL A-G Ratio 1.5 1.2 - 2.2 Bilirubin, total 0.7 0.0 - 1.2 mg/dL Alk. phosphatase 75 39 - 117 IU/L  
 AST (SGOT) 13 0 - 40 IU/L  
 ALT (SGPT) 18 0 - 32 IU/L Narrative Performed at:  [address]  [id number] : Live Thao MD, Phone:  [phone] LIPID PANEL Result Value Ref Range Cholesterol, total 191 100 - 199 mg/dL Triglyceride 76 0 - 149 mg/dL HDL Cholesterol 50 >39 mg/dL VLDL, calculated 15 5 - 40 mg/dL LDL, calculated 126 (H) 0 - 99 mg/dL Narrative Performed at:  [address]  [id number] : Live Thao MD, Phone:  [phone] CVD REPORT Result Value Ref Range INTERPRETATION Note Comment:  
   Supplemental report is available. Narrative Performed at:  [address] [place] [address]  [id number] : Dayan Medina PhD, Phone:  [phone] RECOMMENDATIONS: 
 
Hemoglobin A1C (average blood sugar level for past 3 months) is in the pre diabetes range, which means your average sugar or glucose level is higher than normal. I would encourage healthy diets and regular exercise with the goal of maintaining a healthy weight before starting medications for this. Mildly high LDL; I would encourage a healthy diet and regular exercise with the goal of maintaining a healthy weight before starting medications for this. Please call me if you have any questions: [phone] Sincerely, Feliciano Khan MD

## 2018-04-13 NOTE — MR AVS SNAPSHOT
[address] 
[phone] Patient: Shakeel Bhatti MRN: [id number] WNS:[id number]79 Visit Information Date & Time Provider Department Dept. Phone Encounter #  
 4/13/2018  8:15 AM Jud Tamez  [facility] [phone] [id number] Follow-up Instructions Return in about 6 months (around 10/13/2018) for HTN follow up. Upcoming Health Maintenance Date Due  
 PAP AKA CERVICAL CYTOLOGY 4/15/1998 DTaP/Tdap/Td series (2 - Td) 2/19/2025 Allergies as of 4/13/2018  Review Complete On: 4/13/2018 By: Rosario Henderson No Known Allergies Current Immunizations  Reviewed on 6/29/2017 Name Date Hep B Vaccine 6/24/2014, 1/17/2014, 12/20/2013 Influenza Vaccine 2/19/2015 Influenza Vaccine (Quad) PF 9/19/2016 MMR 1/17/2014, 12/20/2013 Td 2/19/2015, 12/20/2013 Tdap 6/26/2014 Not reviewed this visit You Were Diagnosed With   
  
 Codes Comments HTN, goal below 140/90    -  Primary ICD-10-CM: I10 
ICD-9-CM: 401.9 Hyperlipidemia, unspecified hyperlipidemia type     ICD-10-CM: E78.5 ICD-9-CM: 272.4 Glucose intolerance     ICD-10-CM: E74.39 
ICD-9-CM: 271.3 Vitals BP Pulse Temp Resp Height(growth percentile) Weight(growth percentile) 126/84 (BP 1 Location: Right arm, BP Patient Position: Sitting) 94 98.2 °F (36.8 °C) (Oral) 18 4' 9\" (1.448 m) 139 lb 9.6 oz (63.3 kg) SpO2 BMI OB Status Smoking Status 97% 30.21 kg/m2 Having regular periods Never Smoker Vitals History BMI and BSA Data Body Mass Index Body Surface Area  
 30.21 kg/m 2 1.6 m 2 Preferred Pharmacy Pharmacy Name Phone [address], [address] [place] [address] Rd. [phone] Your Updated Medication List  
  
   
This list is accurate as of 4/13/18  8:42 AM.  Always use your most recent med list.  
  
  
  
  
 ADVIL 200 mg tablet Generic drug:  ibuprofen Take 3-4 Tabs by mouth every six (6) hours as needed for Pain. chlorthalidone 25 mg tablet Commonly known as:  Wyvonna Rocker Take 1 Tab by mouth daily. TYLENOL EXTRA STRENGTH 500 mg tablet Generic drug:  acetaminophen Take 2 Tabs by mouth every six (6) hours as needed for Pain. Prescriptions Sent to Pharmacy Refills  
 chlorthalidone (HYGROTEN) 25 mg tablet 1 Sig: Take 1 Tab by mouth daily. Class: Normal  
 [facility] [address] [facility] #: [phone] Route: Oral  
  
We Performed the Following HEMOGLOBIN A1C WITH EAG [43741 CPT(R)] LIPID PANEL [77108 CPT(R)] METABOLIC PANEL, COMPREHENSIVE [45591 CPT(R)] Follow-up Instructions Return in about 6 months (around 10/13/2018) for HTN follow up. Patient Instructions DASH Diet: Care Instructions Your Care Instructions The DASH diet is an eating plan that can help lower your blood pressure. DASH stands for Dietary Approaches to Stop Hypertension. Hypertension is high blood pressure. The DASH diet focuses on eating foods that are high in calcium, potassium, and magnesium. These nutrients can lower blood pressure. The foods that are highest in these nutrients are fruits, vegetables, low-fat dairy products, nuts, seeds, and legumes. But taking calcium, potassium, and magnesium supplements instead of eating foods that are high in those nutrients does not have the same effect. The DASH diet also includes whole grains, fish, and poultry. The DASH diet is one of several lifestyle changes your doctor may recommend to lower your high blood pressure. Your doctor may also want you to decrease the amount of sodium in your diet. Lowering sodium while following the DASH diet can lower blood pressure even further than just the DASH diet alone. Follow-up care is a key part of your treatment and safety. Be sure to make and go to all appointments, and call your doctor if you are having problems. It's also a good idea to know your test results and keep a list of the medicines you take. How can you care for yourself at home? Following the DASH diet · Eat 4 to 5 servings of fruit each day. A serving is 1 medium-sized piece of fruit, ½ cup chopped or canned fruit, 1/4 cup dried fruit, or 4 ounces (½ cup) of fruit juice. Choose fruit more often than fruit juice. · Eat 4 to 5 servings of vegetables each day. A serving is 1 cup of lettuce or raw leafy vegetables, ½ cup of chopped or cooked vegetables, or 4 ounces (½ cup) of vegetable juice. Choose vegetables more often than vegetable juice. · Get 2 to 3 servings of low-fat and fat-free dairy each day. A serving is 8 ounces of milk, 1 cup of yogurt, or 1 ½ ounces of cheese. · Eat 6 to 8 servings of grains each day. A serving is 1 slice of bread, 1 ounce of dry cereal, or ½ cup of cooked rice, pasta, or cooked cereal. Try to choose whole-grain products as much as possible. · Limit lean meat, poultry, and fish to 2 servings each day. A serving is 3 ounces, about the size of a deck of cards. · Eat 4 to 5 servings of nuts, seeds, and legumes (cooked dried beans, lentils, and split peas) each week. A serving is 1/3 cup of nuts, 2 tablespoons of seeds, or ½ cup of cooked beans or peas. · Limit fats and oils to 2 to 3 servings each day. A serving is 1 teaspoon of vegetable oil or 2 tablespoons of salad dressing. · Limit sweets and added sugars to 5 servings or less a week. A serving is 1 tablespoon jelly or jam, ½ cup sorbet, or 1 cup of lemonade. · Eat less than 2,300 milligrams (mg) of sodium a day. If you limit your sodium to 1,500 mg a day, you can lower your blood pressure even more. Tips for success · Start small.  Do not try to make dramatic changes to your diet all at once. You might feel that you are missing out on your favorite foods and then be more likely to not follow the plan. Make small changes, and stick with them. Once those changes become habit, add a few more changes. · Try some of the following: ¨ Make it a goal to eat a fruit or vegetable at every meal and at snacks. This will make it easy to get the recommended amount of fruits and vegetables each day. ¨ Try yogurt topped with fruit and nuts for a snack or healthy dessert. ¨ Add lettuce, tomato, cucumber, and onion to sandwiches. ¨ Combine a ready-made pizza crust with low-fat mozzarella cheese and lots of vegetable toppings. Try using tomatoes, squash, spinach, broccoli, carrots, cauliflower, and onions. ¨ Have a variety of cut-up vegetables with a low-fat dip as an appetizer instead of chips and dip. ¨ Sprinkle sunflower seeds or chopped almonds over salads. Or try adding chopped walnuts or almonds to cooked vegetables. ¨ Try some vegetarian meals using beans and peas. Add garbanzo or kidney beans to salads. Make burritos and tacos with mashed alvares beans or black beans. Where can you learn more? Go to http://gómez-william.info/. Enter Y544 in the search box to learn more about \"DASH Diet: Care Instructions. \" Current as of: September 21, 2016 Content Version: 11.4 © 6553-9949 [employer]. Care instructions adapted under license by "[employer]" (which disclaims liability or warranty for this information). If you have questions about a medical condition or this instruction, always ask your healthcare professional. [place] [address] any warranty or liability for your use of this information. Introducing [facility] & HEALTH SERVICES! [facility] introduces Sounday patient portal. Now you can access parts of your medical record, email your doctor's office, and request medication refills online.    
 
1. In your internet browser, go to [email or web address] 2. Click on the First Time User? Click Here link in the Sign In box. You will see the New Member Sign Up page. 3. Enter your Netlogon Access Code exactly as it appears below. You will not need to use this code after youve completed the sign-up process. If you do not sign up before the expiration date, you must request a new code. · Netlogon Access Code: [email or web address] Expires: 4/17/2018  5:19 PM 
 
4. Enter the last four digits of your Social Security Number (xxxx) and Date of Birth (mm/dd/yyyy) as indicated and click Submit. You will be taken to the next sign-up page. 5. Create a ONOFFMIX (?????)t ID. This will be your Netlogon login ID and cannot be changed, so think of one that is secure and easy to remember. 6. Create a Netlogon password. You can change your password at any time. 7. Enter your Password Reset Question and Answer. This can be used at a later time if you forget your password. 8. Enter your e-mail address. You will receive e-mail notification when new information is available in [address] 9. Click Sign Up. You can now view and download portions of your medical record. 10. Click the Download Summary menu link to download a portable copy of your medical information. If you have questions, please visit the Frequently Asked Questions section of the Netlogon website. Remember, Netlogon is NOT to be used for urgent needs. For medical emergencies, dial 911. Now available from your iPhone and Android! Please provide this summary of care documentation to your next provider. Your primary care clinician is listed as Floyd Ventura. If you have any questions after today's visit, please call [phone].

## 2018-04-13 NOTE — PROGRESS NOTES
Patient Name: Iqra Carlson   MRN: [id number]    Surya Green is a 38 y.o. female who presents with the following: The patient has hypertension, hyperlipidemia and pre-dm. She reports taking medications as instructed, no medication side effects noted, no TIA's, no chest pain on exertion, no swelling of ankles, no palpitations. Diet and Lifestyle: not attempting to follow a low fat, low cholesterol diet, sedentary, no formal exercise but active during the day. Lab review: orders written for new lab studies as appropriate; see orders. Since last OV, started chlorthalidone. BP Readings from Last 3 Encounters:   04/13/18 126/84   03/08/18 (!) 144/100   01/17/18 120/88       Review of Systems   Constitutional: Negative for fever, malaise/fatigue and weight loss. Respiratory: Negative for cough, hemoptysis, shortness of breath and wheezing. Cardiovascular: Negative for chest pain, palpitations, leg swelling and PND. Gastrointestinal: Negative for abdominal pain, constipation, diarrhea, nausea and vomiting. The patient's medications, allergies, past medical history, surgical history, family history and social history were reviewed and updated where appropriate. Prior to Admission medications    Medication Sig Start Date End Date Taking? Authorizing Provider   chlorthalidone (HYGROTEN) 25 mg tablet Take 1 Tab by mouth daily. 3/8/18  Yes Feliciano Khan MD   ibuprofen (ADVIL) 200 mg tablet Take 3-4 Tabs by mouth every six (6) hours as needed for Pain. Yes Trudy Mazariegos MD   acetaminophen (TYLENOL EXTRA STRENGTH) 500 mg tablet Take 2 Tabs by mouth every six (6) hours as needed for Pain.    Yes Trudy Mazariegos MD       No Known Allergies        OBJECTIVE    Visit Vitals    /84 (BP 1 Location: Right arm, BP Patient Position: Sitting)    Pulse 94    Temp 98.2 °F (36.8 °C) (Oral)    Resp 18    Ht 4' 9\" (1.448 m)    Wt 139 lb 9.6 oz (63.3 kg)    SpO2 97%    BMI 30.21 kg/m2 Physical Exam   Constitutional: She is oriented to person, place, and time and well-developed, well-nourished, and in no distress. No distress. Eyes: Conjunctivae and EOM are normal. Pupils are equal, round, and reactive to light. Cardiovascular: Normal rate, regular rhythm and normal heart sounds. Exam reveals no gallop and no friction rub. No murmur heard. Pulmonary/Chest: Effort normal and breath sounds normal. No respiratory distress. She has no wheezes. Neurological: She is alert and oriented to person, place, and time. Skin: Skin is warm and dry. No rash noted. She is not diaphoretic. Psychiatric: Mood, memory, affect and judgment normal.   Nursing note and vitals reviewed. ASSESSMENT AND PLAN  Payton Purdy is a 38 y.o. female who presents today for:    1. HTN, goal below 140/90  Stable, continue current treatment. - chlorthalidone (HYGROTEN) 25 mg tablet; Take 1 Tab by mouth daily. Dispense: 90 Tab; Refill: 1    2. Hyperlipidemia, unspecified hyperlipidemia type  Will calculate ASCVD risk score pending labs. - METABOLIC PANEL, COMPREHENSIVE  - LIPID PANEL    3. Glucose intolerance  - HEMOGLOBIN A1C WITH EAG       Medications Discontinued During This Encounter   Medication Reason    chlorthalidone (HYGROTEN) 25 mg tablet Reorder       Follow-up Disposition:  Return in about 6 months (around 10/13/2018) for HTN follow up. Medication risks/benefits/costs/interactions/alternatives discussed with patient. Advised patient to call back or return to office if symptoms worsen/change/persist. If patient cannot reach us or should anything more severe/urgent arise he/she should proceed directly to the nearest emergency department. Discussed expected course/resolution/complications of diagnosis in detail with patient. Patient given a written after visit summary which includes his/her diagnoses, current medications and vitals.   Patient expressed understanding with the diagnosis and Keesha Alan M.D.

## 2018-04-13 NOTE — PROGRESS NOTES
Chief Complaint   Patient presents with    Hypertension     follow up     1. Have you been to the ER, urgent care clinic since your last visit? Hospitalized since your last visit? No    2. Have you seen or consulted any other health care providers outside of the [address] [place] [address] since your last visit? Include any pap smears or colon screening.  No

## 2018-04-17 LAB
ALBUMIN SERPL-MCNC: 4.4 G/DL (ref 3.5–5.5)
ALBUMIN/GLOB SERPL: 1.5 {RATIO} (ref 1.2–2.2)
ALP SERPL-CCNC: 75 IU/L (ref 39–117)
ALT SERPL-CCNC: 18 IU/L (ref 0–32)
AST SERPL-CCNC: 13 IU/L (ref 0–40)
BILIRUB SERPL-MCNC: 0.7 MG/DL (ref 0–1.2)
BUN SERPL-MCNC: 13 MG/DL (ref 6–24)
BUN/CREAT SERPL: 25 (ref 9–23)
CALCIUM SERPL-MCNC: 9.5 MG/DL (ref 8.7–10.2)
CHLORIDE SERPL-SCNC: 102 MMOL/L (ref 96–106)
CHOLEST SERPL-MCNC: 191 MG/DL (ref 100–199)
CO2 SERPL-SCNC: 21 MMOL/L (ref 18–29)
CREAT SERPL-MCNC: 0.53 MG/DL (ref 0.57–1)
EST. AVERAGE GLUCOSE BLD GHB EST-MCNC: 126 MG/DL
GFR SERPLBLD CREATININE-BSD FMLA CKD-EPI: 118 ML/MIN/1.73
GFR SERPLBLD CREATININE-BSD FMLA CKD-EPI: 136 ML/MIN/1.73
GLOBULIN SER CALC-MCNC: 3 G/DL (ref 1.5–4.5)
GLUCOSE SERPL-MCNC: 100 MG/DL (ref 65–99)
HBA1C MFR BLD: 6 % (ref 4.8–5.6)
HDLC SERPL-MCNC: 50 MG/DL
INTERPRETATION, 910389: NORMAL
LDLC SERPL CALC-MCNC: 126 MG/DL (ref 0–99)
POTASSIUM SERPL-SCNC: 3.7 MMOL/L (ref 3.5–5.2)
PROT SERPL-MCNC: 7.4 G/DL (ref 6–8.5)
SODIUM SERPL-SCNC: 141 MMOL/L (ref 134–144)
TRIGL SERPL-MCNC: 76 MG/DL (ref 0–149)
VLDLC SERPL CALC-MCNC: 15 MG/DL (ref 5–40)

## 2018-04-17 NOTE — PROGRESS NOTES
Please notify patient regarding their test results:    Hemoglobin A1C (average blood sugar level for past 3 months) is in the pre diabetes range, which means your average sugar or glucose level is higher than normal. I would encourage healthy diets and regular exercise with the goal of maintaining a healthy weight before starting medications for this. Mildly high LDL; I would encourage a healthy diet and regular exercise with the goal of maintaining a healthy weight before starting medications for this.

## 2018-04-17 NOTE — PROGRESS NOTES
Called patient,  verified. Informed patient of lab results and any recommendations. Patient verbalized understanding. A copy of lab results will be mailed to patient.

## 2018-05-18 NOTE — PROGRESS NOTES
HISTORY OF PRESENT ILLNESS  DANIEL Berg is a 38 y.o. female with history of migraines who presents to office today with her  for elbow pain. Pt complains of inner right elbow pain x2 weeks. 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 She denies a history of similar symptoms. Pt complains of dry cough and itchy throat x1 week. She denies fever. 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 No current facility-administered medications on file prior to visit. No Known Allergies  Family History   Problem Relation Age of Onset    Diabetes Mother     No Known Problems Father      Social History     Social History    Marital status:      Spouse name: N/A    Number of children: N/A    Years of education: N/A     Social History Main Topics    Smoking status: Never Smoker    Smokeless tobacco: Never Used    Alcohol use No    Drug use: No    Sexual activity: Yes     Partners: Male     Other Topics Concern    None     Social History Narrative             Review of Systems   Constitutional: Negative for chills, diaphoresis, fever, malaise/fatigue and weight loss. Eyes: Negative for blurred vision, double vision, pain and redness. Respiratory: Positive for cough. Negative for sputum production, shortness of breath and wheezing. Cardiovascular: Negative for chest pain, palpitations, orthopnea, claudication, leg swelling and PND. Musculoskeletal:        Right elbow pain   Skin: Negative for itching and rash.    Neurological: Negative for dizziness, tingling, tremors, sensory change, speech change, focal weakness, seizures, loss of consciousness, weakness and headaches. Results for orders placed or performed in visit on 05/17/17   Gia Delgado SELECT 13 (MW)   Result Value Ref Range    REPORT SUMMARY FINAL              Physical Exam  Visit Vitals    /88    Pulse 95    Temp 98.1 °F (36.7 °C) (Oral)    Resp 16    Ht 4' 9\" (1.448 m)    Wt 139 lb (63 kg)    SpO2 98%    BMI 30.08 kg/m2     Nose: Nares normal. Septum midline. Mucosa normal. No drainage or sinus tenderness. Throat: Lips, mucosa, and tongue normal. Teeth and gums normal  Lungs: clear to auscultation bilaterally  Right elbow: no discomfort over the medial or lateral epicondyle, no tenderness over the olecranon, there is tenderness over the distal biceps tendon that's aggravated by extending the hand especially when she tries to resist with her palm against pressure               ASSESSMENT and PLAN    ICD-10-CM ICD-9-CM    1. Biceps tendinitis of right upper extremityAcute M75.21 726.12 ibuprofen (ADVIL) 200 mg tablet      acetaminophen (TYLENOL EXTRA STRENGTH) 500 mg tablet    at elbow     2. Viral URI with cough J06.9 465.9     B97.89       Diagnoses and all orders for this visit:    1. Biceps tendinitis of right upper extremity  Comments:  at elbow      2. Viral URI with cough      Follow-up Disposition:  Return if symptoms worsen or fail to improve. reviewed medications and side effects in detail  Please call my office if there are any questions- [phone]. Discussed expected course/resolution/complications of diagnosis in detail with patient. Medication risks/benefits/costs/interactions/alternatives discussed with patient. Pt was given an after visit summary which includes diagnoses, current medications & vitals. Pt expressed understanding with the diagnosis and plan. Patient to call if no better in 3 -4 days and prn new problems.   Total 25 minutes,60 % counseling re: Her cough is probably a virus; it could be allergy. We'll treat it symptomatically with Robitussin DM and Claritin 10mg, available OTC; wrote this down along with generic names to keep their cost down , etc.    For her elbow, she needs to cut back on her activity with the right arm. I explained in detail what kinds of activities she needs to be very careful of. If she continues with heavy lifting and frequent bending of the elbow, she's not going to be able to get rid of this discomfort. I recommended that she use ice at the end of the day and 2-3 Advil every 6 hours prn, and I reviewed with her how to stretch the muscle regularly as well at least TID. Rhiannon Colunga Also, discussed symptoms of concern that were noted today in the note above, treatment options( including doing nothing), when to follow up before recommended time frame. Also, answered all questions. This document was written by Robin Vyas, as dictated by Katherine Rain MD.  I have reviewed and agree with the above note and have made corrections where appropriate Jake Galeano M.D. No

## 2018-10-01 ENCOUNTER — OFFICE VISIT (OUTPATIENT)
Dept: FAMILY MEDICINE CLINIC | Age: 43
End: 2018-10-01

## 2018-10-01 VITALS
OXYGEN SATURATION: 97 % | HEART RATE: 84 BPM | HEIGHT: 57 IN | TEMPERATURE: 98.4 F | WEIGHT: 134 LBS | SYSTOLIC BLOOD PRESSURE: 118 MMHG | BODY MASS INDEX: 28.91 KG/M2 | RESPIRATION RATE: 18 BRPM | DIASTOLIC BLOOD PRESSURE: 72 MMHG

## 2018-10-01 DIAGNOSIS — I10 HTN, GOAL BELOW 140/90: ICD-10-CM

## 2018-10-01 DIAGNOSIS — Z23 ENCOUNTER FOR IMMUNIZATION: ICD-10-CM

## 2018-10-01 DIAGNOSIS — R42 DIZZINESS: Primary | ICD-10-CM

## 2018-10-01 RX ORDER — CHLORTHALIDONE 25 MG/1
25 TABLET ORAL DAILY
Qty: 90 TAB | Refills: 1 | Status: SHIPPED | OUTPATIENT
Start: 2018-10-01 | End: 2019-04-01 | Stop reason: SDUPTHER

## 2018-10-01 NOTE — LETTER
NOTIFICATION RETURN TO WORK / SCHOOL 
 
10/1/2018 9:56 AM 
 
Ms. Brenda Betts [address] [place] [phone] To Whom It May Concern: 
 
Brenda Betts is currently under the care of DAVY Umanzor. She was seen 10/1/2018. If there are questions or concerns please have the patient contact our office. Sincerely, Bell Guzman MD

## 2018-10-01 NOTE — PROGRESS NOTES
Chief Complaint Patient presents with  Dizziness  
  x 3 days  Generalized Body Aches  
  x 3 dasy  Headache  
  x 3 days 1. Have you been to the ER, urgent care clinic since your last visit? Hospitalized since your last visit? No 
 
2. Have you seen or consulted any other health care providers outside of the [address] [place] [address] since your last visit? Include any pap smears or colon screening.  No

## 2018-10-01 NOTE — MR AVS SNAPSHOT
[facility] 
 
 
 [address] [facility] [address] 
[phone] Patient: Andrew Webb MRN: [id number] QPL:[id number] Visit Information Date & Time Provider Department Dept. Phone Encounter #  
 10/1/2018  9:15 AM Nataliya Swan  [facility] [phone] [id number] Follow-up Instructions Return in about 6 months (around 4/1/2019), or if symptoms worsen or fail to improve, for CPE (30 min). Upcoming Health Maintenance Date Due  
 PAP AKA CERVICAL CYTOLOGY 4/15/1998 Influenza Age 5 to Adult 8/1/2018 DTaP/Tdap/Td series (2 - Td) 2/19/2025 Allergies as of 10/1/2018  Review Complete On: 10/1/2018 By: Sofya Castañeda No Known Allergies Current Immunizations  Reviewed on 6/29/2017 Name Date Hep B Vaccine 6/24/2014, 1/17/2014, 12/20/2013 Influenza Vaccine 2/19/2015 Influenza Vaccine (Quad) PF 9/19/2016 MMR 1/17/2014, 12/20/2013 Td 2/19/2015, 12/20/2013 Tdap 6/26/2014 Not reviewed this visit You Were Diagnosed With   
  
 Codes Comments HTN, goal below 140/90     ICD-10-CM: I10 
ICD-9-CM: 401.9 Vitals BP Pulse Temp Resp Height(growth percentile) Weight(growth percentile) 118/72 (BP 1 Location: Right arm, BP Patient Position: Sitting) 84 98.4 °F (36.9 °C) (Oral) 18 4' 9\" (1.448 m) 134 lb (60.8 kg) LMP SpO2 BMI OB Status Smoking Status 09/04/2018 (Exact Date) 97% 29 kg/m2 Having regular periods Never Smoker Vitals History BMI and BSA Data Body Mass Index Body Surface Area  
 29 kg/m 2 1.56 m 2 Preferred Pharmacy Pharmacy Name Phone [address] [place] [address] Rd. [phone] Your Updated Medication List  
  
   
This list is accurate as of 10/1/18  9:55 AM.  Always use your most recent med list. ADVIL 200 mg tablet Generic drug:  ibuprofen Take 3-4 Tabs by mouth every six (6) hours as needed for Pain. chlorthalidone 25 mg tablet Commonly known as:  Aubree Cambtrae Take 1 Tab by mouth daily. TYLENOL EXTRA STRENGTH 500 mg tablet Generic drug:  acetaminophen Take 2 Tabs by mouth every six (6) hours as needed for Pain. Prescriptions Sent to Pharmacy Refills  
 chlorthalidone (HYGROTEN) 25 mg tablet 1 Sig: Take 1 Tab by mouth daily. Class: Normal  
 Pharmacy: [address] [place] [address], [address] [facility] #: [phone] Route: Oral  
  
Follow-up Instructions Return in about 6 months (around 4/1/2019), or if symptoms worsen or fail to improve, for CPE (30 min). Patient Instructions DASH Diet: Care Instructions Your Care Instructions The DASH diet is an eating plan that can help lower your blood pressure. DASH stands for Dietary Approaches to Stop Hypertension. Hypertension is high blood pressure. The DASH diet focuses on eating foods that are high in calcium, potassium, and magnesium. These nutrients can lower blood pressure. The foods that are highest in these nutrients are fruits, vegetables, low-fat dairy products, nuts, seeds, and legumes. But taking calcium, potassium, and magnesium supplements instead of eating foods that are high in those nutrients does not have the same effect. The DASH diet also includes whole grains, fish, and poultry. The DASH diet is one of several lifestyle changes your doctor may recommend to lower your high blood pressure. Your doctor may also want you to decrease the amount of sodium in your diet. Lowering sodium while following the DASH diet can lower blood pressure even further than just the DASH diet alone. Follow-up care is a key part of your treatment and safety. Be sure to make and go to all appointments, and call your doctor if you are having problems.  It's also a good idea to know your test results and keep a list of the medicines you take. How can you care for yourself at home? Following the DASH diet · Eat 4 to 5 servings of fruit each day. A serving is 1 medium-sized piece of fruit, ½ cup chopped or canned fruit, 1/4 cup dried fruit, or 4 ounces (½ cup) of fruit juice. Choose fruit more often than fruit juice. · Eat 4 to 5 servings of vegetables each day. A serving is 1 cup of lettuce or raw leafy vegetables, ½ cup of chopped or cooked vegetables, or 4 ounces (½ cup) of vegetable juice. Choose vegetables more often than vegetable juice. · Get 2 to 3 servings of low-fat and fat-free dairy each day. A serving is 8 ounces of milk, 1 cup of yogurt, or 1 ½ ounces of cheese. · Eat 6 to 8 servings of grains each day. A serving is 1 slice of bread, 1 ounce of dry cereal, or ½ cup of cooked rice, pasta, or cooked cereal. Try to choose whole-grain products as much as possible. · Limit lean meat, poultry, and fish to 2 servings each day. A serving is 3 ounces, about the size of a deck of cards. · Eat 4 to 5 servings of nuts, seeds, and legumes (cooked dried beans, lentils, and split peas) each week. A serving is 1/3 cup of nuts, 2 tablespoons of seeds, or ½ cup of cooked beans or peas. · Limit fats and oils to 2 to 3 servings each day. A serving is 1 teaspoon of vegetable oil or 2 tablespoons of salad dressing. · Limit sweets and added sugars to 5 servings or less a week. A serving is 1 tablespoon jelly or jam, ½ cup sorbet, or 1 cup of lemonade. · Eat less than 2,300 milligrams (mg) of sodium a day. If you limit your sodium to 1,500 mg a day, you can lower your blood pressure even more. Tips for success · Start small. Do not try to make dramatic changes to your diet all at once. You might feel that you are missing out on your favorite foods and then be more likely to not follow the plan. Make small changes, and stick with them. Once those changes become habit, add a few more changes. · Try some of the following: ¨ Make it a goal to eat a fruit or vegetable at every meal and at snacks. This will make it easy to get the recommended amount of fruits and vegetables each day. ¨ Try yogurt topped with fruit and nuts for a snack or healthy dessert. ¨ Add lettuce, tomato, cucumber, and onion to sandwiches. ¨ Combine a ready-made pizza crust with low-fat mozzarella cheese and lots of vegetable toppings. Try using tomatoes, squash, spinach, broccoli, carrots, cauliflower, and onions. ¨ Have a variety of cut-up vegetables with a low-fat dip as an appetizer instead of chips and dip. ¨ Sprinkle sunflower seeds or chopped almonds over salads. Or try adding chopped walnuts or almonds to cooked vegetables. ¨ Try some vegetarian meals using beans and peas. Add garbanzo or kidney beans to salads. Make burritos and tacos with mashed alvares beans or black beans. Where can you learn more? Go to http://gómez-william.info/. Enter I206 in the search box to learn more about \"DASH Diet: Care Instructions. \" Current as of: December 6, 2017 Content Version: 11.7 © 1622-7837 [employer]. Care instructions adapted under license by [employer] (which disclaims liability or warranty for this information). If you have questions about a medical condition or this instruction, always ask your healthcare professional. [place] [address] any warranty or liability for your use of this information. Introducing \[facility]\"" & HEALTH SERVICES! [employer] introduces Tabletize.com patient portal. Now you can access parts of your medical record, email your doctor's office, and request medication refills online. 1. In your internet browser, go to [email or web address] 2. Click on the First Time User? Click Here link in the Sign In box. You will see the New Member Sign Up page. 3. Enter your Tabletize.com Access Code exactly as it appears below.  You will not need to use this code after youve completed the sign-up process. If you do not sign up before the expiration date, you must request a new code. · Takipi Access Code: [facility] -  Expires: 12/30/2018  9:55 AM 
 
4. Enter the last four digits of your Social Security Number (xxxx) and Date of Birth (mm/dd/yyyy) as indicated and click Submit. You will be taken to the next sign-up page. 5. Create a Takipi ID. This will be your Takipi login ID and cannot be changed, so think of one that is secure and easy to remember. 6. Create a Takipi password. You can change your password at any time. 7. Enter your Password Reset Question and Answer. This can be used at a later time if you forget your password. 8. Enter your e-mail address. You will receive e-mail notification when new information is available in [address]. 9. Click Sign Up. You can now view and download portions of your medical record. 10. Click the Download Summary menu link to download a portable copy of your medical information. If you have questions, please visit the Frequently Asked Questions section of the Takipi website. Remember, Takipi is NOT to be used for urgent needs. For medical emergencies, dial 911. Now available from your iPhone and Android! Please provide this summary of care documentation to your next provider. Your primary care clinician is listed as Floyd Ventura. If you have any questions after today's visit, please call [phone].

## 2018-10-01 NOTE — PATIENT INSTRUCTIONS

## 2019-01-10 ENCOUNTER — OFFICE VISIT (OUTPATIENT)
Dept: FAMILY MEDICINE CLINIC | Age: 44
End: 2019-01-10

## 2019-01-10 VITALS
TEMPERATURE: 98.8 F | HEART RATE: 90 BPM | BODY MASS INDEX: 28.87 KG/M2 | WEIGHT: 133.8 LBS | OXYGEN SATURATION: 97 % | DIASTOLIC BLOOD PRESSURE: 83 MMHG | HEIGHT: 57 IN | SYSTOLIC BLOOD PRESSURE: 119 MMHG | RESPIRATION RATE: 16 BRPM

## 2019-01-10 DIAGNOSIS — Z13.29 SCREENING FOR ENDOCRINE, METABOLIC, AND IMMUNITY DISORDER: ICD-10-CM

## 2019-01-10 DIAGNOSIS — Z13.228 SCREENING FOR ENDOCRINE, METABOLIC, AND IMMUNITY DISORDER: ICD-10-CM

## 2019-01-10 DIAGNOSIS — Z13.0 SCREENING FOR ENDOCRINE, METABOLIC, AND IMMUNITY DISORDER: ICD-10-CM

## 2019-01-10 DIAGNOSIS — J06.9 VIRAL UPPER RESPIRATORY TRACT INFECTION: Primary | ICD-10-CM

## 2019-01-10 NOTE — PROGRESS NOTES
[address] [facility] Note    Alla Ortiz is a 41 y.o. female who was seen in clinic today (1/10/2019). Subjective:  Upper Respiratory Infection  Patient complains of symptoms of a URI. Symptoms include coryza, sore throat and cough. Onset of symptoms was 3 days ago, unchanged since that time. She also c/o congestion, coryza, non productive cough, post nasal drip and sore throat for the past 3 days . She is drinking plenty of fluids. . Evaluation to date: none. Treatment to date: antihistamines, Cepacol. Patient reports getting sick every 3-4 months. She is concerned about her immunity. She works in a nursing home. Prior to Admission medications    Medication Sig Start Date End Date Taking? Authorizing Provider   chlorthalidone (HYGROTEN) 25 mg tablet Take 1 Tab by mouth daily. 10/1/18  Yes Chay Pretty MD   ibuprofen (ADVIL) 200 mg tablet Take 3-4 Tabs by mouth every six (6) hours as needed for Pain. Yes Matteo Hester MD   acetaminophen (TYLENOL EXTRA STRENGTH) 500 mg tablet Take 2 Tabs by mouth every six (6) hours as needed for Pain. Yes Matteo Hester MD          No Known Allergies        ROS  See HPI    Objective:   Physical Exam   Constitutional: She is oriented to person, place, and time. She appears well-developed and well-nourished. No distress. HENT:   Right Ear: Tympanic membrane and ear canal normal.   Left Ear: Tympanic membrane and ear canal normal.   Nose: Mucosal edema present. Right sinus exhibits no maxillary sinus tenderness and no frontal sinus tenderness. Left sinus exhibits no maxillary sinus tenderness and no frontal sinus tenderness. Mouth/Throat: Oropharynx is clear and moist.   Cardiovascular: Normal rate, regular rhythm and normal heart sounds. No murmur heard. Pulmonary/Chest: Effort normal and breath sounds normal. She has no decreased breath sounds. She has no wheezes. She has no rhonchi.    Lymphadenopathy:     She has no cervical adenopathy. Neurological: She is alert and oriented to person, place, and time. Psychiatric: She has a normal mood and affect. Her behavior is normal.   Nursing note and vitals reviewed. Visit Vitals  /83 (BP 1 Location: Left arm, BP Patient Position: Sitting)   Pulse 90   Temp 98.8 °F (37.1 °C) (Oral)   Resp 16   Ht 4' 9\" (1.448 m)   Wt 133 lb 12.8 oz (60.7 kg)   LMP 01/02/2019 (Exact Date)   SpO2 97%   BMI 28.95 kg/m²       Assessment & Plan:  Diagnoses and all orders for this visit:    1. Viral upper respiratory tract infection  Discussed that symptoms were viral and recommended treating symptoms. Increase fluids and rest. Reviewed OTC products that patient could take. 2. Screening for endocrine, metabolic, and immunity disorder  Check CBC. Patient can take one a day MVI. -     CBC WITH AUTOMATED DIFF      I have discussed the diagnosis with the patient and the intended plan as seen in the above orders. The patient has received an after-visit summary along with patient information handout. I have discussed medication side effects and warnings with the patient as well.     Follow-up Disposition: Not on [address], NP

## 2019-01-10 NOTE — PATIENT INSTRUCTIONS

## 2019-01-10 NOTE — PROGRESS NOTES
Chief Complaint   Patient presents with    Sore Throat     Started Monday evening, cough -dry, sore throat,h/a, and body aches. No fever . Took some kind of otc med, not much help       Reviewed Record in preparation for visit and have obtained necessary documentation. Identified pt with two pt identifiers (Name @ )    Health Maintenance Due   Topic    PAP AKA CERVICAL CYTOLOGY          1. Have you been to the ER, urgent care clinic since your last visit? Hospitalized since your last visit? 2. Have you seen or consulted any other health care providers outside of the [address] [place] [address] since your last visit? Include any pap smears or colon screening.

## 2019-01-10 NOTE — LETTER
NOTIFICATION RETURN TO WORK / SCHOOL 
 
1/10/2019 2:07 PM 
 
Ms. Mariusz Mccormack [address] [place] [id number] To Whom It May Concern: 
 
Mariusz Mccormack is currently under the care of DAVY Umanzor. She will return to work/school on: 1/11/19 If there are questions or concerns please have the patient contact our office. Sincerely, Stephania Lin NP

## 2019-02-10 LAB
BASOPHILS # BLD AUTO: 0 X10E3/UL (ref 0–0.2)
BASOPHILS NFR BLD AUTO: 0 %
EOSINOPHIL # BLD AUTO: 0.2 X10E3/UL (ref 0–0.4)
EOSINOPHIL NFR BLD AUTO: 2 %
ERYTHROCYTE [DISTWIDTH] IN BLOOD BY AUTOMATED COUNT: 13.4 % (ref 12.3–15.4)
HCT VFR BLD AUTO: 40.8 % (ref 34–46.6)
HGB BLD-MCNC: 14.2 G/DL (ref 11.1–15.9)
IMM GRANULOCYTES # BLD AUTO: 0 X10E3/UL (ref 0–0.1)
IMM GRANULOCYTES NFR BLD AUTO: 0 %
LYMPHOCYTES # BLD AUTO: 2.5 X10E3/UL (ref 0.7–3.1)
LYMPHOCYTES NFR BLD AUTO: 32 %
MCH RBC QN AUTO: 30.7 PG (ref 26.6–33)
MCHC RBC AUTO-ENTMCNC: 34.8 G/DL (ref 31.5–35.7)
MCV RBC AUTO: 88 FL (ref 79–97)
MONOCYTES # BLD AUTO: 0.4 X10E3/UL (ref 0.1–0.9)
MONOCYTES NFR BLD AUTO: 5 %
NEUTROPHILS # BLD AUTO: 4.7 X10E3/UL (ref 1.4–7)
NEUTROPHILS NFR BLD AUTO: 61 %
PLATELET # BLD AUTO: 234 X10E3/UL (ref 150–379)
RBC # BLD AUTO: 4.63 X10E6/UL (ref 3.77–5.28)
WBC # BLD AUTO: 7.8 X10E3/UL (ref 3.4–10.8)

## 2019-03-02 NOTE — PROGRESS NOTES
AMENDED REPORT  - ADDEMDUM ADDED 8/15/2018 - an    WOO BUTLER  : 1951  ACCOUNT:  [id number]  [phone]  PCP: Dr. Poncho Dodson     TODAY'S DATE: 08/15/2018  DICTATED BY:  [Dr. Frank Villanueva]    CHIEF COMPLAINT: [Followup of Dyspnea, Followup of Family history of AAA, Followup of Hypercholesteremia, pure, Followup of Hypertension, benign, Followup of Hypertension and white coat.]    HPI:  [On 08/15/2018, Woo Butler, a 66 year old male, presented with no interim cardiac complaints.]    This note was dictated via dragon therefore there could be unrecognized transcription errors.    No cardia vascular symptoms    In July while at work during stressful meeting he had a neurologic episode where he was confused.  The pattern was suggestive of TGA.  This was a diagnosis that was applied after he was evaluated in the emergency room.  Testing was unremarkable including a brain MR which showed only small white matter issues.  Because his blood pressure is elevated they put him on lisinopril.  Home blood pressures are now running in 120s.    In addition they put him on clopidogrel    RISK FACTORS:  CAD - Hypertension Family  AAA - Family History of AAA    REVIEW OF SYSTEMS:   CONS: 08/15/18,  Hospital f/u, doing well. EYES: denies significant visual changes. ENMT: denies difficulties with hearing, otherwise negative. CV: denies chest pain, palpitations and -unchanged. RESP: denies dyspnea and -unchanged. GI: denies melena, hematochezia. : no hematuria. INTEG: no new rashes, lesions. MS: knee and ankle pain. NEURO: denies dizziness, lightheadedness and -unchanged. HEM/LYMPH: denies easy bruising. ALL: no new allergies.      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 Significant for AAA.    SOCIAL HISTORY: SMOKING: Never used tobacco. denies smoking. CAFFEINE: rare. ALCOHOL: drinks socially.  Patient Name: Tay Ayala MRN: [id number] SUBJECTIVE Tay Ayala is a 41 y.o. female who presents with the following:  
 
Reports 3-day history of mild body aches, dizziness, and mild headache. States that it occurred suddenly Saturday evening at work. She works at a nursing home. Since then, she has been taking Advil which has been somewhat helpful. Denies any fever, URI symptoms, dysuria, abdomen pain, rashes. States that this occurs about every 3-4 months and usually resolves within 3 days. Is wondering if it is a virus. 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 Previously thought that headaches are not due to this finding. BP Readings from Last 3 Encounters:  
10/01/18 118/72  
04/13/18 126/84  
03/08/18 (!) 144/100 Review of Systems Constitutional: Positive for malaise/fatigue. Negative for fever and weight loss. Respiratory: Negative for cough, hemoptysis, shortness of breath and wheezing. Cardiovascular: Negative for chest pain, palpitations, leg swelling and PND. Gastrointestinal: Negative for abdominal pain, constipation, diarrhea, nausea and vomiting. Neurological: Positive for dizziness and headaches. The patient's medications, allergies, past medical history, surgical history, family history and social history were reviewed and updated where appropriate. Prior to Admission medications Medication Sig Start Date End Date Taking? Authorizing Provider  
chlorthalidone (HYGROTEN) 25 mg tablet Take 1 Tab by mouth daily. 4/13/18  Yes Sadi Noguera MD  
ibuprofen (ADVIL) 200 mg tablet Take 3-4 Tabs by mouth every six (6) hours as needed for Pain. Yes Magda Rogers MD  
acetaminophen (TYLENOL EXTRA STRENGTH) 500 mg tablet Take 2 Tabs by mouth every six (6) hours as needed for Pain. Yes Magda Rogers MD  
 
 
No Known Allergies EXERCISE: runs or bikes 2-3x;/week- sometimes less due to ankle and knee problems. DIET: balanced diet. MARITAL STATUS: . OCCUPATION: -Sales.     ALLERGIES: Penicillins - CLASS, ? questionable rash    MEDICATIONS: Selected prescriptions see below    VITAL SIGNS: [B/P - 144/92 , Pulse - 68, Respiration - 16, Weight -  199, Height -   72 , BMI - 27.0 ]    CONS: well developed, well nourished and BMI=26.4. WEIGHT: Discussed and diet, exercise program prescribed. HEAD/FACE: no trauma and normocephalic. EYES: conjunctivae not injected and no xanthelasma. ENT: mucosa pink and moist. NECK: jugular venous pressure not elevated. RESP: respirations with normal rate and rhythm, clear to auscultation. GI: no masses, tenderness or hepatosplenomegaly, rectal deferred and no bruits. MS: adequate gait for exercise/testing. EXT: no clubbing or cyanosis.  SKIN: no rashes, lesions, ulcers.  NEURO/PSYCH: alert and oriented to time, place and person and normal affect.      CV: PALP: PMI not palpable. AUSC:  regular rhythm, normal S1, S2 without S3; no pathologic murmurs. CAROTIDS: carotid pulses normal. ABD AORTA: difficult to assess abdominal aorta. PEDAL: pedal pulses intact. EXT: no peripheral edema.     LABORATORY DATA: From 2518 LDL is 56 HDL is 49 triglycerides are 91 renal function electrolytes are normal.    Brain MR was unremarkable except for some small white matter issues    DECISION MAKING:    Abnormal coronary calcium score 126 last unremarkable thallium almost 4 years ago asymptomatic with good risk factor control.    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    Dyslipidemia and pharmacologic therapy well-controlled.    Metabolic syndrome improved    Hypertension well-controlled on lisinopril with out of office blood pressures running in 120s    ASSESSMENT:  1. Dyspnea  2. Abnormal UFCT-  score 162  12/14/13  3. Family history of AAA  4. Hypercholesteremia, pure  5.  OBJECTIVE Visit Vitals  /72 (BP 1 Location: Right arm, BP Patient Position: Sitting)  Pulse 84  Temp 98.4 °F (36.9 °C) (Oral)  Resp 18  Ht 4' 9\" (1.448 m)  Wt 134 lb (60.8 kg)  LMP 09/04/2018 (Exact Date)  SpO2 97%  BMI 29 kg/m2 Physical Exam  
Constitutional: She is oriented to person, place, and time and well-developed, well-nourished, and in no distress. No distress. HENT:  
Head: Normocephalic and atraumatic. Right Ear: Tympanic membrane is not perforated and not erythematous. No middle ear effusion. No decreased hearing is noted. Left Ear: Tympanic membrane is not perforated and not erythematous. No middle ear effusion. No decreased hearing is noted. Nose: Nose normal. Right sinus exhibits no maxillary sinus tenderness and no frontal sinus tenderness. Left sinus exhibits no maxillary sinus tenderness and no frontal sinus tenderness. Mouth/Throat: Uvula is midline, oropharynx is clear and moist and mucous membranes are normal.  
Neck: Normal range of motion. Neck supple. Cardiovascular: Normal rate, regular rhythm and normal heart sounds. Exam reveals no gallop and no friction rub. No murmur heard. Pulmonary/Chest: Effort normal and breath sounds normal. No respiratory distress. She has no wheezes. Lymphadenopathy:  
  She has no cervical adenopathy. Neurological: She is alert and oriented to person, place, and time. No cranial nerve deficit. Gait normal.  
Negative Riesa Dienes Skin: She is not diaphoretic. Psychiatric: Mood, memory, affect and judgment normal.  
Nursing note and vitals reviewed. ASSESSMENT AND PLAN Ricky Michaels is a 41 y.o. female who presents today for: 1. Dizziness Vitals and exam appear to be within normal limits today. Possible due to viral etiology. Recommend adequate hydration, rest, and monitor for any worsening signs.  
discussed diagnosis & treatment options, most likely viral at this time, Hypertension, benign  6. Hypertension, white coat    PLAN: Continue present management.    Call if more episodes.  I think it is unlikely that they will recur and this was explained to him.    Return to clinic in 3-4 months to assess blood pressures.  He should check blood pressures twice a month.    At the next visit decide on timing of follow-up stress testing    Copy this note to his primary care physician    ADDENDUM:   He reported episodes of anxiety and requested medication.  In this regard, I will prescribe Xanax 0.25 mg to be taken p.r.n. episodes of anxiety; 10 tablets will be dispensed with no refills.  I explained to him that if this approach is not sufficient in controlling the issue, he would need to confer with his primary care physician.      PRESCRIPTIONS:   10/11/17 *Atorvastatin Calcium 10MG      1 TABLET DAILY.                          07/27/18 Clopidogrel Bisulfate 75MG      one daily                                07/27/18 Ibuprofen             600MG     one tablet twice daily                   07/27/18 Lisinopril            5MG       one tablet daily                         07/27/18 Omeprazole            20MG      one capsule every morning before hayder     07/27/18 ProAir HFA            108 (90   2 puffs every 4-6 hrs as needed            Frank Villanueva M.D.  BENJY/brooklyn-Job ID: [id number] (Addendum)  D: 08/15/2018    T: 08/15/2018   C: Dr. Poncho Dodson                       reviewed the importance of avoiding unnecessary antibiotic therapy, reviewed which OTC medications to use and avoid, expected time course for resolution & red flags were reviewed with her to RTC or notify me. 2. HTN, goal below 140/90 Stable, continue current treatment. - chlorthalidone (HYGROTEN) 25 mg tablet; Take 1 Tab by mouth daily. Dispense: 90 Tab; Refill: 1 I have reviewed/discussed the above normal BMI with the patient. I have recommended the following interventions: dietary management education, guidance, and counseling, encourage exercise, monitor weight and prescribed dietary intake. Medications Discontinued During This Encounter Medication Reason  chlorthalidone (HYGROTEN) 25 mg tablet Reorder Follow-up Disposition: 
Return in about 6 months (around 4/1/2019), or if symptoms worsen or fail to improve, for CPE (30 min). Medication risks/benefits/costs/interactions/alternatives discussed with patient. Advised patient to call back or return to office if symptoms worsen/change/persist. If patient cannot reach us or should anything more severe/urgent arise he/she should proceed directly to the nearest emergency department. Discussed expected course/resolution/complications of diagnosis in detail with patient. Patient given a written after visit summary which includes his/her diagnoses, current medications and vitals. Patient expressed understanding with the diagnosis and plan.   
 
Charlie Lane M.D.

## 2019-04-01 ENCOUNTER — OFFICE VISIT (OUTPATIENT)
Dept: FAMILY MEDICINE CLINIC | Age: 44
End: 2019-04-01

## 2019-04-01 VITALS
TEMPERATURE: 98.5 F | HEIGHT: 57 IN | HEART RATE: 85 BPM | WEIGHT: 137 LBS | OXYGEN SATURATION: 98 % | DIASTOLIC BLOOD PRESSURE: 66 MMHG | SYSTOLIC BLOOD PRESSURE: 124 MMHG | BODY MASS INDEX: 29.56 KG/M2 | RESPIRATION RATE: 18 BRPM

## 2019-04-01 DIAGNOSIS — I10 HTN, GOAL BELOW 140/90: ICD-10-CM

## 2019-04-01 DIAGNOSIS — N84.1 CERVICAL POLYP: ICD-10-CM

## 2019-04-01 DIAGNOSIS — E74.39 GLUCOSE INTOLERANCE: ICD-10-CM

## 2019-04-01 DIAGNOSIS — E78.5 HYPERLIPIDEMIA, UNSPECIFIED HYPERLIPIDEMIA TYPE: ICD-10-CM

## 2019-04-01 DIAGNOSIS — Z00.00 ROUTINE GENERAL MEDICAL EXAMINATION AT HEALTH CARE FACILITY: Primary | ICD-10-CM

## 2019-04-01 RX ORDER — BISMUTH SUBSALICYLATE 262 MG
1 TABLET,CHEWABLE ORAL DAILY
COMMUNITY
End: 2020-11-19 | Stop reason: ALTCHOICE

## 2019-04-01 RX ORDER — CHLORTHALIDONE 25 MG/1
25 TABLET ORAL DAILY
Qty: 90 TAB | Refills: 1 | Status: SHIPPED | OUTPATIENT
Start: 2019-04-01 | End: 2019-10-01 | Stop reason: SDUPTHER

## 2019-04-01 NOTE — PATIENT INSTRUCTIONS

## 2019-04-01 NOTE — PROGRESS NOTES
Chief Complaint Patient presents with  Complete Physical  
  not fasting 1. Have you been to the ER, urgent care clinic since your last visit? Hospitalized since your last visit? No 
 
2. Have you seen or consulted any other health care providers outside of the [address] [place] [address] since your last visit? Include any pap smears or colon screening.  No

## 2019-04-01 NOTE — PROGRESS NOTES
Patient Name: Rajani Hood MRN: [id number] SUBJECTIVE Rajani Hood is a 41 y.o. female who presents with the following:  
 
Cervical Cancer Screening: not up to date - reports no hx of abnormal paps. Breast Cancer Screening: no fhx of breast cancer or personal breast concerns CAD risk factors: HTN: wnl on meds BP Readings from Last 3 Encounters:  
04/01/19 124/66  
01/10/19 119/83  
10/01/18 118/72 Lipid: due Lab Results Component Value Date/Time Cholesterol, total 191 04/16/2018 08:42 AM  
 HDL Cholesterol 50 04/16/2018 08:42 AM  
 LDL, calculated 126 (H) 04/16/2018 08:42 AM  
 VLDL, calculated 15 04/16/2018 08:42 AM  
 Triglyceride 76 04/16/2018 08:42 AM  
 
DM: due Lab Results Component Value Date/Time Hemoglobin A1c 6.0 (H) 04/16/2018 08:42 AM  
 
 
Review of Systems Constitutional: Negative for fever, malaise/fatigue and weight loss. Respiratory: Negative for cough, hemoptysis, shortness of breath and wheezing. Cardiovascular: Negative for chest pain, palpitations, leg swelling and PND. Gastrointestinal: Negative for abdominal pain, constipation, diarrhea, nausea and vomiting. The patient's medications, allergies, past medical history, surgical history, family history and social history were reviewed and updated where appropriate. Prior to Admission medications Medication Sig Start Date End Date Taking? Authorizing Provider  
multivitamin (ONE A DAY) tablet Take 1 Tab by mouth daily. Yes Provider, Historical  
chlorthalidone (HYGROTEN) 25 mg tablet Take 1 Tab by mouth daily. 10/1/18  Yes Vikash Otero MD  
ibuprofen (ADVIL) 200 mg tablet Take 3-4 Tabs by mouth every six (6) hours as needed for Pain. Yes Edin Boothe MD  
acetaminophen (TYLENOL EXTRA STRENGTH) 500 mg tablet Take 2 Tabs by mouth every six (6) hours as needed for Pain. Yes Edin Boothe MD  
 
 
No Known Allergies OBJECTIVE Visit Vitals /66 (BP 1 Location: Right arm, BP Patient Position: Sitting) Pulse 85 Temp 98.5 °F (36.9 °C) (Oral) Resp 18 Ht 4' 9\" (1.448 m) Wt 137 lb (62.1 kg) LMP 03/21/2019 SpO2 98% BMI 29.65 kg/m² Physical Exam  
Constitutional: She is oriented to person, place, and time and well-developed, well-nourished, and in no distress. No distress. Eyes: Pupils are equal, round, and reactive to light. Conjunctivae and EOM are normal.  
Cardiovascular: Normal rate, regular rhythm and normal heart sounds. Exam reveals no gallop and no friction rub. No murmur heard. Pulmonary/Chest: Effort normal and breath sounds normal. No respiratory distress. She has no wheezes. Neurological: She is alert and oriented to person, place, and time. Skin: Skin is warm and dry. No rash noted. She is not diaphoretic. Psychiatric: Mood, memory, affect and judgment normal.  
Nursing note and vitals reviewed. Pelvic exam: VULVA: normal appearing vulva with no masses, tenderness or lesions, VAGINA: normal appearing vagina with normal color and discharge, no lesions, CERVIX: cervical polyp at os, about 3 cm in width. ASSESSMENT AND PLAN Lissette Denton is a 41 y.o. female who presents today for: 
 
1. Routine general medical examination at health care facility Reviewed age appropriate screening tests as recommended by the [place]PSTF Preventive Services Database with patient today. I have reviewed/discussed the above normal BMI with the patient. I have recommended the following interventions: dietary management education, guidance, and counseling, encourage exercise, monitor weight and prescribed dietary intake. 2. HTN, goal below 140/90 Stable, continue current treatment. - chlorthalidone (HYGROTEN) 25 mg tablet; Take 1 Tab by mouth daily. Dispense: 90 Tab; Refill: 1 3. Hyperlipidemia, unspecified hyperlipidemia type Will calculate ASCVD risk score pending labs.  
- METABOLIC PANEL, COMPREHENSIVE 
- LIPID PANEL 
 
 4. Glucose intolerance 
- HEMOGLOBIN A1C WITH EAG 5. Cervical polyp Attempted to do pap today but unable to complete due to large polyp obscuring os. Recommend pt to f/u with OBGYN. Medications Discontinued During This Encounter Medication Reason  chlorthalidone (HYGROTEN) 25 mg tablet Reorder Follow-up and Dispositions · Return in about 6 months (around 10/1/2019) for HTN follow up. Medication risks/benefits/costs/interactions/alternatives discussed with patient. Advised patient to call back or return to office if symptoms worsen/change/persist. If patient cannot reach us or should anything more severe/urgent arise he/she should proceed directly to the nearest emergency department. Discussed expected course/resolution/complications of diagnosis in detail with patient. Patient given a written after visit summary which includes his/her diagnoses, current medications and vitals. Patient expressed understanding with the diagnosis and plan. Floyd Ramirez M.D.

## 2019-04-04 LAB
ALBUMIN SERPL-MCNC: 4.1 G/DL (ref 3.5–5.5)
ALBUMIN/GLOB SERPL: 1.3 {RATIO} (ref 1.2–2.2)
ALP SERPL-CCNC: 67 IU/L (ref 39–117)
ALT SERPL-CCNC: 26 IU/L (ref 0–32)
AST SERPL-CCNC: 21 IU/L (ref 0–40)
BILIRUB SERPL-MCNC: 0.6 MG/DL (ref 0–1.2)
BUN SERPL-MCNC: 11 MG/DL (ref 6–24)
BUN/CREAT SERPL: 20 (ref 9–23)
CALCIUM SERPL-MCNC: 9.7 MG/DL (ref 8.7–10.2)
CHLORIDE SERPL-SCNC: 97 MMOL/L (ref 96–106)
CHOLEST SERPL-MCNC: 183 MG/DL (ref 100–199)
CO2 SERPL-SCNC: 26 MMOL/L (ref 20–29)
CREAT SERPL-MCNC: 0.55 MG/DL (ref 0.57–1)
EST. AVERAGE GLUCOSE BLD GHB EST-MCNC: 126 MG/DL
GLOBULIN SER CALC-MCNC: 3.1 G/DL (ref 1.5–4.5)
GLUCOSE SERPL-MCNC: 97 MG/DL (ref 65–99)
HBA1C MFR BLD: 6 % (ref 4.8–5.6)
HDLC SERPL-MCNC: 44 MG/DL
INTERPRETATION, 910389: NORMAL
LDLC SERPL CALC-MCNC: 119 MG/DL (ref 0–99)
POTASSIUM SERPL-SCNC: 3.1 MMOL/L (ref 3.5–5.2)
PROT SERPL-MCNC: 7.2 G/DL (ref 6–8.5)
SODIUM SERPL-SCNC: 138 MMOL/L (ref 134–144)
TRIGL SERPL-MCNC: 102 MG/DL (ref 0–149)
VLDLC SERPL CALC-MCNC: 20 MG/DL (ref 5–40)

## 2019-04-04 NOTE — PROGRESS NOTES
Please notify patient regarding their test results: 
 
Mildly high LDL cholesterol (the \"bad\" kind of cholesterol). Hemoglobin A1C (average blood sugar level for past 3 months) is in the pre diabetes range, which means your average sugar or glucose level is higher than normal. I would encourage healthy diet and regular exercise with the goal of maintaining a healthy weight before starting medications for this.

## 2019-04-10 ENCOUNTER — TELEPHONE (OUTPATIENT)
Dept: FAMILY MEDICINE CLINIC | Age: 44
End: 2019-04-10

## 2019-04-10 NOTE — TELEPHONE ENCOUNTER
----- Message from [employer] sent at 4/10/2019  1:02 PM EDT -----  Regarding: /Telephone  Pt is returning a call from the practice and would like to know the nature of the call.  Callback: [phone]

## 2019-04-10 NOTE — PROGRESS NOTES
Outbound bound to call to patient.  verified. Patient made aware of lab results and recommendations per Dr. Ben Carrera. Patient verbalized understanding.

## 2019-08-26 ENCOUNTER — OFFICE VISIT (OUTPATIENT)
Dept: OBGYN CLINIC | Age: 44
End: 2019-08-26

## 2019-08-26 VITALS
HEIGHT: 57 IN | WEIGHT: 135 LBS | DIASTOLIC BLOOD PRESSURE: 78 MMHG | BODY MASS INDEX: 29.12 KG/M2 | SYSTOLIC BLOOD PRESSURE: 116 MMHG

## 2019-08-26 DIAGNOSIS — Z01.419 WELL WOMAN EXAM: ICD-10-CM

## 2019-08-26 DIAGNOSIS — Z12.39 BREAST CANCER SCREENING: ICD-10-CM

## 2019-08-26 DIAGNOSIS — N92.6 IRREGULAR MENSES: ICD-10-CM

## 2019-08-26 DIAGNOSIS — N84.1 CERVICAL POLYP: Primary | ICD-10-CM

## 2019-08-26 DIAGNOSIS — Z11.51 SCREENING FOR HUMAN PAPILLOMAVIRUS: ICD-10-CM

## 2019-08-26 RX ORDER — MEDROXYPROGESTERONE ACETATE 10 MG/1
10 TABLET ORAL DAILY
Qty: 10 TAB | Refills: 11 | Status: SHIPPED | OUTPATIENT
Start: 2019-08-26 | End: 2019-09-05

## 2019-08-26 NOTE — PROGRESS NOTES
Annual exam    Emory Pickens is a 45 y.o.  A0 presenting for annual exam. Sent from PCP for pap due to concern for cervical polyp. Pt with lifelong h/o irregular menses (LMP 2 months ago). Pt reports h/o had 1 child 15 years ago, then struggled with infertility after that. Pt not using any contraception, sexually active with . She has never had a mammogram.    She is from [place], moved to [place] 5 years ago. Fluent English. Ob/Gyn Hx:   A0 -1 csection  LMP-19  Menarche- 14  Menses- irregular  Contraception-denies  STI- denies  ? 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    Migraine without status migrainosus, not intractable 3/24/2017       Past Surgical History:   Procedure Laterality Date    HX  SECTION      HX CYST REMOVAL Right 10/15/14    right hand       Family History   Problem Relation Age of Onset    Diabetes Mother     Hypertension Mother     No Known Problems Father        Social History     Socioeconomic History    Marital status:      Spouse name: Not on file    Number of children: Not on file    Years of education: Not on file    Highest education level: Not on file   Occupational History    Not on file   Social Needs    Financial resource strain: Not on file    Food insecurity:     Worry: Not on file     Inability: Not on file    Transportation needs:     Medical: Not on file     Non-medical: Not on file   Tobacco Use    Smoking status: Never Smoker    Smokeless tobacco: Never Used   Substance and Sexual Activity    Alcohol use: No    Drug use: No    Sexual activity: Yes     Partners: Male     Birth control/protection: None   Lifestyle  Physical activity:     Days per week: Not on file     Minutes per session: Not on file    Stress: Not on file   Relationships    Social connections:     Talks on phone: Not on file     Gets together: Not on file     Attends Confucianism service: Not on file     Active member of club or organization: Not on file     Attends meetings of clubs or organizations: Not on file     Relationship status: Not on file    Intimate partner violence:     Fear of current or ex partner: Not on file     Emotionally abused: Not on file     Physically abused: Not on file     Forced sexual activity: Not on file   Other Topics Concern    Not on file   Social History Narrative    Not on file       Current Outpatient Medications   Medication Sig Dispense Refill    chlorthalidone (HYGROTEN) 25 mg tablet Take 1 Tab by mouth daily. 90 Tab 1    multivitamin (ONE A DAY) tablet Take 1 Tab by mouth daily.  ibuprofen (ADVIL) 200 mg tablet Take 3-4 Tabs by mouth every six (6) hours as needed for Pain.  acetaminophen (TYLENOL EXTRA STRENGTH) 500 mg tablet Take 2 Tabs by mouth every six (6) hours as needed for Pain.          No Known Allergies    Review of Systems - History obtained from the patient  Constitutional: negative for weight loss, fever, night sweats  HEENT: negative for hearing loss, earache, congestion, snoring, sorethroat  CV: negative for chest pain, palpitations, edema  Resp: negative for cough, shortness of breath, wheezing  GI: negative for change in bowel habits, abdominal pain, black or bloody stools  : negative for frequency, dysuria, hematuria, +vaginal discharge, +AUB  MSK: negative for back pain, joint pain, muscle pain  Breast: negative for breast lumps, nipple discharge, galactorrhea  Skin :negative for itching, rash, hives  Neuro: negative for dizziness, headache, confusion, weakness  Psych: negative for anxiety, depression, change in mood  Heme/lymph: negative for bleeding, bruising, pallor    Physical Exam    Visit Vitals  /78 (BP 1 Location: Left arm, BP Patient Position: Sitting)   Ht 4' 9\" (1.448 m)   Wt 135 lb (61.2 kg)   LMP 06/16/2019   BMI 29.21 kg/m²       Constitutional  · Appearance: well-nourished, well developed, alert, in no acute distress    HENT  · Head and Face: appears normal    Neck  · Inspection/Palpation: normal appearance, no masses or tenderness  · Lymph Nodes: no lymphadenopathy present  · Thyroid: gland size normal, nontender, no nodules or masses present on palpation    Chest  · Respiratory Effort: non-labored breathing  · Auscultation: CTAB, normal breath sounds    Cardiovascular  · Heart:  · Auscultation: regular rate and rhythm without murmur  · Extremities: no peripheral edema    Breasts  · Inspection of Breasts: breasts symmetrical, no skin changes, no discharge present, nipple appearance normal, no skin retraction present  · Palpation of Breasts and Axillae: no masses present on palpation, no breast tenderness  · Axillary Lymph Nodes: no lymphadenopathy present    Gastrointestinal  · Abdominal Examination: abdomen non-tender to palpation, normal bowel sounds, no masses present  · Liver and spleen: no hepatomegaly present, spleen not palpable  · Hernias: no hernias identified    Genitourinary  · External Genitalia: normal appearance for age, no discharge present, no tenderness present, no inflammatory lesions present, no masses present, no atrophy present  · Vagina: normal vaginal vault without central or paravaginal defects, no discharge present, no inflammatory lesions present, no masses present  · Bladder: non-tender to palpation  · Urethra: appears normal  · Cervix: Large 4cm cervical polyp with broad stalk --> slightly atypical appearance  · Uterus: normal size, shape and consistency, though exam limited by habitus  · Adnexa: no adnexal tenderness present, no adnexal masses present  · Perineum: perineum within normal limits, no evidence of trauma, no rashes or skin lesions present    Skin  · General Inspection: no rash, no lesions identified    Neurologic/Psychiatric  · Mental Status:  · Orientation: grossly oriented to person, place and time  · Mood and Affect: mood normal, affect appropriate    TVUS 19  THE UTERUS IS RETROFLEXED, NORMAL IN SIZE AND ECHOGENICITY. THE ENDOMETRIUM MEASURES 7MM IN THICKNESS. NO MASSES OR ABNORMALITIES ARE SEEN. RIGHT OVARY APPEARS WNL. LEFT OVARY APPEARS WNL. NO FREE FLUID IS SEEN IN THE CDS. THE CERVICAL CANAL APPEARS TO BE HYPREVASCULAR. CLINICAL COORELATION RECOMMENDED. Assessment/Plan:  45 y.o. Shirley Fuller presenting for annual exam. Overall doing well. +cervical polyp and AUB.     Health Maintenance:  -counseled re: diet, exercise, healthy lifestyle  -pap/HPV will have to be deferred until after cervical polypectomy  -STI screening declined  -Gardasil - reviewed age extension, pt declines  -refer for mammo    AUB: lifelong irregular menses  -TSH, PRL, PCOS labs  -TVUS today (findings above)  -advised starting on progestin-only therapy for endometrial protection --> desires cyclic progestin therapy as still desires pregnancy and would like to avoid contraception  -Rx for Provera 10 x 10 provided    Cervical polyp: too large to safely remove in office, with large stalk and concern for vascular pedicle, slightly unusual appearance, must exclude malignancy  -TVUS as above excluded other uterine pathology (however uterus retroflexed with prominent  scar)  -advised surgical removal of cervical polyp in OR due to size of polyp and concern for bleeding  -pt posted for cervical polypectomy, possible hysteroscopy D&C under MAC (consents completed today, also consents for blood in event of emergency)    Apolinar Romero MD  2019  11:22 AM

## 2019-10-01 ENCOUNTER — OFFICE VISIT (OUTPATIENT)
Dept: FAMILY MEDICINE CLINIC | Age: 44
End: 2019-10-01

## 2019-10-01 VITALS
SYSTOLIC BLOOD PRESSURE: 118 MMHG | RESPIRATION RATE: 18 BRPM | TEMPERATURE: 98 F | HEIGHT: 57 IN | DIASTOLIC BLOOD PRESSURE: 84 MMHG | WEIGHT: 136 LBS | BODY MASS INDEX: 29.34 KG/M2 | OXYGEN SATURATION: 97 % | HEART RATE: 88 BPM

## 2019-10-01 DIAGNOSIS — I10 HTN, GOAL BELOW 140/90: Primary | ICD-10-CM

## 2019-10-01 DIAGNOSIS — Z23 ENCOUNTER FOR IMMUNIZATION: ICD-10-CM

## 2019-10-01 RX ORDER — CHLORTHALIDONE 25 MG/1
25 TABLET ORAL DAILY
Qty: 90 TAB | Refills: 1 | Status: SHIPPED | OUTPATIENT
Start: 2019-10-01 | End: 2019-10-24 | Stop reason: SINTOL

## 2019-10-01 RX ORDER — MEDROXYPROGESTERONE ACETATE 10 MG/1
10 TABLET ORAL DAILY
COMMUNITY
End: 2019-12-20

## 2019-10-01 NOTE — PATIENT INSTRUCTIONS
You will be having a cervical polypectomy to remove a polyp on your cervix DASH Diet: Care Instructions Your Care Instructions The DASH diet is an eating plan that can help lower your blood pressure. DASH stands for Dietary Approaches to Stop Hypertension. Hypertension is high blood pressure. The DASH diet focuses on eating foods that are high in calcium, potassium, and magnesium. These nutrients can lower blood pressure. The foods that are highest in these nutrients are fruits, vegetables, low-fat dairy products, nuts, seeds, and legumes. But taking calcium, potassium, and magnesium supplements instead of eating foods that are high in those nutrients does not have the same effect. The DASH diet also includes whole grains, fish, and poultry. The DASH diet is one of several lifestyle changes your doctor may recommend to lower your high blood pressure. Your doctor may also want you to decrease the amount of sodium in your diet. Lowering sodium while following the DASH diet can lower blood pressure even further than just the DASH diet alone. Follow-up care is a key part of your treatment and safety. Be sure to make and go to all appointments, and call your doctor if you are having problems. It's also a good idea to know your test results and keep a list of the medicines you take. How can you care for yourself at home? Following the DASH diet · Eat 4 to 5 servings of fruit each day. A serving is 1 medium-sized piece of fruit, ½ cup chopped or canned fruit, 1/4 cup dried fruit, or 4 ounces (½ cup) of fruit juice. Choose fruit more often than fruit juice. · Eat 4 to 5 servings of vegetables each day. A serving is 1 cup of lettuce or raw leafy vegetables, ½ cup of chopped or cooked vegetables, or 4 ounces (½ cup) of vegetable juice. Choose vegetables more often than vegetable juice. · Get 2 to 3 servings of low-fat and fat-free dairy each day.  A serving is 8 ounces of milk, 1 cup of yogurt, or 1 ½ ounces of cheese. · Eat 6 to 8 servings of grains each day. A serving is 1 slice of bread, 1 ounce of dry cereal, or ½ cup of cooked rice, pasta, or cooked cereal. Try to choose whole-grain products as much as possible. · Limit lean meat, poultry, and fish to 2 servings each day. A serving is 3 ounces, about the size of a deck of cards. · Eat 4 to 5 servings of nuts, seeds, and legumes (cooked dried beans, lentils, and split peas) each week. A serving is 1/3 cup of nuts, 2 tablespoons of seeds, or ½ cup of cooked beans or peas. · Limit fats and oils to 2 to 3 servings each day. A serving is 1 teaspoon of vegetable oil or 2 tablespoons of salad dressing. · Limit sweets and added sugars to 5 servings or less a week. A serving is 1 tablespoon jelly or jam, ½ cup sorbet, or 1 cup of lemonade. · Eat less than 2,300 milligrams (mg) of sodium a day. If you limit your sodium to 1,500 mg a day, you can lower your blood pressure even more. Tips for success · Start small. Do not try to make dramatic changes to your diet all at once. You might feel that you are missing out on your favorite foods and then be more likely to not follow the plan. Make small changes, and stick with them. Once those changes become habit, add a few more changes. · Try some of the following: ? Make it a goal to eat a fruit or vegetable at every meal and at snacks. This will make it easy to get the recommended amount of fruits and vegetables each day. ? Try yogurt topped with fruit and nuts for a snack or healthy dessert. ? Add lettuce, tomato, cucumber, and onion to sandwiches. ? Combine a ready-made pizza crust with low-fat mozzarella cheese and lots of vegetable toppings. Try using tomatoes, squash, spinach, broccoli, carrots, cauliflower, and onions. ? Have a variety of cut-up vegetables with a low-fat dip as an appetizer instead of chips and dip. ? Sprinkle sunflower seeds or chopped almonds over salads. Or try adding chopped walnuts or almonds to cooked vegetables. ? Try some vegetarian meals using beans and peas. Add garbanzo or kidney beans to salads. Make burritos and tacos with mashed alvares beans or black beans. Where can you learn more? Go to http://gómez-william.info/. Enter F422 in the search box to learn more about \"DASH Diet: Care Instructions. \" Current as of: April 9, 2019 Content Version: 12.2 © 1320-1243 [employer]. Care instructions adapted under license by [employer] (which disclaims liability or warranty for this information). If you have questions about a medical condition or this instruction, always ask your healthcare professional. [address] any warranty or liability for your use of this information.

## 2019-10-01 NOTE — PROGRESS NOTES
Patient Name: Leyda Montero   MRN: [id number]    Parvin Arguello is a 45 y.o. female who presents with the following: The patient has hypertension. She reports taking medications as instructed, no medication side effects noted. Diet and Lifestyle: generally follows a low fat low cholesterol diet, generally follows a low sodium diet, no formal exercise but active during the day. Lab review: no lab studies available for review at time of visit. Has upcoming cervical polypectomy coming up. BP Readings from Last 3 Encounters:   10/01/19 118/84   08/26/19 116/78   04/01/19 124/66     Review of Systems   Constitutional: Negative for fever, malaise/fatigue and weight loss. Respiratory: Negative for cough, hemoptysis, shortness of breath and wheezing. Cardiovascular: Negative for chest pain, palpitations, leg swelling and PND. Gastrointestinal: Negative for abdominal pain, constipation, diarrhea, nausea and vomiting. The patient's medications, allergies, past medical history, surgical history, family history and social history were reviewed and updated where appropriate. Prior to Admission medications    Medication Sig Start Date End Date Taking? Authorizing Provider   medroxyPROGESTERone (PROVERA) 10 mg tablet Take 10 mg by mouth daily. Take 1 tab for 10 days, then stop. Will take for a few months. Yes Provider, Historical   multivitamin (ONE A DAY) tablet Take 1 Tab by mouth daily. Yes Provider, Historical   chlorthalidone (HYGROTEN) 25 mg tablet Take 1 Tab by mouth daily. 4/1/19  Yes José Miguel Gallo MD   ibuprofen (ADVIL) 200 mg tablet Take 3-4 Tabs by mouth every six (6) hours as needed for Pain. Yes Alison Mcmillan MD   acetaminophen (TYLENOL EXTRA STRENGTH) 500 mg tablet Take 2 Tabs by mouth every six (6) hours as needed for Pain.    Yes Alison Mcmillan MD       No Known Allergies        OBJECTIVE    Visit Vitals  /84 (BP 1 Location: Right arm, BP Patient Position: Sitting)   Pulse 88   Temp 98 °F (36.7 °C) (Oral)   Resp 18   Ht 4' 9\" (1.448 m)   Wt 136 lb (61.7 kg)   LMP 09/16/2019 (Exact Date)   SpO2 97%   BMI 29.43 kg/m²       Physical Exam   Constitutional: She is oriented to person, place, and time and well-developed, well-nourished, and in no distress. No distress. Eyes: Pupils are equal, round, and reactive to light. Conjunctivae and EOM are normal.   Cardiovascular: Normal rate, regular rhythm and normal heart sounds. Exam reveals no gallop and no friction rub. No murmur heard. Pulmonary/Chest: Effort normal and breath sounds normal. No respiratory distress. She has no wheezes. Neurological: She is alert and oriented to person, place, and time. Skin: Skin is warm and dry. No rash noted. She is not diaphoretic. Psychiatric: Mood, memory, affect and judgment normal.   Nursing note and vitals reviewed. ASSESSMENT AND PLAN  Patsy Rangel is a 45 y.o. female who presents today for:    1. HTN, goal below 140/90  Stable, continue current treatment. 2. Encounter for immunization  - INFLUENZA VIRUS VAC QUAD,SPLIT,PRESV FREE SYRINGE IM  - WI IMMUNIZ ADMIN,1 SINGLE/COMB VAC/TOXOID       There are no discontinued medications. Follow-up and Dispositions    · Return in about 6 months (around 4/1/2020) for CPE (30 min). Medication risks/benefits/costs/interactions/alternatives discussed with patient. Advised patient to call back or return to office if symptoms worsen/change/persist. If patient cannot reach us or should anything more severe/urgent arise he/she should proceed directly to the nearest emergency department. Discussed expected course/resolution/complications of diagnosis in detail with patient. Patient given a written after visit summary which includes his/her diagnoses, current medications and vitals. Patient expressed understanding with the diagnosis and plan. Floyd Bermudez M.D.

## 2019-10-01 NOTE — PROGRESS NOTES
Chief Complaint   Patient presents with    Hypertension     follow up     1. Have you been to the ER, urgent care clinic since your last visit? Hospitalized since your last visit? No    2. Have you seen or consulted any other health care providers outside of the [address] [place] [address] since your last visit? Include any pap smears or colon screening.  No

## 2019-10-14 ENCOUNTER — TELEPHONE (OUTPATIENT)
Dept: OBGYN CLINIC | Age: 44
End: 2019-10-14

## 2019-10-14 NOTE — TELEPHONE ENCOUNTER
Call received at 541am    45year old patient last seen in the office on 8/26/19. [employer] verified to speak to Ruslan Ventura regarding his spouse. Patient scheduled for surgery on 10/28/19 .  is states he got a letter from PAT and is wondering about when the testing will be. This nurse advised that PAT will call them to set up the time for the preadmission testing.  verbalize understanding.

## 2019-10-16 DIAGNOSIS — N84.1 CERVICAL POLYP: Primary | ICD-10-CM

## 2019-10-23 ENCOUNTER — TELEPHONE (OUTPATIENT)
Dept: OBGYN CLINIC | Age: 44
End: 2019-10-23

## 2019-10-23 ENCOUNTER — HOSPITAL ENCOUNTER (OUTPATIENT)
Dept: PREADMISSION TESTING | Age: 44
Discharge: HOME | End: 2019-10-23
Payer: COMMERCIAL

## 2019-10-23 VITALS
BODY MASS INDEX: 29.85 KG/M2 | HEIGHT: 57 IN | TEMPERATURE: 97.9 F | RESPIRATION RATE: 18 BRPM | WEIGHT: 138.38 LBS | SYSTOLIC BLOOD PRESSURE: 114 MMHG | DIASTOLIC BLOOD PRESSURE: 77 MMHG | HEART RATE: 76 BPM

## 2019-10-23 DIAGNOSIS — N84.1 CERVICAL POLYP: ICD-10-CM

## 2019-10-23 LAB
ABO + RH BLD: NORMAL
ANION GAP SERPL CALC-SCNC: 7 MMOL/L (ref 5–15)
ATRIAL RATE: 76 BPM
BLOOD GROUP ANTIBODIES SERPL: NORMAL
BUN SERPL-MCNC: 9 MG/DL (ref 6–20)
BUN/CREAT SERPL: 16 (ref 12–20)
CALCIUM SERPL-MCNC: 9 MG/DL (ref 8.5–10.1)
CALCULATED P AXIS, ECG09: 34 DEGREES
CALCULATED R AXIS, ECG10: 16 DEGREES
CALCULATED T AXIS, ECG11: 66 DEGREES
CHLORIDE SERPL-SCNC: 102 MMOL/L (ref 97–108)
CO2 SERPL-SCNC: 30 MMOL/L (ref 21–32)
CREAT SERPL-MCNC: 0.58 MG/DL (ref 0.55–1.02)
DIAGNOSIS, 93000: NORMAL
GLUCOSE SERPL-MCNC: 143 MG/DL (ref 65–100)
P-R INTERVAL, ECG05: 148 MS
POTASSIUM SERPL-SCNC: 2.7 MMOL/L (ref 3.5–5.1)
Q-T INTERVAL, ECG07: 390 MS
QRS DURATION, ECG06: 82 MS
QTC CALCULATION (BEZET), ECG08: 438 MS
SODIUM SERPL-SCNC: 139 MMOL/L (ref 136–145)
SPECIMEN EXP DATE BLD: NORMAL
VENTRICULAR RATE, ECG03: 76 BPM

## 2019-10-23 PROCEDURE — 80048 BASIC METABOLIC PNL TOTAL CA: CPT

## 2019-10-23 PROCEDURE — 36415 COLL VENOUS BLD VENIPUNCTURE: CPT

## 2019-10-23 PROCEDURE — 93005 ELECTROCARDIOGRAM TRACING: CPT

## 2019-10-23 PROCEDURE — 86900 BLOOD TYPING SEROLOGIC ABO: CPT

## 2019-10-23 NOTE — TELEPHONE ENCOUNTER
[facility] lab called with critical value for pt during pre-op testing. K 2.7    Pt was then called and she denies chest pain or palpitations, feels well. I recommended going to urgent care for ECG and eval for her K<3.0, asymptomatic. Advised in the morning to also call PCP. She understands and will go to [facility] right now.       Cara Zuniga MD

## 2019-10-23 NOTE — PERIOP NOTES
PAT INTERVIEW COMPLETED WITH PT.  INFECTION PREVENTION INFORMATION GIVEN TO PT.  PT WAS GIVEN THE OPPORTUNITY TO ASK ADDITIONAL QUESTIONS.    2 BOTTLES OF CHG SOAP AND DIRECTIONS GIVEN TO PT

## 2019-10-24 ENCOUNTER — OFFICE VISIT (OUTPATIENT)
Dept: FAMILY MEDICINE CLINIC | Age: 44
End: 2019-10-24

## 2019-10-24 ENCOUNTER — TELEPHONE (OUTPATIENT)
Dept: OBGYN CLINIC | Age: 44
End: 2019-10-24

## 2019-10-24 VITALS
HEIGHT: 56 IN | TEMPERATURE: 98.2 F | OXYGEN SATURATION: 96 % | RESPIRATION RATE: 17 BRPM | DIASTOLIC BLOOD PRESSURE: 86 MMHG | HEART RATE: 91 BPM | WEIGHT: 136.6 LBS | BODY MASS INDEX: 30.73 KG/M2 | SYSTOLIC BLOOD PRESSURE: 125 MMHG

## 2019-10-24 DIAGNOSIS — T50.2X5A DIURETIC-INDUCED HYPOKALEMIA: ICD-10-CM

## 2019-10-24 DIAGNOSIS — I10 HTN, GOAL BELOW 140/90: Primary | ICD-10-CM

## 2019-10-24 DIAGNOSIS — E87.6 DIURETIC-INDUCED HYPOKALEMIA: ICD-10-CM

## 2019-10-24 RX ORDER — POTASSIUM CHLORIDE 20 MEQ/1
40 TABLET, EXTENDED RELEASE ORAL DAILY
Qty: 28 TAB | Refills: 0 | Status: SHIPPED | OUTPATIENT
Start: 2019-10-24 | End: 2019-11-07

## 2019-10-24 RX ORDER — AMLODIPINE BESYLATE 5 MG/1
5 TABLET ORAL DAILY
Qty: 90 TAB | Refills: 1 | Status: SHIPPED | OUTPATIENT
Start: 2019-10-24 | End: 2020-01-29 | Stop reason: SDUPTHER

## 2019-10-24 NOTE — TELEPHONE ENCOUNTER
Patient of SP. Having surgery on 10/28/19 with SP for Cervical Polypectomy and possible Hysteroscopy D&C. Patient left message on voicemail speaking VERY fast with strong accent. She had PAT testing and had some abnormal labs. She said that she has low potassium. She was told she needed to follow up with her PCP and start Potassium meds for one week and recheck. Does this postpone the surgery? Please advise. Patient is heading to work and will not be able to get to the phone for return call. She gave permission and is on hipaa for clearance for her , Sharon Almeida for us to speak to him at [phone] or leave phone message.

## 2019-10-24 NOTE — TELEPHONE ENCOUNTER
MD Robert Carballo, LPN   Caller: Unspecified (Today, 12:27 PM)             We can keep her surgery as scheduled for now, please advise her to take potassium as prescribed, we will check her potassium level on the morning of surgery before we take her back to the operating room, if it is still too low, we may have to reschedule at that time.     Previous Messages

## 2019-10-24 NOTE — PATIENT INSTRUCTIONS
Your potasium is low and this may be side effect of your blood pressure medicine called chlorthalidone. STOP taking chlorthalidone and START taking amlodipine AND potassium pill. Return to clinic in 1 week for office visit to check blood pressure and recheck you potassium level. Hypokalemia: Care Instructions  Your Care Instructions    Hypokalemia (say \"yb-[id number]-GETACHEW-winston-uh\") is a low level of potassium. The heart, muscles, kidneys, and nervous system all need potassium to work well. This problem has many different causes. Kidney problems, diet, and medicines like diuretics and laxatives can cause it. So can vomiting or diarrhea. In some cases, cancer is the cause. Your doctor may do tests to find the cause of your low potassium levels. You may need medicines to bring your potassium levels back to normal. You may also need regular blood tests to check your potassium. If you have very low potassium, you may need intravenous (IV) medicines. You also may need tests to check the electrical activity of your heart. Heart problems caused by low potassium levels can be very serious. Follow-up care is a key part of your treatment and safety. Be sure to make and go to all appointments, and call your doctor if you are having problems. It's also a good idea to know your test results and keep a list of the medicines you take. How can you care for yourself at home? · If your doctor recommends it, eat foods that have a lot of potassium. These include fresh fruits, juices, and vegetables. They also include nuts, beans, and milk. · Be safe with medicines. If your doctor prescribes medicines or potassium supplements, take them exactly as directed. Call your doctor if you have any problems with your medicines. · Get your potassium levels tested as often as your doctor tells you. When should you call for help? Call 911 anytime you think you may need emergency care.  For example, call if:    · You feel like your heart is missing beats. Heart problems caused by low potassium can cause death.     · You passed out (lost consciousness).     · You have a seizure.    Call your doctor now or seek immediate medical care if:    · You feel weak or unusually tired.     · You have severe arm or leg cramps.     · You have tingling or numbness.     · You feel sick to your stomach, or you vomit.     · You have belly cramps.     · You feel bloated or constipated.     · You have to urinate a lot.     · You feel very thirsty most of the time.     · You are dizzy or lightheaded, or you feel like you may faint.     · You feel depressed, or you lose touch with reality.    Watch closely for changes in your health, and be sure to contact your doctor if:    · You do not get better as expected. Where can you learn more? Go to http://gómezNanomed Pharameceuticalswilliam.info/. Enter G358 in the search box to learn more about \"Hypokalemia: Care Instructions. \"  Current as of: November 6, 2018  Content Version: 12.2  © 4968-9668 [employer]. Care instructions adapted under license by [employer] (which disclaims liability or warranty for this information). If you have questions about a medical condition or this instruction, always ask your healthcare professional. [address] any warranty or liability for your use of this information. DASH Diet: Care Instructions  Your Care Instructions    The DASH diet is an eating plan that can help lower your blood pressure. DASH stands for Dietary Approaches to Stop Hypertension. Hypertension is high blood pressure. The DASH diet focuses on eating foods that are high in calcium, potassium, and magnesium. These nutrients can lower blood pressure. The foods that are highest in these nutrients are fruits, vegetables, low-fat dairy products, nuts, seeds, and legumes.  But taking calcium, potassium, and magnesium supplements instead of eating foods that are high in those nutrients does not have the same effect. The DASH diet also includes whole grains, fish, and poultry. The DASH diet is one of several lifestyle changes your doctor may recommend to lower your high blood pressure. Your doctor may also want you to decrease the amount of sodium in your diet. Lowering sodium while following the DASH diet can lower blood pressure even further than just the DASH diet alone. Follow-up care is a key part of your treatment and safety. Be sure to make and go to all appointments, and call your doctor if you are having problems. It's also a good idea to know your test results and keep a list of the medicines you take. How can you care for yourself at home? Following the DASH diet  · Eat 4 to 5 servings of fruit each day. A serving is 1 medium-sized piece of fruit, ½ cup chopped or canned fruit, 1/4 cup dried fruit, or 4 ounces (½ cup) of fruit juice. Choose fruit more often than fruit juice. · Eat 4 to 5 servings of vegetables each day. A serving is 1 cup of lettuce or raw leafy vegetables, ½ cup of chopped or cooked vegetables, or 4 ounces (½ cup) of vegetable juice. Choose vegetables more often than vegetable juice. · Get 2 to 3 servings of low-fat and fat-free dairy each day. A serving is 8 ounces of milk, 1 cup of yogurt, or 1 ½ ounces of cheese. · Eat 6 to 8 servings of grains each day. A serving is 1 slice of bread, 1 ounce of dry cereal, or ½ cup of cooked rice, pasta, or cooked cereal. Try to choose whole-grain products as much as possible. · Limit lean meat, poultry, and fish to 2 servings each day. A serving is 3 ounces, about the size of a deck of cards. · Eat 4 to 5 servings of nuts, seeds, and legumes (cooked dried beans, lentils, and split peas) each week. A serving is 1/3 cup of nuts, 2 tablespoons of seeds, or ½ cup of cooked beans or peas. · Limit fats and oils to 2 to 3 servings each day.  A serving is 1 teaspoon of vegetable oil or 2 tablespoons of salad dressing. · Limit sweets and added sugars to 5 servings or less a week. A serving is 1 tablespoon jelly or jam, ½ cup sorbet, or 1 cup of lemonade. · Eat less than 2,300 milligrams (mg) of sodium a day. If you limit your sodium to 1,500 mg a day, you can lower your blood pressure even more. Tips for success  · Start small. Do not try to make dramatic changes to your diet all at once. You might feel that you are missing out on your favorite foods and then be more likely to not follow the plan. Make small changes, and stick with them. Once those changes become habit, add a few more changes. · Try some of the following:  ? Make it a goal to eat a fruit or vegetable at every meal and at snacks. This will make it easy to get the recommended amount of fruits and vegetables each day. ? Try yogurt topped with fruit and nuts for a snack or healthy dessert. ? Add lettuce, tomato, cucumber, and onion to sandwiches. ? Combine a ready-made pizza crust with low-fat mozzarella cheese and lots of vegetable toppings. Try using tomatoes, squash, spinach, broccoli, carrots, cauliflower, and onions. ? Have a variety of cut-up vegetables with a low-fat dip as an appetizer instead of chips and dip. ? Sprinkle sunflower seeds or chopped almonds over salads. Or try adding chopped walnuts or almonds to cooked vegetables. ? Try some vegetarian meals using beans and peas. Add garbanzo or kidney beans to salads. Make burritos and tacos with mashed alvares beans or black beans. Where can you learn more? Go to http://gómez-william.info/. Enter X939 in the search box to learn more about \"DASH Diet: Care Instructions. \"  Current as of: April 9, 2019  Content Version: 12.2  © 9464-9506 [employer]. Care instructions adapted under license by [employer] (which disclaims liability or warranty for this information).  If you have questions about a medical condition or this instruction, always ask your healthcare professional. [address] any warranty or liability for your use of this information.

## 2019-10-24 NOTE — TELEPHONE ENCOUNTER
10/24/19  1:26 pm-  Spoke with patient and she is calling again since no answer. I have advised her that message has been sent to [facility] and we are waiting on response. She is just anxious.

## 2019-10-24 NOTE — PROGRESS NOTES
[facility] Note      Subjective:     Chief Complaint   Patient presents with    Pre-op Exam     10/28/2019 Cervical Polypectomy Possible Hysteroscopy Dilation and Nathan Burt MD     Gladis Saez is a 45y.o. year old female who presents for evaluation of the following:      Low potassium:   Planning GYN surgery and found to have hypokalemia on preop testing. Endorses occasional muscle ache, none currently  Takes chlorthalidone for high blood pressure since 3/2018  Tren K: 3.7 (4/2018) > 3.1 (4/2019)> 2.7 (10/23/19)  Denies chest pain, palpitations, weakness, change in medicaitons  Lab Results   Component Value Date/Time    Potassium 2.7 (LL) 10/23/2019 02:00 PM       HTN:   Tx: Chlorthalidone  No home monitoring  BP Readings from Last 3 Encounters:   10/24/19 125/86   10/23/19 114/77   10/01/19 118/84       Review of Systems   Pertinent positives and negative per HPI. All other systems  reviewed are negative for a Comprehensive ROS (10+).        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    Migraine without status migrainosus, not intractable 3/24/2017        Social History     Socioeconomic History    Marital status:      Spouse name: Not on file    Number of children: Not on file    Years of education: Not on file    Highest education level: Not on file   Occupational History    Not on file   Social Needs    Financial resource strain: Not on file    Food insecurity:     Worry: Not on file     Inability: Not on file    Transportation needs:     Medical: Not on file     Non-medical: Not on file   Tobacco Use    Smoking status: Never Smoker    Smokeless tobacco: Never Used   Substance and Sexual Activity    Alcohol use: Never     Frequency: Never    Drug use: No    Sexual activity: Yes     Partners: Male     Birth control/protection: None   Lifestyle    Physical activity:     Days per week: Not on file     Minutes per session: Not on file    Stress: Not on file   Relationships    Social connections:     Talks on phone: Not on file     Gets together: Not on file     Attends [employer] service: Not on file     Active member of club or organization: Not on file     Attends meetings of clubs or organizations: Not on file     Relationship status: Not on file    Intimate partner violence:     Fear of current or ex partner: Not on file     Emotionally abused: Not on file     Physically abused: Not on file     Forced sexual activity: Not on file   Other Topics Concern    Not on file   Social History Narrative    Not on file       Family History   Problem Relation Age of Onset    Diabetes Mother     Hypertension Mother     Stroke Father     Hypertension Father     No Known Problems Son     Anesth Problems Neg Hx        Current Outpatient Medications   Medication Sig    chlorthalidone (HYGROTEN) 25 mg tablet Take 1 Tab by mouth daily. (Patient taking differently: Take 25 mg by mouth nightly.)    multivitamin (ONE A DAY) tablet Take 1 Tab by mouth daily.  ibuprofen (ADVIL) 200 mg tablet Take 3-4 Tabs by mouth every six (6) hours as needed for Pain.  acetaminophen (TYLENOL EXTRA STRENGTH) 500 mg tablet Take 2 Tabs by mouth every six (6) hours as needed for Pain.  medroxyPROGESTERone (PROVERA) 10 mg tablet Take 10 mg by mouth daily. Take 1 tab for 10 days, then stop. Will take for a few months. PT TAKES THIS THE 1ST-10TH OF EACH MONTH     No current facility-administered medications for this visit.               Objective:     Vitals:    10/24/19 1009   BP: 125/86   Pulse: 91   Resp: 17   Temp: 98.2 °F (36.8 °C)   TempSrc: Oral   SpO2: 96%   Weight: 136 lb 9.6 oz (62 kg)   Height: 4' 7.5\" (1.41 m)       Physical Examination:  General: Alert, cooperative, no distress, appears stated age. Eyes: Conjunctivae clear. PERRL, EOMs intact. Ears: Normal external ear canals both ears. Nose: Nares normal.   Mouth/Throat: Lips, mucosa, and tongue normal.   Neck: Supple, symmetrical, trachea midline, no adenopathy. No thyroid enlargement/tenderness/nodules  Respiratory: Breathing comfortably, in no acute respiratory distress. Clear to auscultation bilaterally. Normal inspiratory and expiratory ratio. Cardiovascular: Regular rate and rhythm, S1, S2 normal, no murmur, click, rub or gallop.   -Extremities no edema. Pulses 2+ and symmetric radial  Abdomen: Soft, non-tender, not distended. Bowel sounds normal.   MSK: Extremities normal, atraumatic, no effusion. Gait steady and unassisted. Skin: Skin color, texture, turgor normal. No rashes or lesions on exposed skin. Lymph nodes: Cervical, supraclavicular nodes normal.  Neurologic: CNII-XII intact.    Psychiatric: Affect appropriate    Hospital Outpatient Visit on 10/23/2019   Component Date Value Ref Range Status    Ventricular Rate 10/23/2019 76  BPM Final    Atrial Rate 10/23/2019 76  BPM Final    P-R Interval 10/23/2019 148  ms Final    QRS Duration 10/23/2019 82  ms Final    Q-T Interval 10/23/2019 390  ms Final    QTC Calculation (Bezet) 10/23/2019 438  ms Final    Calculated P Axis 10/23/2019 34  degrees Final    Calculated R Axis 10/23/2019 16  degrees Final    Calculated T Axis 10/23/2019 66  degrees Final    Diagnosis 10/23/2019    Final                    Value:Normal sinus rhythm  Inferior infarct , age undetermined  Poor R-wave Progression (consider lead placement or loss of anterior forces)  Abnormal ECG  When compared with ECG of 27-AUG-2014 12:03,  Inferior infarct is now present  Confirmed by Lauren Coto M.D., Kelin Mancilla ([id number]) on 10/23/2019 2:17:44 PM      Sodium 10/23/2019 139  136 - 145 mmol/L Final    Potassium 10/23/2019 2.7* 3.5 - 5.1 mmol/L Final    Comment: RESULTS VERIFIED, PHONED TO Norma Messer MD @ 1609 / TSP      Chloride 10/23/2019 102  97 - 108 mmol/L Final    CO2 10/23/2019 30  21 - 32 mmol/L Final    Anion gap 10/23/2019 7  5 - 15 mmol/L Final    Glucose 10/23/2019 143* 65 - 100 mg/dL Final    BUN 10/23/2019 9  6 - 20 MG/DL Final    Creatinine 10/23/2019 0.58  0.55 - 1.02 MG/DL Final    BUN/Creatinine ratio 10/23/2019 16  12 - 20   Final    GFR est AA 10/23/2019 >60  >60 ml/min/1.73m2 Final    GFR est non-AA 10/23/2019 >60  >60 ml/min/1.73m2 Final    Comment: Estimated GFR is calculated using the IDMS-traceable Modification of Diet in Renal Disease (MDRD) Study equation, reported for both  Americans (GFRAA) and non- Americans (GFRNA), and normalized to 1.73m2 body surface area. The physician must decide which value applies to the patient. The MDRD study equation should only be used in individuals age 25 or older. It has not been validated for the following: pregnant women, patients with serious comorbid conditions, or on certain medications, or persons with extremes of body size, muscle mass, or nutritional status.  Calcium 10/23/2019 9.0  8.5 - 10.1 MG/DL Final    Crossmatch Expiration 10/23/2019 10/31/2019   Final    ABO/Rh(D) 10/23/2019 A POSITIVE   Final    Antibody screen 10/23/2019 NEG   Final           Assessment/ Plan:   Diagnoses and all orders for this visit:    1. HTN, goal below 140/90  -     amLODIPine (NORVASC) 5 mg tablet; Take 1 Tab by mouth daily. 2. Diuretic-induced hypokalemia  -     potassium chloride (K-DUR, KLOR-CON) 20 mEq tablet; Take 2 Tabs by mouth daily for 14 days.  -     POTASSIUM        PMH reviewed per orders. Meds refilled for chronic conditions per orders. Your potasium is low and this may be side effect of your blood pressure medicine called chlorthalidone. STOP taking chlorthalidone and START taking amlodipine AND potassium pill. Return to clinic in 1 week for office visit to check blood pressure and recheck you potassium level. Educated patient on red flag symptoms to warrant return to clinic or emergency room visit. I have discussed the diagnosis with the patient and the intended plan as seen in the above orders. The patient has been offered or received an after-visit summary and questions were answered concerning future plans. I have discussed medication side effects and warnings with the patient as well. Follow-up and Dispositions    · Return in about 1 week (around 10/31/2019) for Follow Up blood pressure and low potassium.          Signed,    Otis Oakley MD  10/24/2019

## 2019-10-24 NOTE — PROGRESS NOTES
Given EKG finding of new inferior infarct, pt needs cardiac clearance prior to surgery. Pt also with low potassium and would like to see this back in the normal range before we proceed with surgery. She has seen her PCP and started on potassium supplement. Called pt, unavailable, but reviewed results with pt's  as she had previously given permission to release information to him and he is on HIPPA form. Advised that we will cancel surgery for Monday and reschedule in 2-4 weeks. In the meantime recommended appointment with cardiology for cardiac clearance prior to surgery. Lalo Steinberg, Please cancel surgery for Monday and ask Trung Best to reschedule in 2-4 weeks after she has been able to have appointment with cardiologist. Please place cardiology referral and help to get her an appointment within the next couple of weeks for cardiac clearance. Please then notify patient of this appointment and new surgery time. Thanks.

## 2019-10-24 NOTE — TELEPHONE ENCOUNTER
Patient has been informed regarding potassium. She will take all meds her PCP told her to take and follow directions of PAT for the meds the day of. She is aware that she may have to be delayed having surgery if low potassium.

## 2019-10-24 NOTE — PROGRESS NOTES
Chief Complaint   Patient presents with    Pre-op Exam     10/28/2019 Cervical Polypectomy Possible Hysteroscopy Dilation and Currettamaxim, Zaria Willis MD     1. Have you been to the ER, urgent care clinic since your last visit? Hospitalized since your last visit? NO    2. Have you seen or consulted any other health care providers outside of the [address] [place] [address] since your last visit? Include any pap smears or colon screening.  No

## 2019-10-25 DIAGNOSIS — R94.31 ABNORMAL EKG: Primary | ICD-10-CM

## 2019-10-25 DIAGNOSIS — Z01.810 PRE-OPERATIVE CARDIOVASCULAR EXAMINATION: ICD-10-CM

## 2019-10-30 ENCOUNTER — OFFICE VISIT (OUTPATIENT)
Dept: FAMILY MEDICINE CLINIC | Age: 44
End: 2019-10-30

## 2019-10-30 VITALS
WEIGHT: 140 LBS | OXYGEN SATURATION: 96 % | BODY MASS INDEX: 31.49 KG/M2 | TEMPERATURE: 98.5 F | SYSTOLIC BLOOD PRESSURE: 124 MMHG | HEIGHT: 56 IN | DIASTOLIC BLOOD PRESSURE: 78 MMHG | RESPIRATION RATE: 16 BRPM | HEART RATE: 98 BPM

## 2019-10-30 DIAGNOSIS — T50.2X5A DIURETIC-INDUCED HYPOKALEMIA: ICD-10-CM

## 2019-10-30 DIAGNOSIS — I10 HTN, GOAL BELOW 140/90: Primary | ICD-10-CM

## 2019-10-30 DIAGNOSIS — E87.6 DIURETIC-INDUCED HYPOKALEMIA: ICD-10-CM

## 2019-10-30 NOTE — PATIENT INSTRUCTIONS
DASH Diet: Care Instructions  Your Care Instructions    The DASH diet is an eating plan that can help lower your blood pressure. DASH stands for Dietary Approaches to Stop Hypertension. Hypertension is high blood pressure. The DASH diet focuses on eating foods that are high in calcium, potassium, and magnesium. These nutrients can lower blood pressure. The foods that are highest in these nutrients are fruits, vegetables, low-fat dairy products, nuts, seeds, and legumes. But taking calcium, potassium, and magnesium supplements instead of eating foods that are high in those nutrients does not have the same effect. The DASH diet also includes whole grains, fish, and poultry. The DASH diet is one of several lifestyle changes your doctor may recommend to lower your high blood pressure. Your doctor may also want you to decrease the amount of sodium in your diet. Lowering sodium while following the DASH diet can lower blood pressure even further than just the DASH diet alone. Follow-up care is a key part of your treatment and safety. Be sure to make and go to all appointments, and call your doctor if you are having problems. It's also a good idea to know your test results and keep a list of the medicines you take. How can you care for yourself at home? Following the DASH diet  · Eat 4 to 5 servings of fruit each day. A serving is 1 medium-sized piece of fruit, ½ cup chopped or canned fruit, 1/4 cup dried fruit, or 4 ounces (½ cup) of fruit juice. Choose fruit more often than fruit juice. · Eat 4 to 5 servings of vegetables each day. A serving is 1 cup of lettuce or raw leafy vegetables, ½ cup of chopped or cooked vegetables, or 4 ounces (½ cup) of vegetable juice. Choose vegetables more often than vegetable juice. · Get 2 to 3 servings of low-fat and fat-free dairy each day. A serving is 8 ounces of milk, 1 cup of yogurt, or 1 ½ ounces of cheese. · Eat 6 to 8 servings of grains each day.  A serving is 1 slice of bread, 1 ounce of dry cereal, or ½ cup of cooked rice, pasta, or cooked cereal. Try to choose whole-grain products as much as possible. · Limit lean meat, poultry, and fish to 2 servings each day. A serving is 3 ounces, about the size of a deck of cards. · Eat 4 to 5 servings of nuts, seeds, and legumes (cooked dried beans, lentils, and split peas) each week. A serving is 1/3 cup of nuts, 2 tablespoons of seeds, or ½ cup of cooked beans or peas. · Limit fats and oils to 2 to 3 servings each day. A serving is 1 teaspoon of vegetable oil or 2 tablespoons of salad dressing. · Limit sweets and added sugars to 5 servings or less a week. A serving is 1 tablespoon jelly or jam, ½ cup sorbet, or 1 cup of lemonade. · Eat less than 2,300 milligrams (mg) of sodium a day. If you limit your sodium to 1,500 mg a day, you can lower your blood pressure even more. Tips for success  · Start small. Do not try to make dramatic changes to your diet all at once. You might feel that you are missing out on your favorite foods and then be more likely to not follow the plan. Make small changes, and stick with them. Once those changes become habit, add a few more changes. · Try some of the following:  ? Make it a goal to eat a fruit or vegetable at every meal and at snacks. This will make it easy to get the recommended amount of fruits and vegetables each day. ? Try yogurt topped with fruit and nuts for a snack or healthy dessert. ? Add lettuce, tomato, cucumber, and onion to sandwiches. ? Combine a ready-made pizza crust with low-fat mozzarella cheese and lots of vegetable toppings. Try using tomatoes, squash, spinach, broccoli, carrots, cauliflower, and onions. ? Have a variety of cut-up vegetables with a low-fat dip as an appetizer instead of chips and dip. ? Sprinkle sunflower seeds or chopped almonds over salads. Or try adding chopped walnuts or almonds to cooked vegetables.   ? Try some vegetarian meals using beans and peas. Add garbanzo or kidney beans to salads. Make burritos and tacos with mashed alvares beans or black beans. Where can you learn more? Go to http://gómez-william.info/. Enter W751 in the search box to learn more about \"DASH Diet: Care Instructions. \"  Current as of: April 9, 2019  Content Version: 12.2  © 3316-5662 [employer]. Care instructions adapted under license by [employer] (which disclaims liability or warranty for this information). If you have questions about a medical condition or this instruction, always ask your healthcare professional. [address] any warranty or liability for your use of this information.

## 2019-10-30 NOTE — PROGRESS NOTES
[address] [facility] Note      Subjective:     Chief Complaint   Patient presents with    Labs     potassium follow up      Yary Gilman is a 45y.o. year old female who presents for evaluation of the following:      Diuretic Induced Hypokalemia:   Planned GYN surgery and found to have hypokalemia on preop testing.   - surgery has been cancelled for now  Endorses resolved muscle aching  Tx: potasium chloride 40meq daily x 2 weeks  Previous chlorthalidone use saurabh Josue K: 3.7 (4/2018) > 3.1 (4/2019)> 2.7 (10/23/19)  Denies chest pain, palpitations, weakness, change in medicaitons  Lab Results   Component Value Date/Time    Potassium 2.7 (LL) 10/23/2019 02:00 PM       HTN:   Tx: amlodipine  Previous tx Chlorthalidone (hypokalemia)  No home monitoring  BP Readings from Last 3 Encounters:   10/30/19 124/78   10/24/19 125/86   10/23/19 114/77         Review of Systems   Pertinent positives and negative per HPI. All other systems  reviewed are negative for a Comprehensive ROS (10+).        Past Medical History:   Diagnosis Date    Brain lesion 3/24/2017    HTN, goal below 140/90 3/8/2018    Hypokalemia 10/24/2019    Immune to varicella     serology confirmed 6/24/14    LTBI (latent tuberculosis infection) 3/24/2017    Migraine without status migrainosus, not intractable 3/24/2017        Social History     Socioeconomic History    Marital status:      Spouse name: Not on file    Number of children: Not on file    Years of education: Not on file    Highest education level: Not on file   Occupational History    Not on file   Social Needs    Financial resource strain: Not on file    Food insecurity:     Worry: Not on file     Inability: Not on file    Transportation needs:     Medical: Not on file     Non-medical: Not on file   Tobacco Use    Smoking status: Never Smoker    Smokeless tobacco: Never Used   Substance and Sexual Activity    Alcohol use: Never     Frequency: Never    Drug use: No    Sexual activity: Yes     Partners: Male     Birth control/protection: None   Lifestyle    Physical activity:     Days per week: Not on file     Minutes per session: Not on file    Stress: Not on file   Relationships    Social connections:     Talks on phone: Not on file     Gets together: Not on file     Attends Confucianist service: Not on file     Active member of club or organization: Not on file     Attends meetings of clubs or organizations: Not on file     Relationship status: Not on file    Intimate partner violence:     Fear of current or ex partner: Not on file     Emotionally abused: Not on file     Physically abused: Not on file     Forced sexual activity: Not on file   Other Topics Concern    Not on file   Social History Narrative    Not on file       Family History   Problem Relation Age of Onset    Diabetes Mother     Hypertension Mother     Stroke Father     Hypertension Father     No Known Problems Son     Anesth Problems Neg Hx        Current Outpatient Medications   Medication Sig    potassium chloride (K-DUR, KLOR-CON) 20 mEq tablet Take 2 Tabs by mouth daily for 14 days.  amLODIPine (NORVASC) 5 mg tablet Take 1 Tab by mouth daily.  medroxyPROGESTERone (PROVERA) 10 mg tablet Take 10 mg by mouth daily. Take 1 tab for 10 days, then stop. Will take for a few months. PT TAKES THIS THE 1ST-10TH OF EACH MONTH    multivitamin (ONE A DAY) tablet Take 1 Tab by mouth daily.  ibuprofen (ADVIL) 200 mg tablet Take 3-4 Tabs by mouth every six (6) hours as needed for Pain.  acetaminophen (TYLENOL EXTRA STRENGTH) 500 mg tablet Take 2 Tabs by mouth every six (6) hours as needed for Pain. No current facility-administered medications for this visit.               Objective:     Vitals:    10/30/19 1325   BP: 124/78   Pulse: 98   Resp: 16   Temp: 98.5 °F (36.9 °C)   TempSrc: Oral   SpO2: 96%   Weight: 140 lb (63.5 kg)   Height: 4' 7.5\" (1.41 m)       Physical Examination:  General: Alert, cooperative, no distress, appears stated age. Eyes: Conjunctivae clear. PERRL, EOMs intact. Ears: Normal external ear canals both ears. Nose: Nares normal.   Mouth/Throat: Lips, mucosa, and tongue normal.   Neck: Supple, symmetrical, trachea midline, no adenopathy. No thyroid enlargement/tenderness/nodules  Respiratory: Breathing comfortably, in no acute respiratory distress. Clear to auscultation bilaterally. Normal inspiratory and expiratory ratio. Cardiovascular: Regular rate and rhythm, S1, S2 normal, no murmur, click, rub or gallop.   -Extremities no edema. Pulses 2+ and symmetric radial  Abdomen: Soft, non-tender, not distended. Bowel sounds normal.   MSK: Extremities normal, atraumatic, no effusion. Gait steady and unassisted. Skin: Skin color, texture, turgor normal. No rashes or lesions on exposed skin. Lymph nodes: Cervical, supraclavicular nodes normal.  Neurologic: CNII-XII intact.    Psychiatric: Affect appropriate    Hospital Outpatient Visit on 10/23/2019   Component Date Value Ref Range Status    Ventricular Rate 10/23/2019 76  BPM Final    Atrial Rate 10/23/2019 76  BPM Final    P-R Interval 10/23/2019 148  ms Final    QRS Duration 10/23/2019 82  ms Final    Q-T Interval 10/23/2019 390  ms Final    QTC Calculation (Bezet) 10/23/2019 438  ms Final    Calculated P Axis 10/23/2019 34  degrees Final    Calculated R Axis 10/23/2019 16  degrees Final    Calculated T Axis 10/23/2019 66  degrees Final    Diagnosis 10/23/2019    Final                    Value:Normal sinus rhythm  Inferior infarct , age undetermined  Poor R-wave Progression (consider lead placement or loss of anterior forces)  Abnormal ECG  When compared with ECG of 27-AUG-2014 12:03,  Inferior infarct is now present  Confirmed by Macrina Byrnes M.D., Lizzie Garrido ([id number]) on 10/23/2019 2:17:44 PM      Sodium 10/23/2019 139  136 - 145 mmol/L Final    Potassium 10/23/2019 2.7* 3.5 - 5.1 mmol/L Final Comment: RESULTS VERIFIED, PHONED TO AND READ BACK BY  PALMER FRANK @ 1609 / TSP      Chloride 10/23/2019 102  97 - 108 mmol/L Final    CO2 10/23/2019 30  21 - 32 mmol/L Final    Anion gap 10/23/2019 7  5 - 15 mmol/L Final    Glucose 10/23/2019 143* 65 - 100 mg/dL Final    BUN 10/23/2019 9  6 - 20 MG/DL Final    Creatinine 10/23/2019 0.58  0.55 - 1.02 MG/DL Final    BUN/Creatinine ratio 10/23/2019 16  12 - 20   Final    GFR est AA 10/23/2019 >60  >60 ml/min/1.73m2 Final    GFR est non-AA 10/23/2019 >60  >60 ml/min/1.73m2 Final    Comment: Estimated GFR is calculated using the IDMS-traceable Modification of Diet in Renal Disease (MDRD) Study equation, reported for both  Americans (GFRAA) and non- Americans (GFRNA), and normalized to 1.73m2 body surface area. The physician must decide which value applies to the patient. The MDRD study equation should only be used in individuals age 25 or older. It has not been validated for the following: pregnant women, patients with serious comorbid conditions, or on certain medications, or persons with extremes of body size, muscle mass, or nutritional status.  Calcium 10/23/2019 9.0  8.5 - 10.1 MG/DL Final    Crossmatch Expiration 10/23/2019 10/31/2019   Final    ABO/Rh(D) 10/23/2019 A POSITIVE   Final    Antibody screen 10/23/2019 NEG   Final           Assessment/ Plan:   Diagnoses and all orders for this visit:    1. HTN, goal below 140/90    2. Diuretic-induced hypokalemia      HTN well controlled on changed medication. Continue CCB only for now given diuretic induced hypokalemia. Recheck K level today, if normal will discontinue potassium suppeentation and recheck in 2 weeks. Patient states she was told to go see a cardiology and asks for a name of local cardiologist. Name of local cards practice provide but no clear reason for referral so none place.  Advised patient follow up with GYN about reason she needs to see cardiologist.        Gary Masters patient on red flag symptoms to warrant return to clinic or emergency room visit. I have discussed the diagnosis with the patient and the intended plan as seen in the above orders. The patient has been offered or received an after-visit summary and questions were answered concerning future plans. I have discussed medication side effects and warnings with the patient as well. Follow-up and Dispositions    · Return if symptoms worsen or fail to improve.          Signed,    Angelina Robbins MD  10/30/2019

## 2019-10-30 NOTE — PROGRESS NOTES
Chief Complaint   Patient presents with    Labs     potassium follow up      1. Have you been to the ER, urgent care clinic since your last visit? Hospitalized since your last visit? No    2. Have you seen or consulted any other health care providers outside of the [address] [place] [address] since your last visit? Include any pap smears or colon screening.  No

## 2019-10-31 LAB — POTASSIUM SERPL-SCNC: 3.7 MMOL/L (ref 3.5–5.2)

## 2019-11-01 NOTE — PROGRESS NOTES
Call placed to patient. No answer per patients request leave detailed message on voicemail with results. Message left with provider recommendation. advised patient to call office with questions

## 2019-11-01 NOTE — PROGRESS NOTES
Notify Patient:  Your potassium level is normal. You may stop taking the potassium supplement and continue taking the new blood pressure medicine called amlodipine. Do not take the HCTZ. Follow up in 1 month to see you PCP for further monitoring. She may want another potassium level at that time.

## 2019-11-11 ENCOUNTER — OFFICE VISIT (OUTPATIENT)
Dept: CARDIOLOGY CLINIC | Age: 44
End: 2019-11-11

## 2019-11-11 VITALS
OXYGEN SATURATION: 98 % | RESPIRATION RATE: 16 BRPM | SYSTOLIC BLOOD PRESSURE: 116 MMHG | HEART RATE: 85 BPM | HEIGHT: 56 IN | BODY MASS INDEX: 31.31 KG/M2 | DIASTOLIC BLOOD PRESSURE: 78 MMHG | WEIGHT: 139.2 LBS

## 2019-11-11 DIAGNOSIS — R94.31 ABNORMAL EKG: Primary | ICD-10-CM

## 2019-11-11 DIAGNOSIS — Z01.810 PREOP CARDIOVASCULAR EXAM: ICD-10-CM

## 2019-11-11 DIAGNOSIS — I10 HTN, GOAL BELOW 140/90: ICD-10-CM

## 2019-11-11 NOTE — PROGRESS NOTES
HISTORY OF PRESENT ILLNESS  Sonia Mejias is a 45 y.o. female     SUMMARY:   Problem List  Date Reviewed: 11/11/2019          Codes Class Noted    Hypokalemia ICD-10-CM: E87.6  ICD-9-CM: 276.8  10/24/2019        HTN, goal below 140/90 ICD-10-CM: I10  ICD-9-CM: 401.9  3/8/2018        Immune to varicella ICD-10-CM: Z78.9  ICD-9-CM: V49.89  Unknown    Overview Signed 12/5/2017  4:46 PM by Alexandra Aranda MD     serology confirmed 6/24/14             Brain lesion ICD-10-CM: G93.9  ICD-9-CM: 348.89  3/24/2017        Migraine without status migrainosus, not intractable ICD-10-CM: T70.611  ICD-9-CM: 346.90  3/24/2017        LTBI (latent tuberculosis infection) ICD-10-CM: Z22.7  ICD-9-CM: 795.51  3/24/2017              Current Outpatient Medications on File Prior to Visit   Medication Sig    amLODIPine (NORVASC) 5 mg tablet Take 1 Tab by mouth daily.  multivitamin (ONE A DAY) tablet Take 1 Tab by mouth daily.  ibuprofen (ADVIL) 200 mg tablet Take 3-4 Tabs by mouth every six (6) hours as needed for Pain.  acetaminophen (TYLENOL EXTRA STRENGTH) 500 mg tablet Take 2 Tabs by mouth every six (6) hours as needed for Pain.  medroxyPROGESTERone (PROVERA) 10 mg tablet Take 10 mg by mouth daily. Take 1 tab for 10 days, then stop. Will take for a few months. PT TAKES THIS THE 1ST-10TH OF EACH MONTH     No current facility-administered medications on file prior to visit. CARDIOLOGY STUDIES TO DATE:  No specialty comments available. Chief Complaint   Patient presents with    New Patient     HPI :  Ms. Renea Jack is a 45year-old referred by her OB-GYN for preop evaluation because of an abnormal EKG. Her current tracing shows Q-wave in lead III and a small Q-wave in aVF, which is different from her tracing in 2014. She needs to have some sort of cervical biopsy or limited procedure. She has hypertension, prediabetes and a recent LDL cholesterol was 119. She has never smoked.   Family history is negative for premature coronary disease. 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. She walks for 30 minutes a day at least four days a week and maintains her own home and does her own cleaning, cooking, shopping and laundry with no symptoms suggestive of angina or heart failure. She has occasional heartburn with greasy type foods. CARDIAC ROS:   negative for chest pain, dyspnea, palpitations, syncope, orthopnea, paroxysmal nocturnal dyspnea, exertional chest pressure/discomfort, claudication, lower extremity edema    Family History   Problem Relation Age of Onset    Diabetes Mother     Hypertension Mother     Stroke Father     Hypertension Father     No Known Problems Son     Anesth Problems Neg Hx        Past Medical History:   Diagnosis Date    Brain lesion 3/24/2017    HTN, goal below 140/90 3/8/2018    Hypokalemia 10/24/2019    Immune to varicella     serology confirmed 6/24/14    LTBI (latent tuberculosis infection) 3/24/2017    Migraine without status migrainosus, not intractable 3/24/2017       GENERAL ROS:  A comprehensive review of systems was negative except for that written in the HPI.     Visit Vitals  /78 (BP 1 Location: Left arm, BP Patient Position: Sitting)   Pulse 85   Resp 16   Ht 4' 7.5\" (1.41 m)   Wt 139 lb 3.2 oz (63.1 kg)   LMP 10/30/2019 (Exact Date)   SpO2 98%   BMI 31.77 kg/m²       Wt Readings from Last 3 Encounters:   11/11/19 139 lb 3.2 oz (63.1 kg)   10/30/19 140 lb (63.5 kg)   10/24/19 136 lb 9.6 oz (62 kg)            BP Readings from Last 3 Encounters:   11/11/19 116/78   10/30/19 124/78   10/24/19 125/86       PHYSICAL EXAM  General appearance: alert, cooperative, no distress, appears stated age  Neurologic: Alert and oriented X 3  Neck: supple, symmetrical, trachea midline, no adenopathy, no carotid bruit and no JVD  Lungs: clear to auscultation bilaterally  Heart: regular rate and rhythm, S1, S2 normal, no murmur, click, rub or gallop  Abdomen: soft, non-tender. Bowel sounds normal. No masses,  no organomegaly  Extremities: extremities normal, atraumatic, no cyanosis or edema  Pulses: 2+ and symmetric    Lab Results   Component Value Date/Time    Cholesterol, total 183 04/03/2019 09:09 AM    Cholesterol, total 191 04/16/2018 08:42 AM    Cholesterol, total 167 03/25/2017 09:13 AM    Cholesterol, total 168 08/25/2015 02:55 PM    Cholesterol, total 166 03/17/2015 09:41 AM    HDL Cholesterol 44 04/03/2019 09:09 AM    HDL Cholesterol 50 04/16/2018 08:42 AM    HDL Cholesterol 50 03/25/2017 09:13 AM    HDL Cholesterol 38 (L) 08/25/2015 02:55 PM    HDL Cholesterol 45 03/17/2015 09:41 AM    LDL, calculated 119 (H) 04/03/2019 09:09 AM    LDL, calculated 126 (H) 04/16/2018 08:42 AM    LDL, calculated 101 (H) 03/25/2017 09:13 AM    LDL, calculated 99 08/25/2015 02:55 PM    LDL, calculated 108 (H) 03/17/2015 09:41 AM    Triglyceride 102 04/03/2019 09:09 AM    Triglyceride 76 04/16/2018 08:42 AM    Triglyceride 82 03/25/2017 09:13 AM    Triglyceride 153 (H) 08/25/2015 02:55 PM    Triglyceride 66 03/17/2015 09:41 AM     ASSESSMENT  Ms. Anderson's EKG is abnormal and slightly different. She has modest coronary risk factors, which are well controlled and she has no worrisome symptoms. In addition, she is facing low risk surgery from a cardiac standpoint. I am going to get an echocardiogram on her just to make sure there is nothing abnormal there, but assuming that looks okay, surgery should be able to proceed without special precautions or further cardiac testing. current treatment plan is effective, no change in therapy  lab results and schedule of future lab studies reviewed with patient  reviewed diet, exercise and weight control    Encounter Diagnoses   Name Primary?  Abnormal EKG Yes    HTN, goal below 140/90     Preop cardiovascular exam      No orders of the defined types were placed in this encounter.       Follow-up and Dispositions    · Return if symptoms worsen or fail to improve.          Carin Bowden MD  11/11/2019

## 2019-11-22 ENCOUNTER — TELEPHONE (OUTPATIENT)
Dept: FAMILY MEDICINE CLINIC | Age: 44
End: 2019-11-22

## 2019-11-22 DIAGNOSIS — Z11.1 SCREENING-PULMONARY TB: Primary | ICD-10-CM

## 2019-11-22 NOTE — TELEPHONE ENCOUNTER
Saw pt's  today as my patient. She needs to have TB testing done and forms signed. She may come by to lab to do TB testing and I will hold onto her forms until results return.

## 2019-11-25 ENCOUNTER — TELEPHONE (OUTPATIENT)
Dept: CARDIOLOGY CLINIC | Age: 44
End: 2019-11-25

## 2019-11-25 NOTE — TELEPHONE ENCOUNTER
Called patient. Mailbox was full so unable to leave message. Will try calling patient back. Called patient. Verified patient's identity with two identifiers. Notified patient of results. Lang Funez (who did patient's echo) was sending report to doctor needing clearance. Patient verbalized understanding and denied further questions or concerns.

## 2019-11-25 NOTE — TELEPHONE ENCOUNTER
From: Michelle Zimmer MD   Sent: 11/25/2019  11:53 AM EST   To: Juan Copeland MD, Fiona Douglas, RN     Please let this pt of Dr. Caio Milian know echo was normal. thx

## 2019-11-27 LAB
GAMMA INTERFERON BACKGROUND BLD IA-ACNC: 0.2 IU/ML
M TB IFN-G BLD-IMP: POSITIVE
M TB IFN-G CD4+ BCKGRND COR BLD-ACNC: 2.31 IU/ML
MITOGEN IGNF BLD-ACNC: >10 IU/ML
QUANTIFERON INCUBATION, QF1T: ABNORMAL
QUANTIFERON TB2 AG: 2.82 IU/ML
SERVICE CMNT-IMP: ABNORMAL

## 2019-11-29 NOTE — TELEPHONE ENCOUNTER
She is positive, however has a history of latent TB which has been treated and a negative chest xray in past.  Please write note to work explaining this.

## 2019-12-04 NOTE — TELEPHONE ENCOUNTER
Outbound call to patient at [phone]. Left message that result notes will be printed and placed in the mail. Letter generated for patient and faxed to patients job related to TB results.

## 2019-12-09 DIAGNOSIS — N84.1 CERVICAL POLYP: Primary | ICD-10-CM

## 2019-12-20 NOTE — PERIOP NOTES
Preop phone call completed. Preop instructions and preop medications reveiwed with patient. Pt instructed to Purchase 2  4 oz bottles of CHG soap and instructed in use.

## 2019-12-21 ENCOUNTER — ANESTHESIA EVENT (OUTPATIENT)
Dept: SURGERY | Age: 44
End: 2019-12-21
Payer: COMMERCIAL

## 2019-12-23 ENCOUNTER — ANESTHESIA (OUTPATIENT)
Dept: SURGERY | Age: 44
End: 2019-12-23
Payer: COMMERCIAL

## 2019-12-23 ENCOUNTER — HOSPITAL ENCOUNTER (OUTPATIENT)
Age: 44
Setting detail: OUTPATIENT SURGERY
Discharge: HOME | End: 2019-12-23
Attending: OBSTETRICS & GYNECOLOGY | Admitting: OBSTETRICS & GYNECOLOGY
Payer: COMMERCIAL

## 2019-12-23 VITALS
HEART RATE: 82 BPM | DIASTOLIC BLOOD PRESSURE: 72 MMHG | OXYGEN SATURATION: 97 % | HEIGHT: 57 IN | RESPIRATION RATE: 16 BRPM | TEMPERATURE: 97.5 F | BODY MASS INDEX: 30.2 KG/M2 | SYSTOLIC BLOOD PRESSURE: 101 MMHG | WEIGHT: 140 LBS

## 2019-12-23 DIAGNOSIS — N84.1 CERVICAL POLYP: ICD-10-CM

## 2019-12-23 LAB
ANION GAP BLD CALC-SCNC: 17 MMOL/L (ref 10–20)
BUN BLD-MCNC: 9 MG/DL (ref 9–20)
CA-I BLD-MCNC: 1.2 MMOL/L (ref 1.12–1.32)
CHLORIDE BLD-SCNC: 104 MMOL/L (ref 98–107)
CO2 BLD-SCNC: 24 MMOL/L (ref 21–32)
CREAT BLD-MCNC: 0.4 MG/DL (ref 0.6–1.3)
GLUCOSE BLD-MCNC: 119 MG/DL (ref 65–100)
HCG UR QL: NEGATIVE
HCT VFR BLD CALC: 41 % (ref 35–47)
POTASSIUM BLD-SCNC: 3.1 MMOL/L (ref 3.5–5.1)
SERVICE CMNT-IMP: ABNORMAL
SODIUM BLD-SCNC: 141 MMOL/L (ref 136–145)

## 2019-12-23 PROCEDURE — 36415 COLL VENOUS BLD VENIPUNCTURE: CPT

## 2019-12-23 PROCEDURE — 76210000021 HC REC RM PH II 0.5 TO 1 HR: Performed by: OBSTETRICS & GYNECOLOGY

## 2019-12-23 PROCEDURE — 74011000250 HC RX REV CODE- 250: Performed by: NURSE ANESTHETIST, CERTIFIED REGISTERED

## 2019-12-23 PROCEDURE — 74011250636 HC RX REV CODE- 250/636: Performed by: ANESTHESIOLOGY

## 2019-12-23 PROCEDURE — 74011000258 HC RX REV CODE- 258: Performed by: NURSE ANESTHETIST, CERTIFIED REGISTERED

## 2019-12-23 PROCEDURE — 77030040922 HC BLNKT HYPOTHRM STRY -A

## 2019-12-23 PROCEDURE — 77030040361 HC SLV COMPR DVT MDII -B: Performed by: OBSTETRICS & GYNECOLOGY

## 2019-12-23 PROCEDURE — 74011250636 HC RX REV CODE- 250/636: Performed by: NURSE ANESTHETIST, CERTIFIED REGISTERED

## 2019-12-23 PROCEDURE — 74011000250 HC RX REV CODE- 250: Performed by: OBSTETRICS & GYNECOLOGY

## 2019-12-23 PROCEDURE — 88305 TISSUE EXAM BY PATHOLOGIST: CPT

## 2019-12-23 PROCEDURE — 76010000138 HC OR TIME 0.5 TO 1 HR: Performed by: OBSTETRICS & GYNECOLOGY

## 2019-12-23 PROCEDURE — 76060000032 HC ANESTHESIA 0.5 TO 1 HR: Performed by: OBSTETRICS & GYNECOLOGY

## 2019-12-23 PROCEDURE — 80047 BASIC METABLC PNL IONIZED CA: CPT

## 2019-12-23 PROCEDURE — 76210000006 HC OR PH I REC 0.5 TO 1 HR: Performed by: OBSTETRICS & GYNECOLOGY

## 2019-12-23 PROCEDURE — 81025 URINE PREGNANCY TEST: CPT

## 2019-12-23 RX ORDER — SODIUM CHLORIDE 0.9 % (FLUSH) 0.9 %
5-40 SYRINGE (ML) INJECTION EVERY 8 HOURS
Status: DISCONTINUED | OUTPATIENT
Start: 2019-12-23 | End: 2019-12-23 | Stop reason: HOSPADM

## 2019-12-23 RX ORDER — SODIUM CHLORIDE 0.9 % (FLUSH) 0.9 %
5-40 SYRINGE (ML) INJECTION AS NEEDED
Status: DISCONTINUED | OUTPATIENT
Start: 2019-12-23 | End: 2019-12-23 | Stop reason: HOSPADM

## 2019-12-23 RX ORDER — FENTANYL CITRATE 50 UG/ML
25 INJECTION, SOLUTION INTRAMUSCULAR; INTRAVENOUS
Status: DISCONTINUED | OUTPATIENT
Start: 2019-12-23 | End: 2019-12-23 | Stop reason: HOSPADM

## 2019-12-23 RX ORDER — KETOROLAC TROMETHAMINE 30 MG/ML
INJECTION, SOLUTION INTRAMUSCULAR; INTRAVENOUS AS NEEDED
Status: DISCONTINUED | OUTPATIENT
Start: 2019-12-23 | End: 2019-12-23 | Stop reason: HOSPADM

## 2019-12-23 RX ORDER — EPHEDRINE SULFATE/0.9% NACL/PF 50 MG/5 ML
SYRINGE (ML) INTRAVENOUS
Status: DISCONTINUED
Start: 2019-12-23 | End: 2019-12-23 | Stop reason: HOSPADM

## 2019-12-23 RX ORDER — FERRIC SUBSULFATE 20-22G/100
SOLUTION, NON-ORAL MISCELLANEOUS AS NEEDED
Status: DISCONTINUED | OUTPATIENT
Start: 2019-12-23 | End: 2019-12-23 | Stop reason: HOSPADM

## 2019-12-23 RX ORDER — SODIUM CHLORIDE, SODIUM LACTATE, POTASSIUM CHLORIDE, CALCIUM CHLORIDE 600; 310; 30; 20 MG/100ML; MG/100ML; MG/100ML; MG/100ML
50 INJECTION, SOLUTION INTRAVENOUS CONTINUOUS
Status: DISCONTINUED | OUTPATIENT
Start: 2019-12-23 | End: 2019-12-23 | Stop reason: HOSPADM

## 2019-12-23 RX ORDER — FENTANYL CITRATE 50 UG/ML
INJECTION, SOLUTION INTRAMUSCULAR; INTRAVENOUS AS NEEDED
Status: DISCONTINUED | OUTPATIENT
Start: 2019-12-23 | End: 2019-12-23 | Stop reason: HOSPADM

## 2019-12-23 RX ORDER — ONDANSETRON 2 MG/ML
4 INJECTION INTRAMUSCULAR; INTRAVENOUS AS NEEDED
Status: DISCONTINUED | OUTPATIENT
Start: 2019-12-23 | End: 2019-12-23 | Stop reason: HOSPADM

## 2019-12-23 RX ORDER — MORPHINE SULFATE 10 MG/ML
2 INJECTION, SOLUTION INTRAMUSCULAR; INTRAVENOUS
Status: DISCONTINUED | OUTPATIENT
Start: 2019-12-23 | End: 2019-12-23 | Stop reason: HOSPADM

## 2019-12-23 RX ORDER — MIDAZOLAM HYDROCHLORIDE 1 MG/ML
INJECTION, SOLUTION INTRAMUSCULAR; INTRAVENOUS AS NEEDED
Status: DISCONTINUED | OUTPATIENT
Start: 2019-12-23 | End: 2019-12-23 | Stop reason: HOSPADM

## 2019-12-23 RX ORDER — ONDANSETRON 2 MG/ML
INJECTION INTRAMUSCULAR; INTRAVENOUS AS NEEDED
Status: DISCONTINUED | OUTPATIENT
Start: 2019-12-23 | End: 2019-12-23 | Stop reason: HOSPADM

## 2019-12-23 RX ORDER — HYDROMORPHONE HYDROCHLORIDE 1 MG/ML
0.2 INJECTION, SOLUTION INTRAMUSCULAR; INTRAVENOUS; SUBCUTANEOUS
Status: DISCONTINUED | OUTPATIENT
Start: 2019-12-23 | End: 2019-12-23 | Stop reason: HOSPADM

## 2019-12-23 RX ORDER — LIDOCAINE HYDROCHLORIDE 10 MG/ML
0.1 INJECTION, SOLUTION EPIDURAL; INFILTRATION; INTRACAUDAL; PERINEURAL AS NEEDED
Status: DISCONTINUED | OUTPATIENT
Start: 2019-12-23 | End: 2019-12-23 | Stop reason: HOSPADM

## 2019-12-23 RX ORDER — PROPOFOL 10 MG/ML
INJECTION, EMULSION INTRAVENOUS
Status: DISCONTINUED | OUTPATIENT
Start: 2019-12-23 | End: 2019-12-23 | Stop reason: HOSPADM

## 2019-12-23 RX ADMIN — KETOROLAC TROMETHAMINE 30 MG: 30 INJECTION, SOLUTION INTRAMUSCULAR; INTRAVENOUS at 07:49

## 2019-12-23 RX ADMIN — PROPOFOL 125 MCG/KG/MIN: 10 INJECTION, EMULSION INTRAVENOUS at 07:38

## 2019-12-23 RX ADMIN — FENTANYL CITRATE 50 MCG: 50 INJECTION, SOLUTION INTRAMUSCULAR; INTRAVENOUS at 07:38

## 2019-12-23 RX ADMIN — MIDAZOLAM 2 MG: 1 INJECTION INTRAMUSCULAR; INTRAVENOUS at 07:33

## 2019-12-23 RX ADMIN — SODIUM CHLORIDE, SODIUM LACTATE, POTASSIUM CHLORIDE, AND CALCIUM CHLORIDE 50 ML/HR: 600; 310; 30; 20 INJECTION, SOLUTION INTRAVENOUS at 06:36

## 2019-12-23 RX ADMIN — DEXMEDETOMIDINE HYDROCHLORIDE 4 MCG: 100 INJECTION, SOLUTION, CONCENTRATE INTRAVENOUS at 07:41

## 2019-12-23 RX ADMIN — FENTANYL CITRATE 25 MCG: 50 INJECTION, SOLUTION INTRAMUSCULAR; INTRAVENOUS at 07:49

## 2019-12-23 RX ADMIN — ONDANSETRON HYDROCHLORIDE 4 MG: 2 INJECTION, SOLUTION INTRAMUSCULAR; INTRAVENOUS at 07:49

## 2019-12-23 RX ADMIN — DEXMEDETOMIDINE HYDROCHLORIDE 4 MCG: 100 INJECTION, SOLUTION, CONCENTRATE INTRAVENOUS at 07:38

## 2019-12-23 NOTE — OP NOTES
Cervical polypectomy:    -Preop diagnosis: AUB, cervical polyp  -Postop diagnosis: AUB, cervical polyp  -Procedure: cervical polypectomy  -Indication: 44 yo with AUB and 2-3cm prolapsing cervical polyp  -Surgeon: Jose Luis Zavala MD  -Assistant: none  -Anesthesia: MAC  -Complications: none  -EBL: <5cc  -IVF: per anesthesia  -UOP: voided prior to procedure  -Implants: none  -Meds: none  -Specimen: cervical polyp  -Findings: 2-3cm prolapsing cervical polyp, bimanual exam with small mobile uterus    Description of procedure: Pt was taken to the operating room, was placed on the operating room table, and was given anesthesia (as per separate documentation). Pt had her legs placed in stirrups, and was prepped and draped in a sterile fashion. A time out was performed. A sterile speculum was inserted into the vagina. The cervix was visualized with prolapsing polyp noted above. Polyp was grasped with ringed forceps, and removed with traction with rotation. Monsels and pressure were applied to biopsy site for hemostasis. The specimen was sent to pathology. All instruments were removed from vagina. Hemostasis was noted to be excellent. The patient then had her legs taken out of stirrups. The patient tolerated the procedure well, was extubated, and was taken to the recovery room in stable condition. There were no complications and pathology is pending.      Emma Gonzales MD  12/23/2019  8:07 AM

## 2019-12-23 NOTE — LETTER
12/23/2019 8:36 AM 
 
Ms. Sonia Mejias [address] [phone] To whom it may concern, 
 
Ms. Renea Jack was admitted to hospital for a procedure on 12/23/19. She should be excused from work on that day, but may return the following day. Sincerely, Grace Contreras MD

## 2019-12-23 NOTE — ANESTHESIA POSTPROCEDURE EVALUATION
Post-Anesthesia Evaluation and Assessment    Patient: Juan Carlos Ghotra MRN: [id number]  SSN: [id number]    YOB: 1975  Age: 45 y.o. Sex: female      I have evaluated the patient and they are stable and ready for discharge from the PACU. Cardiovascular Function/Vital Signs  Visit Vitals  BP 95/63   Pulse 93   Temp 36.4 °C (97.5 °F)   Resp 15   Ht 4' 9\" (1.448 m)   Wt 63.5 kg (140 lb)   SpO2 100%   BMI 30.30 kg/m²       Patient is status post MAC anesthesia for Procedure(s):  CERVICAL POLYPECTOMY. Nausea/Vomiting: None    Postoperative hydration reviewed and adequate. Pain:  Pain Scale 1: Numeric (0 - 10) (12/23/19 0809)  Pain Intensity 1: 0 (12/23/19 0809)   Managed    Neurological Status:   Neuro (WDL): Exceptions to WDL (12/23/19 0809)  Neuro  Neurologic State: Drowsy; Eyes open to stimulus (12/23/19 0809)  Orientation Level: Oriented to person;Oriented to place;Oriented to situation;Disoriented to time (12/23/19 0809)  Cognition: Appropriate for age attention/concentration; Appropriate safety awareness; Follows commands (12/23/19 0809)  Speech: Appropriate for age;Clear;Nods appropriately (12/23/19 0809)  LUE Motor Response: Purposeful (12/23/19 0809)  LLE Motor Response: Purposeful (12/23/19 0809)  RUE Motor Response: Purposeful (12/23/19 0809)  RLE Motor Response: Purposeful (12/23/19 0809)   At baseline    Mental Status, Level of Consciousness: Alert and  oriented to person, place, and time    Pulmonary Status:   O2 Device: CO2 nasal cannula (12/23/19 0817)   Adequate oxygenation and airway patent    Complications related to anesthesia: None    Post-anesthesia assessment completed.  No concerns    Signed By: Fátima Mcfarland MD     December 23, 2019

## 2019-12-23 NOTE — ANESTHESIA PREPROCEDURE EVALUATION
Anesthetic History   No history of anesthetic complications            Review of Systems / Medical History  Patient summary reviewed, nursing notes reviewed and pertinent labs reviewed    Pulmonary  Within defined limits                 Neuro/Psych   Within defined limits           Cardiovascular    Hypertension              Exercise tolerance: >4 METS     GI/Hepatic/Renal     GERD           Endo/Other  Within defined limits           Other Findings              Physical Exam    Airway  Mallampati: II  TM Distance: 4 - 6 cm  Neck ROM: normal range of motion   Mouth opening: Normal     Cardiovascular    Rhythm: regular  Rate: normal         Dental  No notable dental hx       Pulmonary  Breath sounds clear to auscultation               Abdominal  Abdominal exam normal       Other Findings            Anesthetic Plan    ASA: 2  Anesthesia type: MAC and general - backup          Induction: Intravenous  Anesthetic plan and risks discussed with: Patient

## 2019-12-23 NOTE — PERIOP NOTES
Patient: Leander Jackson MRN: [id number]  SSN: [id number]   YOB: 1975  Age: 45 y.o. Sex: female     Patient is status post Procedure(s):  CERVICAL POLYPECTOMY.     Surgeon(s) and Role:     Spring Chavez MD - Primary    Local/Dose/Irrigation: N/A                  Peripheral IV 12/23/19 Left Hand (Active)                           Dressing/Packing:  Wound Vagina NO OPEN INCISION-Dressing Type: Perla-pad (12/23/19 0758)    Splint/Cast:  ]

## 2019-12-23 NOTE — DISCHARGE SUMMARY
Gynecology Discharge Summary     Patient ID:  Juan Carlos Ghotra  [id number]  66 y.o.  1975    Admit date: 12/23/2019    Discharge date and time: No discharge date for patient encounter. Admission Diagnoses:   cervical polyp  Patient Active Problem List   Diagnosis Code    Brain lesion G93.9    Migraine without status migrainosus, not intractable G43.909    LTBI (latent tuberculosis infection) Z22.7    Immune to varicella Z78.9    HTN, goal below 140/90 I10    Hypokalemia E87.6       Discharge Diagnoses: cervical polyp    Patient Active Problem List   Diagnosis Code    Brain lesion G93.9    Migraine without status migrainosus, not intractable G43.909    LTBI (latent tuberculosis infection) Z22.7    Immune to varicella Z78.9    HTN, goal below 140/90 I10    Hypokalemia E87.6       Procedures for this admission: Procedure(s):  [address] Course: Pt admitted for cervical polypectomy, procedure uncomplicated, pathology pending. Pt stable for discharge home same day of procedure. Disposition: Home or self care    Discharged Condition: stable      Patient Instructions:   Current Discharge Medication List      CONTINUE these medications which have NOT CHANGED    Details   amLODIPine (NORVASC) 5 mg tablet Take 1 Tab by mouth daily. Qty: 90 Tab, Refills: 1    Associated Diagnoses: HTN, goal below 140/90      multivitamin (ONE A DAY) tablet Take 1 Tab by mouth daily. acetaminophen (TYLENOL EXTRA STRENGTH) 500 mg tablet Take 2 Tabs by mouth every six (6) hours as needed for Pain. Associated Diagnoses: Biceps tendinitis of right upper extremity           Activity: Activity as tolerated and no driving for today  Diet: Regular Diet  Wound Care: Keep wound clean and dry    Follow-up with Dr. Melva Real in 4 weeks.     Signed:  Bridget Chris MD  12/23/2019  8:12 AM

## 2019-12-23 NOTE — DISCHARGE INSTRUCTIONS
POSTOPERATIVE INSTRUCTION  FOR CERVICAL POLYPECTOMY  Upon discharge from the hospital, there are a few things to consider:        Absolutely no intercourse, douching or tampon use for 2 weeks. This precaution helps reduce complication of infection. You may expect some bleeding and bloody vaginal discharge for 2-6 weeks   following surgery. This will gradually turn yellow. You may return to normal everyday activities in 1-3 days. No strenuous activities, such as high impact exercise or heavy lifting until cleared by your doctor. Notify us if you experience:     a.  heavy vaginal bleeding (soaking one pad per hour)    b.  temperature of 101°  or greater       c.  severe pelvic or vaginal pain    d.  foul odor      Please call the office today at [phone] to schedule your checkup appointment     in 4 weeks. Above all, please notify us of a problem if it arises before we see you again. In an emergency, you may contact a doctor 24 hours a day at [phone]. DISCHARGE SUMMARY from Nurse    PATIENT INSTRUCTIONS:    After general anesthesia or intravenous sedation, for 24 hours or while taking prescription Narcotics:  · Limit your activities  · Do not drive and operate hazardous machinery  · Do not make important personal or business decisions  · Do  not drink alcoholic beverages  · If you have not urinated within 8 hours after discharge, please contact your surgeon on call.     Report the following to your surgeon:  · Excessive pain, swelling, redness or odor of or around the surgical area  · Temperature over 100.5  · Nausea and vomiting lasting longer than 4 hours or if unable to take medications  · Any signs of decreased circulation or nerve impairment to extremity: change in color, persistent  numbness, tingling, coldness or increase pain  · Any questions    What to do at Home:  Recommended activity: See surgical instructions,     These are general instructions for a healthy lifestyle:    No smoking/ No tobacco products/ Avoid exposure to second hand smoke  Surgeon General's Warning:  Quitting smoking now greatly reduces serious risk to your health. Obesity, smoking, and sedentary lifestyle greatly increases your risk for illness    A healthy diet, regular physical exercise & weight monitoring are important for maintaining a healthy lifestyle    You may be retaining fluid if you have a history of heart failure or if you experience any of the following symptoms:  Weight gain of 3 pounds or more overnight or 5 pounds in a week, increased swelling in our hands or feet or shortness of breath while lying flat in bed. Please call your doctor as soon as you notice any of these symptoms; do not wait until your next office visit. The discharge information has been reviewed with the patient and spouse. The patient and spouse verbalized understanding. Discharge medications reviewed with the patient and spouse and appropriate educational materials and side effects teaching were provided.   ___________________________________________________________________________________________________________________________________

## 2019-12-23 NOTE — H&P
Gynecology History and Physical    Name: Baldo Anderson MRN: [id number] SSN: [id number]    YOB: 1975  Age: 45 y.o. Sex: female       Subjective:      Chief complaint:  Cervical polyp, AUB     Procedure(s) (LRB):  CERVICAL POLYPECTOMY, POSSIBLE HYSTEROSCOPY, DILATION AND CURETTAGE (N/A). Baldo Anderson is a 45 y.o.  A0 with AUB and cervical polyp. Pt with lifelong h/o irregular menses. Pt reports h/o had 1 child 15 years ago, then struggled with infertility. Pt has been on cyclic provera 10 x 10 for menstrual regulation. Was initially scheduled for surgery 2 months ago, but this had to be rescheduled due to finding of hypokalemia on labs and concern for new anterior infarct on EKG. Pt has been taking oral potassium repletions, and pt cleared from cardiac standpoint by Dr. Isai Pendleton.      She is from [place], moved to [place] 5 years ago. Fluent English.       TVUS 19  THE UTERUS IS RETROFLEXED, NORMAL IN SIZE AND ECHOGENICITY. THE ENDOMETRIUM MEASURES 7MM IN THICKNESS. NO MASSES OR ABNORMALITIES ARE SEEN. RIGHT OVARY APPEARS WNL. LEFT OVARY APPEARS WNL. NO FREE FLUID IS SEEN IN THE CDS. THE CERVICAL CANAL APPEARS TO BE HYPREVASCULAR. CLINICAL COORELATION RECOMMENDED.     Ob/Gyn Hx:   A0 -1   LMP-19  Menarche- 14  Menses- irregular  Contraception-denies, pregnancy desired  STI- denies  ? SA-yes     Health maintenance:  Pap-2015, normal per patient, no history of abnormal  Mammo-denies  Colonoscopy- not indicated  Gardasil-denies    OB History    No obstetric history on file.        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    Migraine without status migrainosus, not intractable 3/24/2017     Past Surgical History:   Procedure Laterality Date    HX  SECTION  2004    HX CYST REMOVAL Right 10/15/14    right hand    HX ORTHOPAEDIC  2014    GANGLION CYST REMOVED RIGHT HAND      Social History     Occupational History    Not on file   Tobacco Use    Smoking status: Never Smoker    Smokeless tobacco: Never Used   Substance and Sexual Activity    Alcohol use: Never     Frequency: Never    Drug use: No    Sexual activity: Yes     Partners: Male     Birth control/protection: None     Family History   Problem Relation Age of Onset    Diabetes Mother     Hypertension Mother     Stroke Father     Hypertension Father     No Known Problems Son     No Known Problems Brother     Anesth Problems Neg Hx     Heart Disease Neg Hx         No Known Allergies  Prior to Admission medications    Medication Sig Start Date End Date Taking? Authorizing Provider   amLODIPine (NORVASC) 5 mg tablet Take 1 Tab by mouth daily. Patient taking differently: Take 5 mg by mouth nightly. 10/24/19  Yes Shagufta Hoover MD   multivitamin (ONE A DAY) tablet Take 1 Tab by mouth daily. Yes Provider, Historical   acetaminophen (TYLENOL EXTRA STRENGTH) 500 mg tablet Take 2 Tabs by mouth every six (6) hours as needed for Pain. Alison Mcmillan MD        Review of Systems:  A comprehensive review of systems was negative except for that written in the History of Present Illness. Objective:     Vitals:    19 0955 19 0610   BP:  103/77   Pulse:  87   Resp:  16   Temp:  98.1 °F (36.7 °C)   SpO2:  97%   Weight: 140 lb (63.5 kg) 140 lb (63.5 kg)   Height: 4' 9\" (1.448 m) 4' 9\" (1.448 m)       Physical Exam:  Patient without distress.   Heart: Regular rate and rhythm  Lung: clear to auscultation throughout lung fields, no wheezes, no rales, no rhonchi and normal respiratory effort  Abdomen: soft, nontender    Assessment:     Abnormal uterine bleeding with cervical polyp    Plan:     Procedure(s) (LRB):  CERVICAL POLYPECTOMY, POSSIBLE HYSTEROSCOPY, DILATION AND CURETTAGE (N/A)  Discussed the risks of surgery including the risks of bleeding, infection, deep vein thrombosis, and surgical injuries to internal organs including but not limited to the bowels, bladder, rectum, and female reproductive organs.  The patient understands the risks; any and all questions were answered to the patient's satisfaction.      -SCDs for DVT ppx  -no abx indicated     Signed By:  Prem Packer MD     December 23, 2019

## 2019-12-23 NOTE — BRIEF OP NOTE
BRIEF OPERATIVE NOTE    Date of Procedure: 12/23/2019   Preoperative Diagnosis: CERVICAL POLYP  Postoperative Diagnosis: CERVICAL POLYP    Procedure(s):   CERVICAL POLYPECTOMY  Surgeon(s) and Role:     Lula Harp MD - Primary         Surgical Assistant: none    Surgical Staff:  Circ-1: Pramod Contreras RN  Scrub Tech-1: Jaqueline Méndez  Event Time In Time Out   Incision Start 2971    Incision Close 0757      Anesthesia: MAC   Estimated Blood Loss: <5cc  Specimens:   ID Type Source Tests Collected by Time Destination   1 : cervical polyp Fresh Cervical  Deann Mckeon MD 12/23/2019 5412 Pathology      Findings: 2-3cm endocervical polyp, otherwise normal appearing cervix, bimanual exam with small mobile uterus, otherwise unremarkable exam  Complications: none  Implants: * No implants in log *

## 2020-01-29 ENCOUNTER — OFFICE VISIT (OUTPATIENT)
Dept: FAMILY MEDICINE CLINIC | Age: 45
End: 2020-01-29

## 2020-01-29 VITALS
WEIGHT: 144 LBS | TEMPERATURE: 98.3 F | BODY MASS INDEX: 31.07 KG/M2 | HEART RATE: 98 BPM | OXYGEN SATURATION: 97 % | RESPIRATION RATE: 16 BRPM | DIASTOLIC BLOOD PRESSURE: 84 MMHG | HEIGHT: 57 IN | SYSTOLIC BLOOD PRESSURE: 128 MMHG

## 2020-01-29 DIAGNOSIS — I10 HTN, GOAL BELOW 140/90: ICD-10-CM

## 2020-01-29 DIAGNOSIS — Z12.31 SCREENING MAMMOGRAM FOR HIGH-RISK PATIENT: ICD-10-CM

## 2020-01-29 DIAGNOSIS — Z00.00 ROUTINE GENERAL MEDICAL EXAMINATION AT A HEALTH CARE FACILITY: Primary | ICD-10-CM

## 2020-01-29 RX ORDER — AMLODIPINE BESYLATE 5 MG/1
5 TABLET ORAL
Qty: 90 TAB | Refills: 1 | Status: SHIPPED | OUTPATIENT
Start: 2020-01-29 | End: 2020-08-09

## 2020-01-29 NOTE — PATIENT INSTRUCTIONS
Well Visit, Ages 25 to 48: Care Instructions Your Care Instructions Physical exams can help you stay healthy. Your doctor has checked your overall health and may have suggested ways to take good care of yourself. He or she also may have recommended tests. At home, you can help prevent illness with healthy eating, regular exercise, and other steps. Follow-up care is a key part of your treatment and safety. Be sure to make and go to all appointments, and call your doctor if you are having problems. It's also a good idea to know your test results and keep a list of the medicines you take. How can you care for yourself at home? · Reach and stay at a healthy weight. This will lower your risk for many problems, such as obesity, diabetes, heart disease, and high blood pressure. · Get at least 30 minutes of physical activity on most days of the week. Walking is a good choice. You also may want to do other activities, such as running, swimming, cycling, or playing tennis or team sports. Discuss any changes in your exercise program with your doctor. · Do not smoke or allow others to smoke around you. If you need help quitting, talk to your doctor about stop-smoking programs and medicines. These can increase your chances of quitting for good. · Talk to your doctor about whether you have any risk factors for sexually transmitted infections (STIs). Having one sex partner (who does not have STIs and does not have sex with anyone else) is a good way to avoid these infections. · Use birth control if you do not want to have children at this time. Talk with your doctor about the choices available and what might be best for you. · Protect your skin from too much sun. When you're outdoors from 10 a.m. to 4 p.m., stay in the shade or cover up with clothing and a hat with a wide brim. Wear sunglasses that block UV rays. Even when it's cloudy, put broad-spectrum sunscreen (SPF 30 or higher) on any exposed skin. · See a dentist one or two times a year for checkups and to have your teeth cleaned. · Wear a seat belt in the car. Follow your doctor's advice about when to have certain tests. These tests can spot problems early. For everyone · Cholesterol. Have the fat (cholesterol) in your blood tested after age 21. Your doctor will tell you how often to have this done based on your age, family history, or other things that can increase your risk for heart disease. · Blood pressure. Have your blood pressure checked during a routine doctor visit. Your doctor will tell you how often to check your blood pressure based on your age, your blood pressure results, and other factors. · Vision. Talk with your doctor about how often to have a glaucoma test. 
· Diabetes. Ask your doctor whether you should have tests for diabetes. · Colon cancer. Your risk for colorectal cancer gets higher as you get older. Some experts say that adults should start regular screening at age 48 and stop at age 76. Others say to start before age 48 or continue after age 76. Talk with your doctor about your risk and when to start and stop screening. For women · Breast exam and mammogram. Talk to your doctor about when you should have a clinical breast exam and a mammogram. Medical experts differ on whether and how often women under 50 should have these tests. Your doctor can help you decide what is right for you. · Cervical cancer screening test and pelvic exam. Begin with a Pap test at age 24. The test often is part of a pelvic exam. Starting at age 27, you may choose to have a Pap test, an HPV test, or both tests at the same time (called co-testing). Talk with your doctor about how often to have testing. · Tests for sexually transmitted infections (STIs). Ask whether you should have tests for STIs. You may be at risk if you have sex with more than one person, especially if your partners do not wear condoms. For men · Tests for sexually transmitted infections (STIs). Ask whether you should have tests for STIs. You may be at risk if you have sex with more than one person, especially if you do not wear a condom. · Testicular cancer exam. Ask your doctor whether you should check your testicles regularly. · Prostate exam. Talk to your doctor about whether you should have a blood test (called a PSA test) for prostate cancer. Experts differ on whether and when men should have this test. Some experts suggest it if you are older than 39 and are -American or have a father or brother who got prostate cancer when he was younger than 72. When should you call for help? Watch closely for changes in your health, and be sure to contact your doctor if you have any problems or symptoms that concern you. Where can you learn more? Go to http://gómez-william.info/. Enter P072 in the search box to learn more about \"Well Visit, Ages 25 to 48: Care Instructions. \" Current as of: December 13, 2018 Content Version: 12.2 © 8865-1200 [employer]. Care instructions adapted under license by [employer] (which disclaims liability or warranty for this information). If you have questions about a medical condition or this instruction, always ask your healthcare professional. [address] any warranty or liability for your use of this information.

## 2020-01-29 NOTE — PROGRESS NOTES
Chief Complaint   Patient presents with    Complete Physical     1. Have you been to the ER, urgent care clinic since your last visit? Hospitalized since your last visit? No    2. Have you seen or consulted any other health care providers outside of the [address] [place] [address] since your last visit? Include any pap smears or colon screening. No       Patient presents in office for CPE. No concerns noted.

## 2020-01-29 NOTE — PROGRESS NOTES
HISTORY OF PRESENT ILLNESS  Estrella Mccallum is a 45 y.o. female. seen to establish care and complete physical  Want to change PCP because of language ( she speaks [place])  Just had cervical polypectomy on 12/23/2019. Had very heavy period after procedure  HPI  Health Maintenance  Immunizations:    Influenza: up to date. Tetanus: up to date. Gardasil: n/a. Cancer screening:   Cervical: reviewed guidelines, UTD. Breast: mammogram was ordered, reviewed guidelines, reviewed SBE with her    Cardiovascular Review  The patient has hypertension. She reports taking medications as instructed, no medication side effects noted, no chest pain on exertion, no dyspnea on exertion, no swelling of ankles, no orthostatic dizziness or lightheadedness, no orthopnea or paroxysmal nocturnal dyspnea, no palpitations, no intermittent claudication symptoms, no muscle aches or pain. Diet and Lifestyle: generally follows a low fat low cholesterol diet, generally follows a low sodium diet, exercises regularly, nonsmoker. Lab review: labs reviewed and discussed with patient. Had Echo for abnormal EKG in 11/2019, result was reassuring  Medicines:Chlorthalidone was changed to Amlodipine due to hypokalemia      Patient Care Team:  Yun Boston MD as PCP - General (Family Practice)  Antonia Mancilla MD as PCP - [facility] Empaneled Provider  Vilma Cabral DO (Neurology)  Jasmin Cervantes MD as Physician (Obstetrics & Gynecology)  Leena Anderson NP (Surgery)  Vitor Velez MD (Cardiology)  Jasmin Cervantes MD (Obstetrics & Gynecology)       The following sections were reviewed & updated as appropriate: PMH, PSH, FH, and SH. Review of Systems   Constitutional: Negative for chills, fever and malaise/fatigue. HENT: Negative for congestion, ear pain, sore throat and tinnitus. Eyes: Negative for blurred vision, double vision, pain and discharge.    Respiratory: Negative for cough, shortness of breath and wheezing. Cardiovascular: Negative for chest pain, palpitations and leg swelling. Gastrointestinal: Negative for abdominal pain, blood in stool, constipation, diarrhea, nausea and vomiting. Genitourinary: Negative for dysuria, frequency, hematuria and urgency. Heavy period   Musculoskeletal: Negative for back pain, joint pain and myalgias. Skin: Negative for rash. Hair fall   Neurological: Negative for dizziness, tremors, seizures and headaches. Endo/Heme/Allergies: Negative for polydipsia. Does not bruise/bleed easily. Psychiatric/Behavioral: Negative for depression and substance abuse. The patient is not nervous/anxious. Physical Exam  Vitals signs and nursing note reviewed. Constitutional:       Appearance: She is well-developed. HENT:      Head: Normocephalic and atraumatic. Right Ear: External ear normal.      Left Ear: External ear normal.      Nose: Nose normal.   Eyes:      General: No scleral icterus. Conjunctiva/sclera: Conjunctivae normal.      Pupils: Pupils are equal, round, and reactive to light. Neck:      Musculoskeletal: Normal range of motion and neck supple. Thyroid: No thyromegaly. Vascular: No JVD. Cardiovascular:      Rate and Rhythm: Normal rate and regular rhythm. Heart sounds: Normal heart sounds. No murmur. No friction rub. No gallop. Pulmonary:      Effort: Pulmonary effort is normal.      Breath sounds: Normal breath sounds. No wheezing or rales. Chest:      Chest wall: No tenderness. Breasts: Breasts are symmetrical.         Right: No inverted nipple, mass, nipple discharge, skin change or tenderness. Left: No inverted nipple, mass, nipple discharge, skin change or tenderness. Abdominal:      General: Bowel sounds are normal. There is no distension. Palpations: Abdomen is soft. There is no mass. Tenderness: There is no abdominal tenderness. Musculoskeletal: Normal range of motion. General: No tenderness. Lymphadenopathy:      Cervical: No cervical adenopathy. Skin:     General: Skin is warm and dry. Findings: No rash. Neurological:      Mental Status: She is alert and oriented to person, place, and time. Cranial Nerves: No cranial nerve deficit. Deep Tendon Reflexes: Reflexes are normal and symmetric. Psychiatric:         Behavior: Behavior normal.         Thought Content: Thought content normal.         Judgment: Judgment normal.         ASSESSMENT and PLAN  Diagnoses and all orders for this visit:    1. Routine general medical examination at a health care facility  -     CBC WITH AUTOMATED DIFF  -     METABOLIC PANEL, COMPREHENSIVE  -     HEMOGLOBIN A1C WITH EAG  -     TSH REFLEX TO T4  -     URINALYSIS W/ RFLX MICROSCOPIC  -     LIPID PANEL    2. HTN, goal below 140/90  -     amLODIPine (NORVASC) 5 mg tablet; Take 1 Tab by mouth nightly. 3. Screening mammogram for high-risk patient  -     [facility] MAMMO BI SCREENING INCL CAD; Future      Discussed lifestyle issues and health guidance given  Patient was given an after visit summary which includes diagnoses, vital signs, current medications, instructions and references & authorized prescriptions . Results of labs will be conveyed to patient, once available. Pt verbalized instructions I provided and expressed understanding of discussion that was held today. Follow-up and Dispositions    · Return in about 6 months (around 7/29/2020).

## 2020-02-01 LAB
ALBUMIN SERPL-MCNC: 4.5 G/DL (ref 3.8–4.8)
ALBUMIN/GLOB SERPL: 1.6 {RATIO} (ref 1.2–2.2)
ALP SERPL-CCNC: 86 IU/L (ref 39–117)
ALT SERPL-CCNC: 42 IU/L (ref 0–32)
APPEARANCE UR: ABNORMAL
AST SERPL-CCNC: 25 IU/L (ref 0–40)
BASOPHILS # BLD AUTO: 0.1 X10E3/UL (ref 0–0.2)
BASOPHILS NFR BLD AUTO: 1 %
BILIRUB SERPL-MCNC: 0.4 MG/DL (ref 0–1.2)
BILIRUB UR QL STRIP: NEGATIVE
BUN SERPL-MCNC: 7 MG/DL (ref 6–24)
BUN/CREAT SERPL: 13 (ref 9–23)
CALCIUM SERPL-MCNC: 9.3 MG/DL (ref 8.7–10.2)
CHLORIDE SERPL-SCNC: 100 MMOL/L (ref 96–106)
CHOLEST SERPL-MCNC: 164 MG/DL (ref 100–199)
CO2 SERPL-SCNC: 23 MMOL/L (ref 20–29)
COLOR UR: YELLOW
CREAT SERPL-MCNC: 0.54 MG/DL (ref 0.57–1)
EOSINOPHIL # BLD AUTO: 0.2 X10E3/UL (ref 0–0.4)
EOSINOPHIL NFR BLD AUTO: 2 %
ERYTHROCYTE [DISTWIDTH] IN BLOOD BY AUTOMATED COUNT: 12.3 % (ref 11.7–15.4)
EST. AVERAGE GLUCOSE BLD GHB EST-MCNC: 120 MG/DL
GLOBULIN SER CALC-MCNC: 2.9 G/DL (ref 1.5–4.5)
GLUCOSE SERPL-MCNC: 90 MG/DL (ref 65–99)
GLUCOSE UR QL: NEGATIVE
HBA1C MFR BLD: 5.8 % (ref 4.8–5.6)
HCT VFR BLD AUTO: 42.1 % (ref 34–46.6)
HDLC SERPL-MCNC: 44 MG/DL
HGB BLD-MCNC: 14.9 G/DL (ref 11.1–15.9)
HGB UR QL STRIP: NEGATIVE
IMM GRANULOCYTES # BLD AUTO: 0 X10E3/UL (ref 0–0.1)
IMM GRANULOCYTES NFR BLD AUTO: 0 %
INTERPRETATION, 910389: NORMAL
KETONES UR QL STRIP: NEGATIVE
LDLC SERPL CALC-MCNC: 97 MG/DL (ref 0–99)
LEUKOCYTE ESTERASE UR QL STRIP: NEGATIVE
LYMPHOCYTES # BLD AUTO: 2.7 X10E3/UL (ref 0.7–3.1)
LYMPHOCYTES NFR BLD AUTO: 35 %
MCH RBC QN AUTO: 31.1 PG (ref 26.6–33)
MCHC RBC AUTO-ENTMCNC: 35.4 G/DL (ref 31.5–35.7)
MCV RBC AUTO: 88 FL (ref 79–97)
MICRO URNS: ABNORMAL
MONOCYTES # BLD AUTO: 0.5 X10E3/UL (ref 0.1–0.9)
MONOCYTES NFR BLD AUTO: 7 %
NEUTROPHILS # BLD AUTO: 4.4 X10E3/UL (ref 1.4–7)
NEUTROPHILS NFR BLD AUTO: 55 %
NITRITE UR QL STRIP: NEGATIVE
PH UR STRIP: 8.5 [PH] (ref 5–7.5)
PLATELET # BLD AUTO: 244 X10E3/UL (ref 150–450)
POTASSIUM SERPL-SCNC: 3.6 MMOL/L (ref 3.5–5.2)
PROT SERPL-MCNC: 7.4 G/DL (ref 6–8.5)
PROT UR QL STRIP: NEGATIVE
RBC # BLD AUTO: 4.79 X10E6/UL (ref 3.77–5.28)
SODIUM SERPL-SCNC: 140 MMOL/L (ref 134–144)
SP GR UR: 1.01 (ref 1–1.03)
TRIGL SERPL-MCNC: 115 MG/DL (ref 0–149)
TSH SERPL DL<=0.005 MIU/L-ACNC: 2.74 UIU/ML (ref 0.45–4.5)
UROBILINOGEN UR STRIP-MCNC: 0.2 MG/DL (ref 0.2–1)
VLDLC SERPL CALC-MCNC: 23 MG/DL (ref 5–40)
WBC # BLD AUTO: 7.9 X10E3/UL (ref 3.4–10.8)

## 2020-02-01 NOTE — PROGRESS NOTES
Please inform patient,  Blood count,kidney,liver,cholesterol,thyroid,urine results are very normal  Average sugar is in prediabetic range.  To work harder on diet and exercise to improve weight and sugar readings  thanks

## 2020-02-07 ENCOUNTER — OFFICE VISIT (OUTPATIENT)
Dept: OBGYN CLINIC | Age: 45
End: 2020-02-07

## 2020-02-07 ENCOUNTER — HOSPITAL ENCOUNTER (OUTPATIENT)
Dept: MAMMOGRAPHY | Age: 45
Discharge: HOME | End: 2020-02-07
Attending: FAMILY MEDICINE
Payer: COMMERCIAL

## 2020-02-07 VITALS — BODY MASS INDEX: 31.07 KG/M2 | WEIGHT: 144 LBS | HEIGHT: 57 IN

## 2020-02-07 DIAGNOSIS — Z12.31 SCREENING MAMMOGRAM FOR HIGH-RISK PATIENT: ICD-10-CM

## 2020-02-07 DIAGNOSIS — Z11.51 SCREENING FOR HPV (HUMAN PAPILLOMAVIRUS): ICD-10-CM

## 2020-02-07 DIAGNOSIS — Z12.4 CERVICAL CANCER SCREENING: ICD-10-CM

## 2020-02-07 DIAGNOSIS — Z09 POSTOP CHECK: Primary | ICD-10-CM

## 2020-02-07 PROCEDURE — 77067 SCR MAMMO BI INCL CAD: CPT

## 2020-02-07 NOTE — PROGRESS NOTES
Call placed to patient. Name and  verified. Reviewed recent labs with patient.  She verbalized understanding and was appreciative of call

## 2020-02-07 NOTE — PROGRESS NOTES
Problem Visit    Rajani Hood is a 45 y.o.  presenting for problem visit. Her main concern today is: post op  Cervical polypectomy 2019 in the OR by Dr. Pamela Espinoza. Doing well since her surgery. She relays that her cycle is different than before surgery. Spotting for 3 days before true menstrual flow. Reports her period is always irregular in interval.  She was prescribed medroxyprogesterone cyclic by Dr. Pamela Espinoza and wants to know if she should still take it.      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    Migraine without status migrainosus, not intractable 3/24/2017       Past Surgical History:   Procedure Laterality Date    HX  SECTION      HX CYST REMOVAL Right 10/15/14    right hand    HX ORTHOPAEDIC      GANGLION CYST REMOVED RIGHT HAND        Family History   Problem Relation Age of Onset    Diabetes Mother     Hypertension Mother     Stroke Father     Hypertension Father     No Known Problems Son     No Known Problems Brother     Anesth Problems Neg Hx     Heart Disease Neg Hx        Social History     Socioeconomic History    Marital status:      Spouse name: Not on file    Number of children: Not on file    Years of education: Not on file    Highest education level: Not on file   Occupational History    Not on file   Social Needs    Financial resource strain: Not on file    Food insecurity:     Worry: Not on file     Inability: Not on file    Transportation needs:     Medical: Not on file     Non-medical: Not on file   Tobacco Use    Smoking status: Never Smoker    Smokeless tobacco: Never Used   Substance and Sexual Activity    Alcohol use: Never     Frequency: Never    Drug use: No    Sexual activity: Yes Partners: Male     Birth control/protection: None   Lifestyle    Physical activity:     Days per week: Not on file     Minutes per session: Not on file    Stress: Not on file   Relationships    Social connections:     Talks on phone: Not on file     Gets together: Not on file     Attends Yarsani service: Not on file     Active member of club or organization: Not on file     Attends meetings of clubs or organizations: Not on file     Relationship status: Not on file    Intimate partner violence:     Fear of current or ex partner: Not on file     Emotionally abused: Not on file     Physically abused: Not on file     Forced sexual activity: Not on file   Other Topics Concern    Not on file   Social History Narrative    Not on file       Current Outpatient Medications   Medication Sig Dispense Refill    amLODIPine (NORVASC) 5 mg tablet Take 1 Tab by mouth nightly. 90 Tab 1    multivitamin (ONE A DAY) tablet Take 1 Tab by mouth daily.  acetaminophen (TYLENOL EXTRA STRENGTH) 500 mg tablet Take 2 Tabs by mouth every six (6) hours as needed for Pain.          No Known Allergies    Review of Systems - History obtained from the patient  Constitutional: negative for weight loss, fever, night sweats  HEENT: negative for hearing loss, earache, congestion, snoring, sorethroat  CV: negative for chest pain, palpitations, edema  Resp: negative for cough, shortness of breath, wheezing  GI: negative for change in bowel habits, abdominal pain, black or bloody stools  : negative for frequency, dysuria, hematuria, vaginal discharge  MSK: negative for back pain, joint pain, muscle pain  Breast: negative for breast lumps, nipple discharge, galactorrhea  Skin :negative for itching, rash, hives  Neuro: negative for dizziness, headache, confusion, weakness  Psych: negative for anxiety, depression, change in mood  Heme/lymph: negative for bleeding, bruising, pallor    Physical Exam    Visit Vitals  Ht 4' 9\" (1.448 m)   Wt 144 lb (65.3 kg)   LMP 01/30/2020 (Exact Date)   BMI 31.16 kg/m²         OBGyn Exam      Constitutional  · Appearance: well-nourished, well developed, alert, in no acute distress    HENT  · Head and Face: appears normal    Neck  · Inspection/Palpation: normal appearance, no masses or tenderness  · Thyroid: gland size normal, nontender    Chest  · Respiratory Effort: non-labored breathing    Cardiovascular  · Extremities: no peripheral edema    Gastrointestinal  · Abdominal Examination: abdomen non-distended, non-tender to palpation, no masses present  · Liver and spleen: no hepatomegaly present, spleen not palpable  · Hernias: no hernias identified    Genitourinary  · External Genitalia: normal appearance for age, no discharge present, no tenderness present, no inflammatory lesions present, no masses present, no atrophy present  · Vagina: normal vaginal vault without central or paravaginal defects, no discharge present, no inflammatory lesions present, no masses present  · Bladder: non-tender to palpation  · Urethra: appears normal  · Cervix: normal No polyps seen, well healed      Skin  · General Inspection: no rash, no lesions identified    Neurologic/Psychiatric  · Mental Status:  · Orientation: grossly oriented to person, place and time  · Mood and Affect: mood normal, affect appropriate      Assessment/Plan:    1. Postop check  Doing well from the post-op surgical standpoint. Surgical pathology reviewed with patient. May return to normal activities. Advised to call with questions or concerns. Advised she can continue the cyclic provera for menstrual regulation. 2. Cervical cancer screening    - PAP IG, APTIMA HPV AND RFX 16/18,45 ([id number])    3.  Screening for HPV (human papillomavirus)    - PAP IG, APTIMA HPV AND RFX 16/18,45 ([id number])          Gaurav Jama MD

## 2020-02-10 ENCOUNTER — HOSPITAL ENCOUNTER (OUTPATIENT)
Dept: MAMMOGRAPHY | Age: 45
Discharge: HOME | End: 2020-02-10
Attending: FAMILY MEDICINE
Payer: COMMERCIAL

## 2020-02-10 DIAGNOSIS — R92.8 ABNORMAL MAMMOGRAM: ICD-10-CM

## 2020-02-10 PROCEDURE — 77061 BREAST TOMOSYNTHESIS UNI: CPT

## 2020-02-10 PROCEDURE — 76642 ULTRASOUND BREAST LIMITED: CPT

## 2020-02-12 LAB
CYTOLOGIST CVX/VAG CYTO: NORMAL
CYTOLOGY CVX/VAG DOC CYTO: NORMAL
CYTOLOGY CVX/VAG DOC THIN PREP: NORMAL
DX ICD CODE: NORMAL
HPV I/H RISK 4 DNA CVX QL PROBE+SIG AMP: NEGATIVE
Lab: NORMAL
OTHER STN SPEC: NORMAL
STAT OF ADQ CVX/VAG CYTO-IMP: NORMAL

## 2020-02-17 NOTE — PROGRESS NOTES
Spoke with patient. Advised of negative/normal pap smear results. The patient verbalized understanding.

## 2020-03-12 ENCOUNTER — OFFICE VISIT (OUTPATIENT)
Dept: FAMILY MEDICINE CLINIC | Age: 45
End: 2020-03-12

## 2020-03-12 VITALS
DIASTOLIC BLOOD PRESSURE: 78 MMHG | BODY MASS INDEX: 29.77 KG/M2 | HEART RATE: 91 BPM | OXYGEN SATURATION: 98 % | TEMPERATURE: 97.7 F | HEIGHT: 57 IN | SYSTOLIC BLOOD PRESSURE: 116 MMHG | RESPIRATION RATE: 18 BRPM | WEIGHT: 138 LBS

## 2020-03-12 DIAGNOSIS — J02.9 SORE THROAT: Primary | ICD-10-CM

## 2020-03-12 LAB
S PYO AG THROAT QL: NEGATIVE
VALID INTERNAL CONTROL?: YES

## 2020-03-12 RX ORDER — MEDROXYPROGESTERONE ACETATE 10 MG/1
TABLET ORAL
COMMUNITY
Start: 2020-03-02 | End: 2020-05-18 | Stop reason: ALTCHOICE

## 2020-03-12 NOTE — PROGRESS NOTES
Estrella Mccallum  45 y.o. female  1975  [address] [facility] [place] [address]  [id number]     DAVY Sheehan 55       Encounter Date: 3/12/2020           Established Patient Visit Note: Dashawn Christian MD    Reason for Appointment:  Chief Complaint   Patient presents with    Cough     dry cough       History of Present Illness:  History provided by patient    Estrella Mccallum is a 45 y.o. female who presents to clinic today for:    URI Symptoms  Duration: 2-3 days  Symptoms: sore throat, dry cough, Chest congestion, CP with coughing  Negatives: denies muscle aches, denies dyspnea, denies fevers/chills  Course: same  Medicines: Claritin  Sick Contacts: denies, but works in [employer] Travel: denies  Comorbidity: denies hx of asthma; denies hx of allergies  Associated Symptoms: She also endorses having bump on right inner aspect of mouth under right side of tongue    Review of Systems  Const: denies fatigue, denies fever, denies chills  Cardiac: denies palpitations; CP with coughingi  Resp: denies dyspnea, denies wheezing        Allergies: Patient has no known allergies. Medications: (Updated to reflect final medication list after visit)    Current Outpatient Medications:     medroxyPROGESTERone (PROVERA) 10 mg tablet, TAKE 1 TABLET BY MOUTH ONCE DAILY FOR 10 DAYS, Disp: , Rfl:     phenol throat spray (CHLORASEPTIC) 1.4 % spray, Take 1 Spray by mouth as needed for Sore throat., Disp: 1 Bottle, Rfl: 0    amLODIPine (NORVASC) 5 mg tablet, Take 1 Tab by mouth nightly., Disp: 90 Tab, Rfl: 1    multivitamin (ONE A DAY) tablet, Take 1 Tab by mouth daily. , Disp: , Rfl:     acetaminophen (TYLENOL EXTRA STRENGTH) 500 mg tablet, Take 2 Tabs by mouth every six (6) hours as needed for Pain., Disp: , Rfl:     History  Patient Care Team:  Yun Boston MD as PCP - General (Family Practice)  Yun Boston MD [facility] PCP [facility]  Vilma TANG DO (Neurology)  Catherine Asif MD as Physician (Obstetrics & Gynecology)  Arcelia Torres NP (Surgery)  Jimmy Osborne MD (Cardiology)  Catherine Asif MD (Obstetrics & Gynecology)    Past Medical History: she has a past medical history of Brain lesion (3/24/2017), GERD (gastroesophageal reflux disease), HTN, goal below 140/90 (3/8/2018), Hypokalemia (10/24/2019), Immune to varicella, LTBI (latent tuberculosis infection) (3/24/2017), and Migraine without status migrainosus, not intractable (3/24/2017). Past Surgical History: she has a past surgical history that includes hx  section (); hx cyst removal (Right, 10/15/14); and hx orthopaedic (). Family Medical History: family history includes Diabetes in her mother; Hypertension in her father and mother; No Known Problems in her brother and son; Stroke in her father. Social History: she reports that she has never smoked. She has never used smokeless tobacco. She reports that she does not drink alcohol or use drugs. No specialty comments available. There are no preventive care reminders to display for this patient. Objective:   Visit Vitals  /78 (BP 1 Location: Left arm, BP Patient Position: Sitting)   Pulse 91   Temp 97.7 °F (36.5 °C)   Resp 18   Ht 4' 9\" (1.448 m)   Wt 138 lb (62.6 kg)   LMP 2020 (Exact Date)   SpO2 98%   BMI 29.86 kg/m²     Wt Readings from Last 3 Encounters:   20 138 lb (62.6 kg)   20 144 lb (65.3 kg)   20 144 lb (65.3 kg)       Physical Exam  Vitals signs and nursing note reviewed. Constitutional:       General: She is not in acute distress. Appearance: Normal appearance. She is not toxic-appearing. HENT:      Head: Normocephalic and atraumatic.       Right Ear: Tympanic membrane, ear canal and external ear normal.      Left Ear: Tympanic membrane, ear canal and external ear normal.      Nose: Nose normal.      Mouth/Throat:      Mouth: Mucous membranes are moist. Eyes:      Extraocular Movements: Extraocular movements intact. Conjunctiva/sclera: Conjunctivae normal.      Pupils: Pupils are equal, round, and reactive to light. Neck:      Musculoskeletal: Normal range of motion and neck supple. No neck rigidity or muscular tenderness. Cardiovascular:      Rate and Rhythm: Normal rate and regular rhythm. Pulses: Normal pulses. Heart sounds: Normal heart sounds. No murmur. No friction rub. No gallop. Pulmonary:      Effort: Pulmonary effort is normal. No respiratory distress. Breath sounds: Normal breath sounds. No wheezing, rhonchi or rales. Skin:     Coloration: Skin is not jaundiced. Neurological:      Mental Status: She is alert. Cranial Nerves: Cranial nerves are intact. Motor: Motor function is intact. Coordination: Coordination is intact. Recent Results (from the past 12 hour(s))   AMB POC RAPID STREP A    Collection Time: 03/12/20 10:12 AM   Result Value Ref Range    VALID INTERNAL CONTROL POC Yes     Group A Strep Ag Negative Negative       Assessment & Plan:    Diagnoses and all orders for this visit:    1. Sore throat: Rapid strep negative, no fever or abnormal vital signs. Physical exam shows mild erythema and a single small sore on right lateral aspect of floor of mouth. Will treat symptomatically with chloraseptic spray. discussed red flag symptoms and reasons to call or go to ED. Patient expresses agreement  -     AMB POC RAPID STREP A    Other orders  -     phenol throat spray (CHLORASEPTIC) 1.4 % spray; Take 1 Spray by mouth as needed for Sore throat. I have discussed the diagnosis with the patient and the intended plan as seen in the above orders. The patient has received an after-visit summary along with patient information handout. I have discussed medication side effects and warnings with the patient as well.     Disposition  Follow-up and Dispositions    · Return if symptoms worsen or fail to improve.            Arturo Diaz MD

## 2020-03-12 NOTE — PROGRESS NOTES
Frida Hutchison is a 45 y.o. female    Chief Complaint   Patient presents with    Cough     dry cough     1. Have you been to the ER, urgent care clinic since your last visit? Hospitalized since your last visit? No      2. Have you seen or consulted any other health care providers outside of the [address] [place] [address] since your last visit? Include any pap smears or colon screening.  No

## 2020-03-16 ENCOUNTER — OFFICE VISIT (OUTPATIENT)
Dept: FAMILY MEDICINE CLINIC | Age: 45
End: 2020-03-16

## 2020-03-16 VITALS
BODY MASS INDEX: 29.77 KG/M2 | HEIGHT: 57 IN | RESPIRATION RATE: 22 BRPM | WEIGHT: 138 LBS | DIASTOLIC BLOOD PRESSURE: 91 MMHG | SYSTOLIC BLOOD PRESSURE: 146 MMHG | TEMPERATURE: 98.2 F | HEART RATE: 100 BPM

## 2020-03-16 DIAGNOSIS — J02.9 PHARYNGITIS, UNSPECIFIED ETIOLOGY: ICD-10-CM

## 2020-03-16 DIAGNOSIS — J30.1 SEASONAL ALLERGIC RHINITIS DUE TO POLLEN: Primary | ICD-10-CM

## 2020-03-16 RX ORDER — MINERAL OIL
180 ENEMA (ML) RECTAL
Qty: 30 TAB | Refills: 5 | Status: SHIPPED | OUTPATIENT
Start: 2020-03-16 | End: 2021-09-01 | Stop reason: ALTCHOICE

## 2020-03-16 RX ORDER — FLUTICASONE PROPIONATE 50 MCG
2 SPRAY, SUSPENSION (ML) NASAL DAILY
Qty: 1 BOTTLE | Refills: 5 | Status: SHIPPED | OUTPATIENT
Start: 2020-03-16 | End: 2020-07-30 | Stop reason: ALTCHOICE

## 2020-03-16 RX ORDER — LIDOCAINE HYDROCHLORIDE 20 MG/ML
15 SOLUTION OROPHARYNGEAL
Qty: 1 BOTTLE | Refills: 5 | Status: SHIPPED | OUTPATIENT
Start: 2020-03-16 | End: 2020-05-18 | Stop reason: ALTCHOICE

## 2020-03-16 NOTE — LETTER
NOTIFICATION RETURN TO WORK / SCHOOL 
 
3/18/2020 1:41 PM 
 
Ms. Jason Reyes [address] [place] [id number] To Whom It May Concern: 
 
Jason Reyes is currently under the care of DAVY Sheehan 55. She will return to work/school on:03/19/2020 If there are questions or concerns please have the patient contact our office. Sincerely, Deanna Mccarty, NP

## 2020-03-16 NOTE — PROGRESS NOTES
[facility] Note    Ariane Escalona is a 45 y.o. female who was seen in clinic today (3/16/2020). Subjective:  Upper Respiratory Infection  Patient complains of symptoms of a URI. Symptoms include sore throat and cough. Onset of symptoms was 5 days ago, gradually worsening since that time. She also c/o achiness, coryza, cough described as productive of clear sputum, post nasal drip, sore throat and headache for the past 5 days . She is drinking plenty of fluids. . Evaluation to date: seen previously and thought to have a viral URI. Treatment to date: Amoxicillin, Dayquil and Nyquil. .      Prior to Admission medications    Medication Sig Start Date End Date Taking? Authorizing Provider   fluticasone propionate (FLONASE) 50 mcg/actuation nasal spray 2 Sprays by Both Nostrils route daily. 3/16/20  Yes Benji Pittman, NP   lidocaine (XYLOCAINE) 2 % solution Take 15 mL by mouth every two (2) hours as needed for Pain. 3/16/20  Yes Benji Pittman NP   fexofenadine (ALLEGRA) 180 mg tablet Take 1 Tab by mouth daily as needed for Allergies. 3/16/20  Yes Benji Pittman NP   medroxyPROGESTERone (PROVERA) 10 mg tablet TAKE 1 TABLET BY MOUTH ONCE DAILY FOR 10 DAYS 3/2/20  Yes Provider, Historical   amLODIPine (NORVASC) 5 mg tablet Take 1 Tab by mouth nightly. 1/29/20  Yes Brooke Leal MD   multivitamin (ONE A DAY) tablet Take 1 Tab by mouth daily. Yes Provider, Historical   acetaminophen (TYLENOL EXTRA STRENGTH) 500 mg tablet Take 2 Tabs by mouth every six (6) hours as needed for Pain. Yes Darrel Khalil MD          No Known Allergies        ROS  See HPI    Objective:   Physical Exam  Vitals signs and nursing note reviewed. Constitutional:       General: She is not in acute distress. Appearance: She is well-developed.    HENT:      Right Ear: Tympanic membrane and ear canal normal.      Left Ear: Tympanic membrane and ear canal normal.      Nose: Mucosal edema present. Right Sinus: No maxillary sinus tenderness or frontal sinus tenderness. Left Sinus: No maxillary sinus tenderness or frontal sinus tenderness. Cardiovascular:      Rate and Rhythm: Normal rate and regular rhythm. Heart sounds: Normal heart sounds. No murmur. Pulmonary:      Effort: Pulmonary effort is normal.      Breath sounds: Normal breath sounds. No decreased breath sounds, wheezing or rhonchi. Lymphadenopathy:      Cervical: No cervical adenopathy. Neurological:      Mental Status: She is alert and oriented to person, place, and time. Psychiatric:         Behavior: Behavior normal.           Visit Vitals  BP (!) 146/91 (BP 1 Location: Left arm, BP Patient Position: Sitting)   Pulse 100   Temp 98.2 °F (36.8 °C) (Oral)   Resp 22   Ht 4' 9\" (1.448 m)   Wt 138 lb (62.6 kg)   BMI 29.86 kg/m²       Assessment & Plan:  Diagnoses and all orders for this visit:    1. Seasonal allergic rhinitis due to pollen  Treat allergy symptoms. Call back if she develops flu-like symptoms. -     fluticasone propionate (FLONASE) 50 mcg/actuation nasal spray; 2 Sprays by Both Nostrils route daily. -     fexofenadine (ALLEGRA) 180 mg tablet; Take 1 Tab by mouth daily as needed for Allergies. 2. Pharyngitis, unspecified etiology  -     lidocaine (XYLOCAINE) 2 % solution; Take 15 mL by mouth every two (2) hours as needed for Pain. I have discussed the diagnosis with the patient and the intended plan as seen in the above orders. The patient has received an after-visit summary along with patient information handout. I have discussed medication side effects and warnings with the patient as well. Follow-up and Dispositions    · Return if symptoms worsen or fail to improve.            Deanna Mccarty, MAVERICK

## 2020-03-16 NOTE — LETTER
NOTIFICATION RETURN TO WORK / SCHOOL 
 
3/16/2020 10:02 AM 
 
Ms. Karson Chen [address] [place] [id number] To Whom It May Concern: 
 
Karson Chen is currently under the care of DAVY Umanzor. She will return to work/school on: 3/18/20 If there are questions or concerns please have the patient contact our office. Sincerely, Michelle Do NP

## 2020-03-16 NOTE — PROGRESS NOTES
Chief Complaint   Patient presents with    Sore Throat     itchy throat, runny nose, body aches, cough for 5 days was seen 3/12/2020 went to [facility] was given amoxicillin     Spoke to patient Identified pt with two pt identifiers (Name @ )    1. Have you been to the ER, urgent care clinic since your last visit? Hospitalized since your last visit? Yes [facility]    2. Have you seen or consulted any other health care providers outside of the [address] [place] [address] since your last visit? Include any pap smears or colon screening.  NO    \"REVIEWED RECORD IN PREPARATION FOR VISIT AND HAVE OBTAINED NECESSARY DOCUMENTATION\"

## 2020-05-15 ENCOUNTER — TELEPHONE (OUTPATIENT)
Dept: FAMILY MEDICINE CLINIC | Age: 45
End: 2020-05-15

## 2020-05-15 ENCOUNTER — DOCUMENTATION ONLY (OUTPATIENT)
Dept: FAMILY MEDICINE CLINIC | Age: 45
End: 2020-05-15

## 2020-05-15 ENCOUNTER — NURSE TRIAGE (OUTPATIENT)
Dept: OTHER | Facility: CLINIC | Age: 45
End: 2020-05-15

## 2020-05-15 NOTE — TELEPHONE ENCOUNTER
----- Message from Stephanieconner Musaopal sent at 5/15/2020  4:29 PM EDT -----  Regarding: Dr. Pineda Dunbar: [phone]  Caller:  Ellie Hashimoto -spouse    Reason for call: Whose call is being returned: Name unknown  Details to clarify the request: [facility] is unsure what the call regarding.      Returning your call

## 2020-05-15 NOTE — TELEPHONE ENCOUNTER
Reason for Disposition   All other patients with chest pain    Answer Assessment - Initial Assessment Questions  1. LOCATION: \"Where does it hurt? \"        Left side of chest   2. RADIATION: \"Does the pain go anywhere else? \" (e.g., into neck, jaw, arms, back)      No only chest  3. ONSET: \"When did the chest pain begin? \" (Minutes, hours or days)       2 or 3 days ago  4. PATTERN \"Does the pain come and go, or has it been constant since it started? \"  \"Does it get worse with exertion? \"       Intermittent   5. DURATION: \"How long does it last\" (e.g., seconds, minutes, hours)      When present , lasts 10 or 15 minutes. It is not present now. 6. SEVERITY: \"How bad is the pain? \"  (e.g., Scale 1-10; mild, moderate, or severe)     - MILD (1-3): doesn't interfere with normal activities      - MODERATE (4-7): interferes with normal activities or awakens from sleep     - SEVERE (8-10): excruciating pain, unable to do any normal activities        7/10  7. CARDIAC RISK FACTORS: \"Do you have any history of heart problems or risk factors for heart disease? \" (e.g., prior heart attack, angina; high blood pressure, diabetes, being overweight, high cholesterol, smoking, or strong family history of heart disease)      no  8. PULMONARY RISK FACTORS: \"Do you have any history of lung disease? \"  (e.g., blood clots in lung, asthma, emphysema, birth control pills)      no  9. CAUSE: \"What do you think is causing the chest pain? \"      Pt has pollen allergies . Unsure of cause. 10. OTHER SYMPTOMS: \"Do you have any other symptoms? \" (e.g., dizziness, nausea, vomiting, sweating, fever, difficulty breathing, cough)        Cough and itchy throat  11. PREGNANCY: \"Is there any chance you are pregnant? \" \"When was your last menstrual period? \"        no    Protocols used: CHEST PAIN-ADULT-[place]    Call from pod 7. Pt with symptoms as documented above. Pt informed of disposition. Soft trx to [facility] to set up VV.     Care advice as documented. Please do not respond to the triage nurse through this encounter. Any subsequent communication should be directly with the patient.

## 2020-05-18 ENCOUNTER — OFFICE VISIT (OUTPATIENT)
Dept: PRIMARY CARE CLINIC | Age: 45
End: 2020-05-18

## 2020-05-18 ENCOUNTER — VIRTUAL VISIT (OUTPATIENT)
Dept: FAMILY MEDICINE CLINIC | Age: 45
End: 2020-05-18

## 2020-05-18 DIAGNOSIS — M94.0 COSTOCHONDRITIS, ACUTE: Primary | ICD-10-CM

## 2020-05-18 DIAGNOSIS — R07.89 CHEST DISCOMFORT: ICD-10-CM

## 2020-05-18 DIAGNOSIS — J02.9 SORE THROAT: Primary | ICD-10-CM

## 2020-05-18 DIAGNOSIS — J06.9 URI, ACUTE: ICD-10-CM

## 2020-05-18 RX ORDER — NAPROXEN 375 MG/1
375 TABLET ORAL 2 TIMES DAILY WITH MEALS
Qty: 30 TAB | Refills: 0 | Status: SHIPPED | OUTPATIENT
Start: 2020-05-18 | End: 2020-07-30 | Stop reason: ALTCHOICE

## 2020-05-18 NOTE — PROGRESS NOTES
Jeaneen Rinne is a 39 y.o. female who was seen by synchronous (real-time) audio-video technology on 5/18/2020. Consent: Jeaneen Rinne, who was seen by synchronous (real-time) audio-video technology, and/or her healthcare decision maker, is aware that this patient-initiated, Telehealth encounter on 5/18/2020 is a billable service, with coverage as determined by her insurance carrier. She is aware that she may receive a bill and has provided verbal consent to proceed: Yes. Assessment & Plan:   Diagnoses and all orders for this visit:    1. Costochondritis, acute  -     naproxen (NAPROSYN) 375 mg tablet; Take 1 Tab by mouth two (2) times daily (with meals). 2. URI, acute    recommended to get COVID testing  Also discussed heating pad and course of costochondritis     I spent at least 23 minutes on this visit with this established patient. ([id number])    Subjective:   Jeaneen Rinne is a 39 y.o. female who was seen for Chest Pain and URI    Chest Wall Pain  Patient presents for presents evaluation of chest wall pain. Onset was 1 week ago. Pain description: character of chest pain: sharp, stabbing, shooting  location: costochondral region: upper, middle: left sided:  anterior chest wall: left sided  severity = moderate  course since onset: intermittent  denies shortness of breath, hemoptysis and anginal type chest pain. Mechanism of injury: none known, but she had cough and sore throat for almost few weeks. Previous visits for this problem: none. Evaluation to date: none  Treatment to date: none    She has h/o allergies and also she works in [address] [place] [address]. Concerned about COVID infection. Prior to Admission medications    Medication Sig Start Date End Date Taking? Authorizing Provider   naproxen (NAPROSYN) 375 mg tablet Take 1 Tab by mouth two (2) times daily (with meals). 5/18/20  Yes Meron River MD   fluticasone propionate (FLONASE) 50 mcg/actuation nasal spray 2 Sprays by Both Nostrils route daily. 3/16/20  Yes Alicia Pittman NP   fexofenadine (ALLEGRA) 180 mg tablet Take 1 Tab by mouth daily as needed for Allergies. 3/16/20  Yes Alicia Pittman NP   amLODIPine (NORVASC) 5 mg tablet Take 1 Tab by mouth nightly. 20  Yes Keesha Tillman MD   multivitamin (ONE A DAY) tablet Take 1 Tab by mouth daily. Yes Provider, Historical   acetaminophen (TYLENOL EXTRA STRENGTH) 500 mg tablet Take 2 Tabs by mouth every six (6) hours as needed for Pain. Yes Beltran Hagen MD     No Known Allergies    Patient Active Problem List    Diagnosis Date Noted    Hypokalemia 10/24/2019    HTN, goal below 140/90 2018    Immune to varicella     Brain lesion 2017    Migraine without status migrainosus, not intractable 2017    LTBI (latent tuberculosis infection) 2017     Current Outpatient Medications   Medication Sig Dispense Refill    naproxen (NAPROSYN) 375 mg tablet Take 1 Tab by mouth two (2) times daily (with meals). 30 Tab 0    fluticasone propionate (FLONASE) 50 mcg/actuation nasal spray 2 Sprays by Both Nostrils route daily. 1 Bottle 5    fexofenadine (ALLEGRA) 180 mg tablet Take 1 Tab by mouth daily as needed for Allergies. 30 Tab 5    amLODIPine (NORVASC) 5 mg tablet Take 1 Tab by mouth nightly. 90 Tab 1    multivitamin (ONE A DAY) tablet Take 1 Tab by mouth daily.  acetaminophen (TYLENOL EXTRA STRENGTH) 500 mg tablet Take 2 Tabs by mouth every six (6) hours as needed for Pain.        No Known Allergies  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    Migraine without status migrainosus, not intractable 3/24/2017     Past Surgical History:   Procedure Laterality Date    HX  SECTION      HX CYST REMOVAL Right 10/15/14    right hand    HX ORTHOPAEDIC  2014    GANGLION CYST REMOVED RIGHT HAND      Family History   Problem Relation Age of Onset    Diabetes Mother     Hypertension Mother     Stroke Father     Hypertension Father     No Known Problems Son     No Known Problems Brother     Anesth Problems Neg Hx     Heart Disease Neg Hx      Social History     Tobacco Use    Smoking status: Never Smoker    Smokeless tobacco: Never Used   Substance Use Topics    Alcohol use: Never     Frequency: Never       Review of Systems   Constitutional: Negative for chills, fever and malaise/fatigue. HENT: Negative for congestion, ear pain, sore throat and tinnitus. Eyes: Negative for blurred vision, double vision, pain and discharge. Respiratory: Positive for cough. Negative for shortness of breath and wheezing. Cardiovascular: Positive for chest pain. Negative for palpitations and leg swelling. Gastrointestinal: Negative for abdominal pain, blood in stool, constipation, diarrhea, nausea and vomiting. Genitourinary: Negative for dysuria, frequency, hematuria and urgency. Musculoskeletal: Negative for back pain, joint pain and myalgias. Skin: Negative for rash. Neurological: Negative for dizziness, tremors, seizures and headaches. Endo/Heme/Allergies: Negative for polydipsia. Does not bruise/bleed easily. Psychiatric/Behavioral: Negative for depression and substance abuse. The patient is not nervous/anxious. Objective:   Vital Signs: (As obtained by patient/caregiver at home)  There were no vitals taken for this visit.      [INSTRUCTIONS:  \"[x]\" Indicates a positive item  \"[]\" Indicates a negative item  -- DELETE ALL ITEMS NOT EXAMINED]    Constitutional: [x] Appears well-developed and well-nourished [x] No apparent distress      [] Abnormal -     Mental status: [x] Alert and awake  [x] Oriented to person/place/time [x] Able to follow commands    [] Abnormal -     Eyes:   EOM    [x]  Normal    [] Abnormal - Sclera  [x]  Normal    [] Abnormal -          Discharge [x]  None visible   [] Abnormal -     HENT: [x] Normocephalic, atraumatic  [] Abnormal -   [x] Mouth/Throat: Mucous membranes are moist    External Ears [x] Normal  [] Abnormal -    Neck: [x] No visualized mass [] Abnormal -     Pulmonary/Chest: [x] Respiratory effort normal   [x] No visualized signs of difficulty breathing or respiratory distress        [] Abnormal - showing left side upper and middle costochondral area where it hurts. Pressure on area reproducing pain     Musculoskeletal:   [x] Normal gait with no signs of ataxia         [x] Normal range of motion of neck        [] Abnormal -     Neurological:        [x] No Facial Asymmetry (Cranial nerve 7 motor function) (limited exam due to video visit)          [x] No gaze palsy        [] Abnormal -          Skin:        [x] No significant exanthematous lesions or discoloration noted on facial skin         [] Abnormal -            Psychiatric:       [x] Normal Affect [] Abnormal -        [x] No Hallucinations    Other pertinent observable physical exam findings:-        We discussed the expected course, resolution and complications of the diagnosis(es) in detail. Medication risks, benefits, costs, interactions, and alternatives were discussed as indicated. I advised her to contact the office if her condition worsens, changes or fails to improve as anticipated. She expressed understanding with the diagnosis(es) and plan. Los Javier is a 39 y.o. female who was evaluated by a video visit encounter for concerns as above. Patient identification was verified prior to start of the visit. A caregiver was present when appropriate. Due to this being a TeleHealth encounter (During DYF-56 public health emergency), evaluation of the following organ systems was limited: Vitals/Constitutional/EENT/Resp/CV/GI//MS/Neuro/Skin/Heme-Lymph-Imm.   Pursuant to the emergency declaration under the [address] and the [employer] Act, [facility] waiver authority and the Coronavirus Preparedness and [employer] Act, this Virtual  Visit was conducted, with patient's (and/or legal guardian's) consent, to reduce the patient's risk of exposure to COVID-19 and provide necessary medical care. Services were provided through a video synchronous discussion virtually to substitute for in-person clinic visit.    This service was provided through telehealth, between patient Beataalliegin Del Cid participating from home and Lor Nichole MD from [address] [place] [address]

## 2020-05-18 NOTE — PROGRESS NOTES
[facility]  Flu Clinic Note      Subjective:   Phoebe Curry is a 39y.o. year old female who presents to flu clinic for evaluation of the following:    See scanned note for details. Upper respiratory symptoms. Upper Respiratory Symptoms:  Onset: 5 days ago  Sore throat, cough, and chest discomfort. Described sharp pain in chest that is intermittent. States no current symptom sat this moment. 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 Denies known exposure to [employer] [address] [employer] [address]      Review of Systems   Pertinent positives and negative per HPI. All other systems  reviewed are negative for a Comprehensive ROS (10+).        Past Medical History:   Diagnosis Date    Brain lesion 3/24/2017    GERD (gastroesophageal reflux disease)     HTN, goal below 140/90 3/8/2018    Hypokalemia 10/24/2019    Immune to varicella     serology confirmed 6/24/14    LTBI (latent tuberculosis infection) 3/24/2017    Migraine without status migrainosus, not intractable 3/24/2017        Social History     Socioeconomic History    Marital status:      Spouse name: Not on file    Number of children: Not on file    Years of education: Not on file    Highest education level: Not on file   Occupational History    Not on file   Social Needs    Financial resource strain: Not on file    Food insecurity     Worry: Not on file     Inability: Not on file   [employer] needs     Medical: Not on file     Non-medical: Not on file   Tobacco Use    Smoking status: Never Smoker    Smokeless tobacco: Never Used   Substance and Sexual Activity    Alcohol use: Never     Frequency: Never    Drug use: No    Sexual activity: Yes     Partners: Male     Birth control/protection: None   Lifestyle    Physical activity     Days per week: Not on file     Minutes per session: Not on file    Stress: Not on file   Relationships    Social connections     Talks on phone: Not on file     Gets together: Not on file Attends Gnosticist service: Not on file     Active member of club or organization: Not on file     Attends meetings of clubs or organizations: Not on file     Relationship status: Not on file    Intimate partner violence     Fear of current or ex partner: Not on file     Emotionally abused: Not on file     Physically abused: Not on file     Forced sexual activity: Not on file   Other Topics Concern    Not on file   Social History Narrative    Not on file           Objective:   See scanned note for vitals. Physical Examination:  General: Alert, cooperative, no distress, appears stated age. Eyes: Conjunctivae clear. Pupils equally round. Extraocular muscles intact. Ears: Normal appearing external ear   Nose: Nares normal appearing  Mouth/Throat: Lips, mucosa, and tongue normal. Moist mucous membranes. No tonsillar enlargement noted. Neck: Supple, symmetrical, trachea midline, no neck mass visualized. Respiratory: Breathing comfortably, in no acute respiratory distress. Lungs clear to auscultation bilaterally. Cardiovascular: Visualized extremities without edema. Heart rate and rhythm regular   MSK: Upper extremities normal appearing. Skin: Skin color, texture, turgor normal. No rashes or lesions on exposed skin. Neurologic: No facial asymmetry. Normal gaze  Psychiatric: Affect appropriate. Thoughts logical. Speech volume and speed normal. No hallucinations. Well kempt. Office Visit on 03/12/2020   Component Date Value Ref Range Status    VALID INTERNAL CONTROL POC 03/12/2020 Yes   Final    Group A Strep Ag 03/12/2020 Negative  Negative Final         Assessment/ Plan:   Diagnoses and all orders for this visit:    1. Sore throat    2. Chest discomfort        Mild developing URI vs allergies vs GERD. No SIRS. Add tums and otc analgesia for symptoms management.     Trial of otc meds for symptom relief discussed and listed in patient instructions- nasal steroid + mucinex + antihistamine + sinus rinse + otc analgesia + humidifier prn  COVID19 test done  Unable to rule out COVID-19 as a diagnosis. Advised patient self quarantine. Reviewed symptoms monitoring, social distancing and hygiene during self quarantine.   - work note provided for excuse until results return. Educated patient on red flag symptoms to warrant return to clinic or emergency room visit. I have discussed the diagnosis with the patient and the intended plan as seen in the above orders. The patient has been offered or received an after-visit summary and questions were answered concerning future plans. I have discussed medication side effects and warnings with the patient as well. Follow-up and Dispositions    · Return if symptoms worsen or fail to improve.           Signed,    Vasiliy Pretty MD  5/18/2020

## 2020-05-18 NOTE — PATIENT INSTRUCTIONS
Learning About Coronavirus [phone])  Coronavirus [phone]): Overview  What is coronavirus (COVID-19)? The coronavirus disease (COVID-19) is caused by a virus. It is an illness that was first found in [place], [place], in December 2019. It has since spread worldwide. The virus can cause fever, cough, and trouble breathing. In severe cases, it can cause pneumonia and make it hard to breathe without help. It can cause death. Coronaviruses are a large group of viruses. They cause the common cold. They also cause more serious illnesses like Middle East respiratory syndrome (MERS) and severe acute respiratory syndrome (SARS). COVID-19 is caused by a novel coronavirus. That means it's a new type that has not been seen in people before. This virus spreads person-to-person through droplets from coughing and sneezing. It can also spread when you are close to someone who is infected. And it can spread when you touch something that has the virus on it, such as a doorknob or a tabletop. What can you do to protect yourself from coronavirus (COVID-19)? The best way to protect yourself from getting sick is to:  · Avoid areas where there is an outbreak. · Avoid contact with people who may be infected. · Wash your hands often with soap or alcohol-based hand sanitizers. · Avoid crowds and try to stay at least 6 feet away from other people. · Wash your hands often, especially after you cough or sneeze. Use soap and water, and scrub for at least 20 seconds. If soap and water aren't available, use an alcohol-based hand . · Avoid touching your mouth, nose, and eyes. What can you do to avoid spreading the virus to others? To help avoid spreading the virus to others:  · Cover your mouth with a tissue when you cough or sneeze. Then throw the tissue in the trash. · Use a disinfectant to clean things that you touch often. · Stay home if you are sick or have been exposed to the virus.  Don't go to school, work, or public areas. And don't use public transportation. · If you are sick:  ? Leave your home only if you need to get medical care. But call the doctor's office first so they know you're coming. And wear a face mask, if you have one.  ? If you have a face mask, wear it whenever you're around other people. It can help stop the spread of the virus when you cough or sneeze. ? Clean and disinfect your home every day. Use household  and disinfectant wipes or sprays. Take special care to clean things that you grab with your hands. These include doorknobs, remote controls, phones, and handles on your refrigerator and microwave. And don't forget countertops, tabletops, bathrooms, and computer keyboards. When to call for help  Call 911 anytime you think you may need emergency care. For example, call if:  · You have severe trouble breathing. (You can't talk at all.)  · You have constant chest pain or pressure. · You are severely dizzy or lightheaded. · You are confused or can't think clearly. · Your face and lips have a blue color. · You pass out (lose consciousness) or are very hard to wake up. Call your doctor now if you develop symptoms such as:  · Shortness of breath. · Fever. · Cough. If you need to get care, call ahead to the doctor's office for instructions before you go. Make sure you wear a face mask, if you have one, to prevent exposing other people to the virus. Where can you get the latest information? The following health organizations are tracking and studying this virus. Their websites contain the most up-to-date information. Coleman Chambers also learn what to do if you think you may have been exposed to the virus. · U.S. Centers for Disease Control and Prevention (CDC): The CDC provides updated news about the disease and travel advice. The website also tells you how to prevent the spread of infection. www.cdc.gov  · World Health Organization [facility]): WHO offers information about the virus outbreaks.  WHO also has travel advice. www.who.int  Current as of: April 1, 2020               Content Version: 12.4  © 6830-4670 Healthwise, [employer]orporated. Care instructions adapted under license by your healthcare professional. If you have questions about a medical condition or this instruction, always ask your healthcare professional. [address] any warranty or liability for your use of this information.

## 2020-05-18 NOTE — TELEPHONE ENCOUNTER
Called, spoke to pt. Two pt identifiers confirmed. Writer lowell virtual appointment. DX: Chest Pain Cough. Pt verbalized understanding of information discussed w/ no further questions at this time.

## 2020-05-20 LAB — SARS-COV-2, NAA: NOT DETECTED

## 2020-05-20 NOTE — PROGRESS NOTES
Outbound call placed to patient. Name and  verified. Patient reported understanding and appreciaitve of call.

## 2020-07-30 ENCOUNTER — OFFICE VISIT (OUTPATIENT)
Dept: FAMILY MEDICINE CLINIC | Age: 45
End: 2020-07-30

## 2020-07-30 VITALS
HEART RATE: 94 BPM | BODY MASS INDEX: 29.34 KG/M2 | DIASTOLIC BLOOD PRESSURE: 77 MMHG | HEIGHT: 57 IN | WEIGHT: 136 LBS | SYSTOLIC BLOOD PRESSURE: 136 MMHG | TEMPERATURE: 98.5 F | OXYGEN SATURATION: 96 %

## 2020-07-30 DIAGNOSIS — I10 HTN, GOAL BELOW 140/90: Primary | ICD-10-CM

## 2020-07-30 NOTE — PATIENT INSTRUCTIONS
DASH Diet: Care Instructions Your Care Instructions The DASH diet is an eating plan that can help lower your blood pressure. DASH stands for Dietary Approaches to Stop Hypertension. Hypertension is high blood pressure. The DASH diet focuses on eating foods that are high in calcium, potassium, and magnesium. These nutrients can lower blood pressure. The foods that are highest in these nutrients are fruits, vegetables, low-fat dairy products, nuts, seeds, and legumes. But taking calcium, potassium, and magnesium supplements instead of eating foods that are high in those nutrients does not have the same effect. The DASH diet also includes whole grains, fish, and poultry. The DASH diet is one of several lifestyle changes your doctor may recommend to lower your high blood pressure. Your doctor may also want you to decrease the amount of sodium in your diet. Lowering sodium while following the DASH diet can lower blood pressure even further than just the DASH diet alone. Follow-up care is a key part of your treatment and safety. Be sure to make and go to all appointments, and call your doctor if you are having problems. It's also a good idea to know your test results and keep a list of the medicines you take. How can you care for yourself at home? Following the DASH diet · Eat 4 to 5 servings of fruit each day. A serving is 1 medium-sized piece of fruit, ½ cup chopped or canned fruit, 1/4 cup dried fruit, or 4 ounces (½ cup) of fruit juice. Choose fruit more often than fruit juice. · Eat 4 to 5 servings of vegetables each day. A serving is 1 cup of lettuce or raw leafy vegetables, ½ cup of chopped or cooked vegetables, or 4 ounces (½ cup) of vegetable juice. Choose vegetables more often than vegetable juice. · Get 2 to 3 servings of low-fat and fat-free dairy each day. A serving is 8 ounces of milk, 1 cup of yogurt, or 1 ½ ounces of cheese. · Eat 6 to 8 servings of grains each day. A serving is 1 slice of bread, 1 ounce of dry cereal, or ½ cup of cooked rice, pasta, or cooked cereal. Try to choose whole-grain products as much as possible. · Limit lean meat, poultry, and fish to 2 servings each day. A serving is 3 ounces, about the size of a deck of cards. · Eat 4 to 5 servings of nuts, seeds, and legumes (cooked dried beans, lentils, and split peas) each week. A serving is 1/3 cup of nuts, 2 tablespoons of seeds, or ½ cup of cooked beans or peas. · Limit fats and oils to 2 to 3 servings each day. A serving is 1 teaspoon of vegetable oil or 2 tablespoons of salad dressing. · Limit sweets and added sugars to 5 servings or less a week. A serving is 1 tablespoon jelly or jam, ½ cup sorbet, or 1 cup of lemonade. · Eat less than 2,300 milligrams (mg) of sodium a day. If you limit your sodium to 1,500 mg a day, you can lower your blood pressure even more. Tips for success · Start small. Do not try to make dramatic changes to your diet all at once. You might feel that you are missing out on your favorite foods and then be more likely to not follow the plan. Make small changes, and stick with them. Once those changes become habit, add a few more changes. · Try some of the following: ? Make it a goal to eat a fruit or vegetable at every meal and at snacks. This will make it easy to get the recommended amount of fruits and vegetables each day. ? Try yogurt topped with fruit and nuts for a snack or healthy dessert. ? Add lettuce, tomato, cucumber, and onion to sandwiches. ? Combine a ready-made pizza crust with low-fat mozzarella cheese and lots of vegetable toppings. Try using tomatoes, squash, spinach, broccoli, carrots, cauliflower, and onions. ? Have a variety of cut-up vegetables with a low-fat dip as an appetizer instead of chips and dip. ? Sprinkle sunflower seeds or chopped almonds over salads.  Or try adding chopped walnuts or almonds to cooked vegetables. ? Try some vegetarian meals using beans and peas. Add garbanzo or kidney beans to salads. Make burritos and tacos with mashed alvares beans or black beans. Where can you learn more? Go to http://gómez-william.info/ Enter P570 in the search box to learn more about \"DASH Diet: Care Instructions. \" Current as of: December 16, 2019               Content Version: 12.5 © 7298-7378 [employer]. Care instructions adapted under license by [employer] (which disclaims liability or warranty for this information). If you have questions about a medical condition or this instruction, always ask your healthcare professional. [address] any warranty or liability for your use of this information.

## 2020-07-30 NOTE — PROGRESS NOTES
Chief Complaint   Patient presents with    Hypertension     6 month follow up. 1. Have you been to the ER, urgent care clinic since your last visit? Hospitalized since your last visit? No    2. Have you seen or consulted any other health care providers outside of the [facility] since your last visit? Include any pap smears or colon screening.  No

## 2020-07-30 NOTE — PROGRESS NOTES
HISTORY OF PRESENT ILLNESS  Herber Li is a 39 y.o. female. seen for 6 months follow up on HTN  HPI  Cardiovascular Review  The patient has hypertension. She reports taking medications as instructed, no medication side effects noted, home BP monitoring in range of 130s/80s's  no chest pain on exertion, no dyspnea on exertion, no swelling of ankles, no orthostatic dizziness or lightheadedness, no orthopnea or paroxysmal nocturnal dyspnea, no palpitations, no intermittent claudication symptoms, no muscle aches or pain. Diet and Lifestyle: generally follows a low fat low cholesterol diet, generally follows a low sodium diet, exercises regularly, nonsmoker. Lab review: labs reviewed and discussed with patient. Medicines: Amlodipine 5 mg        Review of Systems   Constitutional: Negative for chills, fever and malaise/fatigue. HENT: Negative for congestion, ear pain, sore throat and tinnitus. Eyes: Negative for blurred vision, double vision, pain and discharge. Respiratory: Negative for cough, shortness of breath and wheezing. Cardiovascular: Negative for chest pain, palpitations and leg swelling. Gastrointestinal: Negative for abdominal pain, blood in stool, constipation, diarrhea, nausea and vomiting. Genitourinary: Negative for dysuria, frequency, hematuria and urgency. Musculoskeletal: Negative for back pain, joint pain and myalgias. Skin: Negative for rash. Neurological: Negative for dizziness, tremors, seizures and headaches. Endo/Heme/Allergies: Negative for polydipsia. Does not bruise/bleed easily. Psychiatric/Behavioral: Negative for depression and substance abuse. The patient is not nervous/anxious. Physical Exam  Vitals signs and nursing note reviewed. Constitutional:       Appearance: She is well-developed. She is not diaphoretic. HENT:      Head: Normocephalic and atraumatic.       Right Ear: External ear normal.      Mouth/Throat:      Pharynx: No oropharyngeal exudate. Eyes:      General: No scleral icterus. Conjunctiva/sclera: Conjunctivae normal.      Pupils: Pupils are equal, round, and reactive to light. Neck:      Musculoskeletal: Normal range of motion and neck supple. Thyroid: No thyromegaly. Vascular: No JVD. Cardiovascular:      Rate and Rhythm: Normal rate and regular rhythm. Heart sounds: Normal heart sounds. No murmur. Pulmonary:      Effort: Pulmonary effort is normal.      Breath sounds: Normal breath sounds. No wheezing. Abdominal:      General: Bowel sounds are normal. There is no distension. Palpations: Abdomen is soft. There is no mass. Musculoskeletal: Normal range of motion. General: No tenderness. Lymphadenopathy:      Cervical: No cervical adenopathy. Skin:     General: Skin is warm and dry. Findings: No rash. Neurological:      Mental Status: She is alert and oriented to person, place, and time. Cranial Nerves: No cranial nerve deficit. Deep Tendon Reflexes: Reflexes are normal and symmetric. Reflexes normal.         ASSESSMENT and PLAN  Diagnoses and all orders for this visit:    1. HTN, goal below 003/02  -     METABOLIC PANEL, BASIC    Discussed sodium restriction, maintaining ideal body weight and regular exercise program as physiologic means to achieve blood pressure control. The patient will strive towards this. Meanwhile, it is appropriate to lower BP with medications, while observing for therapeutic effect and if appropriate later, can discontinue medications if physiologic methods appear to be effective. The patient indicates understanding of these issues and agrees with the plan. The various types of antihypertensives are discussed fully. See orders for this visit as documented in the electronic medical record. Side effects explained in detail. Continue home readings and see me for followup as scheduled.    Discussed lifestyle issues and health guidance given  Patient was given an after visit summary which includes diagnoses, vital signs, current medications, instructions and references & authorized prescriptions . Results of labs will be conveyed to patient, once available. Pt verbalized instructions I provided and expressed understanding of discussion that was held today. Follow-up and Dispositions    · Return in about 6 months (around 1/30/2021) for fasting.

## 2020-07-31 LAB
BUN SERPL-MCNC: 9 MG/DL (ref 6–24)
BUN/CREAT SERPL: 22 (ref 9–23)
CALCIUM SERPL-MCNC: 8.9 MG/DL (ref 8.7–10.2)
CHLORIDE SERPL-SCNC: 102 MMOL/L (ref 96–106)
CO2 SERPL-SCNC: 21 MMOL/L (ref 20–29)
CREAT SERPL-MCNC: 0.41 MG/DL (ref 0.57–1)
GLUCOSE SERPL-MCNC: 118 MG/DL (ref 65–99)
POTASSIUM SERPL-SCNC: 3.4 MMOL/L (ref 3.5–5.2)
SODIUM SERPL-SCNC: 139 MMOL/L (ref 134–144)

## 2020-07-31 NOTE — PROGRESS NOTES
Zion Macedo,  Your kidney function is normal but Potassium is still low  Amlodipine doesn't have any effect on Potassium, so you need to eat food rich in Potassium   Milk and other dairy products. Vegetables, especially broccoli, cooked dry beans, tomatoes, potatoes, artichokes, winter squash, and spinach. Fruits, especially citrus fruits, bananas, and apricots. Dried apricots contain more potassium than fresh apricots. Meat, poultry, and fish. Let me know if you have any questions.   thanks

## 2020-08-08 DIAGNOSIS — I10 HTN, GOAL BELOW 140/90: ICD-10-CM

## 2020-08-09 RX ORDER — AMLODIPINE BESYLATE 5 MG/1
TABLET ORAL
Qty: 90 TAB | Refills: 0 | Status: SHIPPED | OUTPATIENT
Start: 2020-08-09 | End: 2020-11-04 | Stop reason: SDUPTHER

## 2020-08-18 DIAGNOSIS — Z11.1 TUBERCULOSIS SCREENING: Primary | ICD-10-CM

## 2020-11-04 DIAGNOSIS — I10 HTN, GOAL BELOW 140/90: ICD-10-CM

## 2020-11-04 RX ORDER — AMLODIPINE BESYLATE 5 MG/1
TABLET ORAL
Qty: 90 TAB | Refills: 0 | Status: SHIPPED | OUTPATIENT
Start: 2020-11-04 | End: 2020-11-09 | Stop reason: SDUPTHER

## 2020-11-04 NOTE — TELEPHONE ENCOUNTER
Last visit 07/30/2020 MD Jennifer Best   Next appointment 6 months (01/2021)   Previous refill encounter(s)   08/09/2020 Norvasc #90     Requested Prescriptions     Pending Prescriptions Disp Refills    amLODIPine (NORVASC) 5 mg tablet 90 Tab 0

## 2020-11-09 RX ORDER — AMLODIPINE BESYLATE 5 MG/1
TABLET ORAL
Qty: 60 TAB | Refills: 0 | Status: SHIPPED | OUTPATIENT
Start: 2020-11-09 | End: 2021-04-12

## 2020-11-11 ENCOUNTER — TRANSCRIBE ORDER (OUTPATIENT)
Dept: SCHEDULING | Age: 45
End: 2020-11-11

## 2020-11-11 DIAGNOSIS — Z12.31 OTHER SCREENING MAMMOGRAM: Primary | ICD-10-CM

## 2020-11-19 ENCOUNTER — OFFICE VISIT (OUTPATIENT)
Dept: FAMILY MEDICINE CLINIC | Age: 45
End: 2020-11-19
Payer: COMMERCIAL

## 2020-11-19 VITALS
HEART RATE: 77 BPM | DIASTOLIC BLOOD PRESSURE: 82 MMHG | HEIGHT: 57 IN | SYSTOLIC BLOOD PRESSURE: 124 MMHG | RESPIRATION RATE: 18 BRPM | WEIGHT: 142 LBS | BODY MASS INDEX: 30.63 KG/M2 | OXYGEN SATURATION: 98 % | TEMPERATURE: 97.8 F

## 2020-11-19 DIAGNOSIS — B37.2 CANDIDIASIS, INTERTRIGO: ICD-10-CM

## 2020-11-19 DIAGNOSIS — E88.81 INSULIN RESISTANCE SYNDROME: Primary | ICD-10-CM

## 2020-11-19 DIAGNOSIS — M25.561 PATELLOFEMORAL ARTHRALGIA OF RIGHT KNEE: ICD-10-CM

## 2020-11-19 DIAGNOSIS — M72.2 PLANTAR FASCIITIS OF LEFT FOOT: ICD-10-CM

## 2020-11-19 PROCEDURE — 99214 OFFICE O/P EST MOD 30 MIN: CPT | Performed by: FAMILY MEDICINE

## 2020-11-19 RX ORDER — NAPROXEN 375 MG/1
375 TABLET ORAL
Qty: 60 TAB | Refills: 0 | Status: SHIPPED | OUTPATIENT
Start: 2020-11-19 | End: 2021-09-01 | Stop reason: ALTCHOICE

## 2020-11-19 RX ORDER — CLOTRIMAZOLE AND BETAMETHASONE DIPROPIONATE 10; .64 MG/G; MG/G
CREAM TOPICAL
Qty: 15 G | Refills: 0 | Status: SHIPPED | OUTPATIENT
Start: 2020-11-19 | End: 2021-09-01 | Stop reason: ALTCHOICE

## 2020-11-19 RX ORDER — METFORMIN HYDROCHLORIDE 500 MG/1
500 TABLET, EXTENDED RELEASE ORAL
Qty: 90 TAB | Refills: 1 | Status: SHIPPED | OUTPATIENT
Start: 2020-11-19 | End: 2021-03-04 | Stop reason: SDUPTHER

## 2020-11-19 NOTE — PATIENT INSTRUCTIONS
Plantar Fasciitis: Exercises Introduction Here are some examples of exercises for you to try. The exercises may be suggested for a condition or for rehabilitation. Start each exercise slowly. Ease off the exercises if you start to have pain. You will be told when to start these exercises and which ones will work best for you. How to do the exercises Towel stretch 1. Sit with your legs extended and knees straight. 2. Place a towel around your foot just under the toes. 3. Hold each end of the towel in each hand, with your hands above your knees. 4. Pull back with the towel so that your foot stretches toward you. 5. Hold the position for at least 15 to 30 seconds. 6. Repeat 2 to 4 times a session, up to 5 sessions a day. Calf stretch This exercise stretches the muscles at the back of the lower leg (the calf) and the Achilles tendon. Do this exercise 3 or 4 times a day, 5 days a week. 1. Stand facing a wall with your hands on the wall at about eye level. Put the leg you want to stretch about a step behind your other leg. 2. Keeping your back heel on the floor, bend your front knee until you feel a stretch in the back leg. 3. Hold the stretch for 15 to 30 seconds. Repeat 2 to 4 times. Plantar fascia and calf stretch Stretching the plantar fascia and calf muscles can increase flexibility and decrease heel pain. You can do this exercise several times each day and before and after activity. 1. Stand on a step as shown above. Be sure to hold on to the banister. 2. Slowly let your heels down over the edge of the step as you relax your calf muscles. You should feel a gentle stretch across the bottom of your foot and up the back of your leg to your knee. 3. Hold the stretch about 15 to 30 seconds, and then tighten your calf muscle a little to bring your heel back up to the level of the step. Repeat 2 to 4 times. Towel curls Make this exercise more challenging by placing a weighted object, such as a soup can, on the other end of the towel. 1. While sitting, place your foot on a towel on the floor and scrunch the towel toward you with your toes. 2. Then, also using your toes, push the towel away from you. [place] pickups 1. Put marbles on the floor next to a cup. 
2. Using your toes, try to lift the marbles up from the floor and put them in the cup. Follow-up care is a key part of your treatment and safety. Be sure to make and go to all appointments, and call your doctor if you are having problems. It's also a good idea to know your test results and keep a list of the medicines you take. Where can you learn more? Go to http://www.[employer]/ Anam Banerjee in the search box to learn more about \"Plantar Fasciitis: Exercises. \" Current as of: March 2, 2020               Content Version: 12.6 © 2006-2020 [employer]. Care instructions adapted under license by [employer] (which disclaims liability or warranty for this information). If you have questions about a medical condition or this instruction, always ask your healthcare professional. [address] any warranty or liability for your use of this information.

## 2020-11-19 NOTE — PROGRESS NOTES
Chief Complaint   Patient presents with    Abdominal Pain    Knee Pain     Right knee pain     1. Have you been to the ER, urgent care clinic since your last visit? Hospitalized since your last visit? No    2. Have you seen or consulted any other health care providers outside of the [address] [place] [address] since your last visit? Include any pap smears or colon screening.  No

## 2020-11-19 NOTE — PROGRESS NOTES
HISTORY OF PRESENT ILLNESS  Deborryogesh Kevin is a 39 y.o. female. Seen today for right knee pain, left ankle pain and failure to loose weight with diet and exercise. HPI  Knee Pain  Patient complains of right knee pain. Onset of the symptoms was several weeks ago. Inciting event: associated with running program. Current symptoms include pain location: right sided: anterior, medial, severity of pain = moderate, swelling: mild, knee gives out and knee locks. Associated symptoms: none. Aggravating symptoms: going up and down stairs, walking, running, rising after sitting. Patient's course of pain: symptoms have progressed to a point and plateaued. Patient has had no prior knee problems. Patient denies weight loss, melena, fever, previous knee surgery. Evaluation to date: none. Treatment to date: none. Ankle Pain  Patient complains of left ankle pain. Onset of the symptoms was several days ago. Inciting event: none known. Current symptoms include ability to bear weight, but with some pain, pain at the posterior and dorsal aspect of the ankle and worsening symptoms after a period of inactivity. Aggravating symptoms: rising after sitting, inactivity. Patient's course of pain: stable and intermittent. Patient has had no prior ankle problems. Previous visits for this problem: none. Evaluation to date: none. Treatment to date: none. As per her, she is working hard on diet and exercise but still having hard time to loose weight. Her periods have been delayed and irregular too,recently. PCOS:  Menses have usually been irregular since menarche which was at age 14. She is bleeding every 45 to 60  days and menses are lasting up to 1 week   Dysmenorrhea: mild, occurring first 1-2 days of flow.    Additional symptoms include intermittent prolonged amenorrhea, hair loss, problems controlling her weight and weight gain  She denies hirsutism, acne, changes in libido, pelvic pain and skin darkening in the axillary regions  History of infertility: no.   Prior Pap smear:was normal.  Sexual history: single partner,     Relevant PMH: hypertension      Review of Systems   Constitutional: Negative for chills, fever and malaise/fatigue. HENT: Negative for congestion, ear pain, sore throat and tinnitus. Eyes: Negative for blurred vision, double vision, pain and discharge. Respiratory: Negative for cough, shortness of breath and wheezing. Cardiovascular: Negative for chest pain, palpitations and leg swelling. Gastrointestinal: Negative for abdominal pain, blood in stool, constipation, diarrhea, nausea and vomiting. Genitourinary: Negative for dysuria, frequency, hematuria and urgency. Delayed irregular period   Musculoskeletal: Negative for back pain, joint pain and myalgias. Right knee and left ankle pain   Skin: Positive for itching and rash (in both groins). Neurological: Negative for dizziness, tremors, seizures and headaches. Endo/Heme/Allergies: Negative for polydipsia. Does not bruise/bleed easily. Psychiatric/Behavioral: Negative for depression and substance abuse. The patient is not nervous/anxious. Physical Exam  Vitals signs and nursing note reviewed. Constitutional:       Appearance: She is well-developed. She is not diaphoretic. HENT:      Head: Normocephalic and atraumatic. Right Ear: External ear normal.      Mouth/Throat:      Pharynx: No oropharyngeal exudate. Eyes:      General: No scleral icterus. Conjunctiva/sclera: Conjunctivae normal.      Pupils: Pupils are equal, round, and reactive to light. Neck:      Musculoskeletal: Normal range of motion and neck supple. Thyroid: No thyromegaly. Vascular: No JVD. Cardiovascular:      Rate and Rhythm: Normal rate and regular rhythm. Heart sounds: Normal heart sounds. No murmur. Pulmonary:      Effort: Pulmonary effort is normal.      Breath sounds: Normal breath sounds. No wheezing.    Abdominal: General: Bowel sounds are normal. There is no distension. Palpations: Abdomen is soft. There is no mass. Musculoskeletal: Normal range of motion. Right knee: She exhibits normal range of motion, no swelling, no effusion, no bony tenderness, normal meniscus and no MCL laxity. Tenderness found. Medial joint line tenderness noted. Left ankle: She exhibits normal range of motion and no swelling. Achilles tendon exhibits pain. Achilles tendon exhibits no defect and normal Chinchilla's test results. Feet:    Lymphadenopathy:      Cervical: No cervical adenopathy. Skin:     General: Skin is warm and dry. Findings: No rash. Neurological:      Mental Status: She is alert and oriented to person, place, and time. Cranial Nerves: No cranial nerve deficit. Deep Tendon Reflexes: Reflexes are normal and symmetric. Reflexes normal.         ASSESSMENT and PLAN  Diagnoses and all orders for this visit:    1. Insulin resistance syndrome  -     metFORMIN ER (GLUCOPHAGE XR) 500 mg tablet; Take 1 Tab by mouth daily (with dinner). 2. Patellofemoral arthralgia of right knee  -     naproxen (NAPROSYN) 375 mg tablet; Take 1 Tab by mouth two (2) times daily as needed for Pain. 3. Plantar fasciitis of left foot  -     naproxen (NAPROSYN) 375 mg tablet; Take 1 Tab by mouth two (2) times daily as needed for Pain. 4. Candidiasis, intertrigo  -     clotrimazole-betamethasone (LOTRISONE) topical cream; Apply over rash every night    Discussed lifestyle issues and health guidance given  Patient was given an after visit summary which includes diagnoses, vital signs, current medications, instructions and references & authorized prescriptions . Results of labs will be conveyed to patient, once available. Pt verbalized instructions I provided and expressed understanding of discussion that was held today.

## 2020-12-08 ENCOUNTER — TELEPHONE (OUTPATIENT)
Dept: FAMILY MEDICINE CLINIC | Age: 45
End: 2020-12-08

## 2020-12-08 NOTE — TELEPHONE ENCOUNTER
----- Message from Lucy Thorne sent at 12/7/2020  7:43 AM EST -----  Regarding: Dr. Micky Kelly first and last name:  Reason for call: Patient needs chest x ray from Oct 1 2016 results faxed to [phone] for Maria Eugenia Segura at [facility]  Callback required yes/no and why:yes   Best contact number(s):[phone]  Details to clarify the request:  Job is requesting it

## 2020-12-08 NOTE — TELEPHONE ENCOUNTER
Outbound call to patient,[phone];verified , advised patient we no longer have the requested xray in this office, She will contact [facility] [place]      BCB#[phone]

## 2020-12-09 NOTE — TELEPHONE ENCOUNTER
Spoke with patient 12/08/2020 advised that we do not have the xray at this location.  Unable to find information regarding a recent tb test.        KXE#[phone]

## 2020-12-09 NOTE — TELEPHONE ENCOUNTER
----- Message from Patti Kennedy sent at 12/9/2020 11:39 AM EST -----  Regarding: Dr. Cosme Rebuck / telephone  General Message/Vendor Calls    Caller's first and last name:     Madeline Rosa (Spouse)    Reason for call:  Request for copy of TB Xray      Callback required yes/no and why:  yes to confirm       Best contact number(s):  [phone]       Details to clarify the request:   per  stated TB test x-ray was done at [facility] office and employer request a copy of results. Please send to [address] [facility] Attn:Ingrid Chinchilla  fax# [phone]. If unable to provide copy patient will need to retake test for employers record.       Zenia Campo

## 2020-12-11 NOTE — TELEPHONE ENCOUNTER
Patient was called, informed that the TB ( skin test ) has never been completed ( none noted on file )  and the last chest xray on file was done in 8/2016. Informed that Dr. Crawford Runner did order a chest xray in 8/2020 and that she could go to a [address] to complete ( [facility] ). Patient stated understanding and will complete xray, no other questions at time of call.  Gibran Pelletier LPN

## 2021-03-04 ENCOUNTER — OFFICE VISIT (OUTPATIENT)
Dept: FAMILY MEDICINE CLINIC | Age: 46
End: 2021-03-04
Payer: COMMERCIAL

## 2021-03-04 VITALS
DIASTOLIC BLOOD PRESSURE: 80 MMHG | SYSTOLIC BLOOD PRESSURE: 120 MMHG | WEIGHT: 135.6 LBS | RESPIRATION RATE: 18 BRPM | HEART RATE: 86 BPM | OXYGEN SATURATION: 98 % | TEMPERATURE: 98.2 F | BODY MASS INDEX: 29.26 KG/M2 | HEIGHT: 57 IN

## 2021-03-04 DIAGNOSIS — E88.81 INSULIN RESISTANCE SYNDROME: ICD-10-CM

## 2021-03-04 DIAGNOSIS — Z00.00 ROUTINE GENERAL MEDICAL EXAMINATION AT A HEALTH CARE FACILITY: Primary | ICD-10-CM

## 2021-03-04 PROCEDURE — 99396 PREV VISIT EST AGE 40-64: CPT | Performed by: FAMILY MEDICINE

## 2021-03-04 RX ORDER — METFORMIN HYDROCHLORIDE 500 MG/1
500 TABLET, EXTENDED RELEASE ORAL
Qty: 90 TAB | Refills: 1 | Status: SHIPPED | OUTPATIENT
Start: 2021-03-04 | End: 2021-12-15

## 2021-03-04 NOTE — PROGRESS NOTES
HISTORY OF PRESENT ILLNESS  Lolly Mirza is a 39 y.o. female. seen for complete physical. Has lost 7 lbs since she was started on Metformin  HPI  Health Maintenance  Immunizations:    Influenza: up to date. Tetanus: up to date. Gardasil: n/a. Cancer screening:   Cervical: reviewed guidelines, UTD. Breast: reviewed guidelines, reviewed SBE with her, UTD    Cardiovascular Review  The patient has hypertension. She reports taking medications as instructed, no medication side effects noted, home BP monitoring in range of 130s/80s's  no chest pain on exertion, no dyspnea on exertion, no swelling of ankles, no orthostatic dizziness or lightheadedness, no orthopnea or paroxysmal nocturnal dyspnea, no palpitations, no intermittent claudication symptoms, no muscle aches or pain. Diet and Lifestyle: generally follows a low fat low cholesterol diet, generally follows a low sodium diet, exercises regularly, nonsmoker. Lab review: labs reviewed and discussed with patient. Medicines: Amlodipine 5 mg      Patient Care Team:  Dustin Fuentes MD as PCP - General (Family Medicine)  Dustin Fuentes MD as PCP - REHABILITATION [facility] Provider  [facility], DO (Neurology)  Sasha Strong MD as Physician (Obstetrics & Gynecology)  Lui Musa NP (Surgery)  Glo Watkins MD (Cardiology)  Sasha Strong MD (Obstetrics & Gynecology)       The following sections were reviewed & updated as appropriate: PMH, PSH, FH, and SH. Review of Systems   Constitutional: Negative for chills, fever and malaise/fatigue. HENT: Negative for congestion, ear pain, sore throat and tinnitus. Eyes: Negative for blurred vision, double vision, pain and discharge. Respiratory: Negative for cough, shortness of breath and wheezing. Cardiovascular: Negative for chest pain, palpitations and leg swelling. Gastrointestinal: Negative for abdominal pain, blood in stool, constipation, diarrhea, nausea and vomiting.   Genitourinary: Negative for dysuria, frequency, hematuria and urgency.   Musculoskeletal: Negative for back pain, joint pain and myalgias.   Skin: Negative for rash.        Hair loss   Neurological: Negative for dizziness, tremors, seizures and headaches.   Endo/Heme/Allergies: Negative for polydipsia. Does not bruise/bleed easily.   Psychiatric/Behavioral: Negative for depression and substance abuse. The patient is not nervous/anxious.        Physical Exam  Vitals signs and nursing note reviewed.   Constitutional:       Appearance: She is well-developed.   HENT:      Head: Normocephalic and atraumatic.      Right Ear: External ear normal.      Left Ear: External ear normal.      Nose: Nose normal.   Eyes:      General: No scleral icterus.     Conjunctiva/sclera: Conjunctivae normal.      Pupils: Pupils are equal, round, and reactive to light.   Neck:      Musculoskeletal: Normal range of motion and neck supple.      Thyroid: No thyromegaly.      Vascular: No JVD.   Cardiovascular:      Rate and Rhythm: Normal rate and regular rhythm.      Heart sounds: Normal heart sounds. No murmur. No friction rub. No gallop.    Pulmonary:      Effort: Pulmonary effort is normal.      Breath sounds: Normal breath sounds. No wheezing or rales.   Chest:      Chest wall: No tenderness.      Breasts: Breasts are symmetrical.         Right: No inverted nipple, mass, nipple discharge, skin change or tenderness.         Left: No inverted nipple, mass, nipple discharge, skin change or tenderness.   Abdominal:      General: Bowel sounds are normal. There is no distension.      Palpations: Abdomen is soft. There is no mass.      Tenderness: There is no abdominal tenderness.   Musculoskeletal: Normal range of motion.         General: No tenderness.   Lymphadenopathy:      Cervical: No cervical adenopathy.   Skin:     General: Skin is warm and dry.      Findings: No rash.   Neurological:      Mental Status: She is alert and oriented to  person, place, and time. Cranial Nerves: No cranial nerve deficit. Deep Tendon Reflexes: Reflexes are normal and symmetric. Psychiatric:         Behavior: Behavior normal.         Thought Content: Thought content normal.         Judgment: Judgment normal.         ASSESSMENT and PLAN  Diagnoses and all orders for this visit:    1. Routine general medical examination at a health care facility  -     CBC WITH AUTOMATED DIFF; Future  -     HEMOGLOBIN A1C WITH EAG; Future  -     LIPID PANEL; Future  -     HEPATITIS C AB; Future  -     METABOLIC PANEL, COMPREHENSIVE; Future  -     TSH 3RD GENERATION; Future  -     URINALYSIS W/ RFLX MICROSCOPIC; Future    2. Insulin resistance syndrome  -     metFORMIN ER (GLUCOPHAGE XR) 500 mg tablet; Take 1 Tab by mouth daily (with dinner). Discussed lifestyle issues and health guidance given  Patient was given an after visit summary which includes diagnoses, vital signs, current medications, instructions and references & authorized prescriptions . Results of labs will be conveyed to patient, once available. Pt verbalized instructions I provided and expressed understanding of discussion that was held today. Follow-up and Dispositions    · Return in about 6 months (around 9/4/2021) for follow up.

## 2021-03-04 NOTE — LETTER
NOTIFICATION RETURN TO WORK  
 
3/4/2021 11:38 AM 
 
Ms. Sonido Capps [address] [id number] To Whom It May Concern: 
 
Sonido Capps is currently under the care of DAVY Umanzor. She was seen in office today and will return to work/school on: 03/05/2021 If there are questions or concerns please have the patient contact our office. Sincerely, Josiah Delgado MD

## 2021-03-04 NOTE — PATIENT INSTRUCTIONS
Diet and Exercise for Metabolic Syndrome: Care Instructions Your Care Instructions Metabolic syndrome is the name for a group of health problems. It includes having too much fat around your waist and high blood pressure. It also includes high triglycerides, high blood sugar, and low levels of healthy (HDL) cholesterol. These problems make it more likely you will have a heart attack or stroke or get diabetes. Your family history (your genes) can cause metabolic syndrome. So can unhealthy eating habits and not getting enough exercise. You can help lower your risk of heart attack, stroke, and diabetes if you eat healthy foods and get more exercise. It may be hard to make these lifestyle changes. But even small changes can help. Follow-up care is a key part of your treatment and safety. Be sure to make and go to all appointments, and call your doctor if you are having problems. It's also a good idea to know your test results and keep a list of the medicines you take. How can you care for yourself at home? Eat more fruits and vegetables · Fruits and vegetables have nutrients to help protect you from heart disease and high blood pressure. They are low in fat and high in fiber. Dark green, orange, and yellow ones are the healthiest. 
· Keep lots of vegetables ready for snacks. · Buy fruit that is in season. Then put it where you can see it so you will want to eat it. · Cook dishes that have a lot of vegetables. Soups and stir-fries are good choices. Limit saturated and trans fats · Read food labels, and try to avoid saturated and trans fats. They increase your risk of heart disease. · Use olive or canola oil when you cook. · Bake, broil, grill, or steam foods. Avoid fried foods. · Limit how much high-fat meat you eat. This includes hot dogs and sausages. When you prepare meat, cut off all the fat. · You can replace high-fat meat with fish and skinless poultry. You can also try products made from soybeans, like tofu. Soybeans may be very good for your heart. · Choose low-fat or fat-free milk and dairy products. Eat foods high in fiber · Foods high in fiber may reduce your cholesterol. And they may give you important vitamins and minerals. Good examples are oatmeal, cooked dried beans, brown rice, citrus fruits, and apples. · Eat whole-grain breads and cereals. They have more fiber than white bread or pastries. Limit high-sugar foods · Limit foods and drinks that are high in sugar. Some examples are soda pop, sugar-sweetened fruit drinks, candy, and many desserts. · Limit the amount of sugar, honey, and other sweeteners that you add to food and drinks. · Choose water instead of soda pop or other sugar-sweetened drinks. · Limit fruit juice. Limit salt and sodium · You can help lower your blood pressure if you limit salt and sodium. · If you take the salt shaker off the table, it may be easier to use less salt. You can also try using half the salt in a recipe. And you can avoid adding salt to cooking water for pasta, rice, and potatoes. · Try to eat fewer snacks, fast foods, and other high-salt, processed foods. Check labels so you know how much sodium a food has. · Choose canned goods (soups, vegetables, and beans) that are low in sodium. Get regular exercise · Get more exercise. Make sure your doctor knows when you start a new exercise program. Even small amounts of exercise will help you get stronger and have more energy. It can also help you manage your weight and your stress. · Walking is a good choice. Little by little, increase the amount you walk every day. Try for at least 30 minutes on most days of the week. Where can you learn more? Go to http://www.[employer].com/ Enter 558 [id number] in the search box to learn more about \"Diet and Exercise for Metabolic Syndrome: Care Instructions. \" Current as of: August 22, 2019               Content Version: 12.6 © 4728-0239 [employer]. Care instructions adapted under license by [employer] (which disclaims liability or warranty for this information). If you have questions about a medical condition or this instruction, always ask your healthcare professional. [place] [address] any warranty or liability for your use of this information.

## 2021-03-04 NOTE — PROGRESS NOTES
Chief Complaint   Patient presents with    Hypertension     follow up    Headache     body ache started yesterday at 6:00pm       1. Have you been to the ER, urgent care clinic since your last visit? Hospitalized since your last visit? No    2. Have you seen or consulted any other health care providers outside of the [address] [place] [address] since your last visit? Include any pap smears or colon screening.  No        3 most recent PHQ Screens 3/4/2021   PHQ Not Done -   Little interest or pleasure in doing things Not at all   Feeling down, depressed, irritable, or hopeless Not at all   Total Score PHQ 2 0       Health Maintenance Due   Topic Date Due    Hepatitis C Screening  Never done    COVID-19 Vaccine (1 of 2) Never done

## 2021-03-10 ENCOUNTER — HOSPITAL ENCOUNTER (OUTPATIENT)
Dept: MAMMOGRAPHY | Age: 46
Discharge: HOME | End: 2021-03-10
Attending: FAMILY MEDICINE
Payer: COMMERCIAL

## 2021-03-10 DIAGNOSIS — R92.8 ABNORMAL MAMMOGRAM: ICD-10-CM

## 2021-03-10 DIAGNOSIS — Z12.31 OTHER SCREENING MAMMOGRAM: ICD-10-CM

## 2021-03-10 PROCEDURE — 77062 BREAST TOMOSYNTHESIS BI: CPT

## 2021-03-15 ENCOUNTER — LAB ONLY (OUTPATIENT)
Dept: FAMILY MEDICINE CLINIC | Age: 46
End: 2021-03-15

## 2021-03-15 DIAGNOSIS — Z00.00 ROUTINE GENERAL MEDICAL EXAMINATION AT A HEALTH CARE FACILITY: ICD-10-CM

## 2021-03-15 LAB
ALBUMIN SERPL-MCNC: 4.1 G/DL (ref 3.5–5)
ALBUMIN/GLOB SERPL: 1.1 {RATIO} (ref 1.1–2.2)
ALP SERPL-CCNC: 138 U/L (ref 45–117)
ALT SERPL-CCNC: 62 U/L (ref 12–78)
ANION GAP SERPL CALC-SCNC: 8 MMOL/L (ref 5–15)
APPEARANCE UR: CLEAR
AST SERPL-CCNC: 22 U/L (ref 15–37)
BASOPHILS # BLD: 0.1 K/UL (ref 0–0.1)
BASOPHILS NFR BLD: 1 % (ref 0–1)
BILIRUB SERPL-MCNC: 0.6 MG/DL (ref 0.2–1)
BILIRUB UR QL: NEGATIVE
BUN SERPL-MCNC: 9 MG/DL (ref 6–20)
BUN/CREAT SERPL: 15 (ref 12–20)
CALCIUM SERPL-MCNC: 9.2 MG/DL (ref 8.5–10.1)
CHLORIDE SERPL-SCNC: 104 MMOL/L (ref 97–108)
CHOLEST SERPL-MCNC: 193 MG/DL
CO2 SERPL-SCNC: 27 MMOL/L (ref 21–32)
COLOR UR: NORMAL
CREAT SERPL-MCNC: 0.62 MG/DL (ref 0.55–1.02)
DIFFERENTIAL METHOD BLD: NORMAL
EOSINOPHIL # BLD: 0.2 K/UL (ref 0–0.4)
EOSINOPHIL NFR BLD: 2 % (ref 0–7)
ERYTHROCYTE [DISTWIDTH] IN BLOOD BY AUTOMATED COUNT: 12.7 % (ref 11.5–14.5)
EST. AVERAGE GLUCOSE BLD GHB EST-MCNC: 120 MG/DL
GLOBULIN SER CALC-MCNC: 3.7 G/DL (ref 2–4)
GLUCOSE SERPL-MCNC: 101 MG/DL (ref 65–100)
GLUCOSE UR STRIP.AUTO-MCNC: NEGATIVE MG/DL
HBA1C MFR BLD: 5.8 % (ref 4–5.6)
HCT VFR BLD AUTO: 43.9 % (ref 35–47)
HCV AB SERPL QL IA: NONREACTIVE
HCV COMMENT,HCGAC: NORMAL
HDLC SERPL-MCNC: 50 MG/DL
HDLC SERPL: 3.9 {RATIO} (ref 0–5)
HGB BLD-MCNC: 14.4 G/DL (ref 11.5–16)
HGB UR QL STRIP: NEGATIVE
IMM GRANULOCYTES # BLD AUTO: 0 K/UL (ref 0–0.04)
IMM GRANULOCYTES NFR BLD AUTO: 0 % (ref 0–0.5)
KETONES UR QL STRIP.AUTO: NEGATIVE MG/DL
LDLC SERPL CALC-MCNC: 119.4 MG/DL (ref 0–100)
LEUKOCYTE ESTERASE UR QL STRIP.AUTO: NEGATIVE
LIPID PROFILE,FLP: ABNORMAL
LYMPHOCYTES # BLD: 2.4 K/UL (ref 0.8–3.5)
LYMPHOCYTES NFR BLD: 28 % (ref 12–49)
MCH RBC QN AUTO: 29.4 PG (ref 26–34)
MCHC RBC AUTO-ENTMCNC: 32.8 G/DL (ref 30–36.5)
MCV RBC AUTO: 89.8 FL (ref 80–99)
MONOCYTES # BLD: 0.5 K/UL (ref 0–1)
MONOCYTES NFR BLD: 6 % (ref 5–13)
NEUTS SEG # BLD: 5.4 K/UL (ref 1.8–8)
NEUTS SEG NFR BLD: 63 % (ref 32–75)
NITRITE UR QL STRIP.AUTO: NEGATIVE
NRBC # BLD: 0 K/UL (ref 0–0.01)
NRBC BLD-RTO: 0 PER 100 WBC
PH UR STRIP: 7.5 [PH] (ref 5–8)
PLATELET # BLD AUTO: 257 K/UL (ref 150–400)
PMV BLD AUTO: 11.8 FL (ref 8.9–12.9)
POTASSIUM SERPL-SCNC: 3.6 MMOL/L (ref 3.5–5.1)
PROT SERPL-MCNC: 7.8 G/DL (ref 6.4–8.2)
PROT UR STRIP-MCNC: NEGATIVE MG/DL
RBC # BLD AUTO: 4.89 M/UL (ref 3.8–5.2)
SODIUM SERPL-SCNC: 139 MMOL/L (ref 136–145)
SP GR UR REFRACTOMETRY: <1.005 (ref 1–1.03)
TRIGL SERPL-MCNC: 118 MG/DL (ref ?–150)
TSH SERPL DL<=0.05 MIU/L-ACNC: 2.77 UIU/ML (ref 0.36–3.74)
UR CULT HOLD, URHOLD: NORMAL
UROBILINOGEN UR QL STRIP.AUTO: 0.2 EU/DL (ref 0.2–1)
VLDLC SERPL CALC-MCNC: 23.6 MG/DL
WBC # BLD AUTO: 8.5 K/UL (ref 3.6–11)

## 2021-03-16 NOTE — PROGRESS NOTES
Zion Garcia,  I have reviewed your results. Very reassuring and stable. Results including blood count, kidney and liver function, thyroid, hepatitis C titer and urine are very normal.  Average sugar is stable in very early prediabetic range. Please continue working on diet and exercise. Cholesterol numbers are stable including total, bad and good cholesterol. No change in current medications and please continue working on diet and exercise. I have completed your be well, wellness form. You still need to sign so you can  completed form from my office and sign and return us to fax to wellness center. Let me know if you have any questions.   thanks

## 2021-03-17 NOTE — PROGRESS NOTES
Called patient. Two patient identifiers verified. Discussed lab results. Se verbalized understanding.

## 2021-04-12 DIAGNOSIS — I10 HTN, GOAL BELOW 140/90: ICD-10-CM

## 2021-04-12 RX ORDER — AMLODIPINE BESYLATE 5 MG/1
TABLET ORAL
Qty: 60 TAB | Refills: 0 | Status: SHIPPED | OUTPATIENT
Start: 2021-04-12 | End: 2021-06-14

## 2021-06-14 DIAGNOSIS — I10 HTN, GOAL BELOW 140/90: ICD-10-CM

## 2021-06-14 RX ORDER — AMLODIPINE BESYLATE 5 MG/1
TABLET ORAL
Qty: 60 TABLET | Refills: 0 | Status: SHIPPED | OUTPATIENT
Start: 2021-06-14 | End: 2021-10-04

## 2021-09-01 ENCOUNTER — OFFICE VISIT (OUTPATIENT)
Dept: FAMILY MEDICINE CLINIC | Age: 46
End: 2021-09-01
Payer: COMMERCIAL

## 2021-09-01 VITALS
HEIGHT: 57 IN | HEART RATE: 79 BPM | RESPIRATION RATE: 15 BRPM | WEIGHT: 134.6 LBS | DIASTOLIC BLOOD PRESSURE: 82 MMHG | SYSTOLIC BLOOD PRESSURE: 124 MMHG | TEMPERATURE: 99 F | BODY MASS INDEX: 29.04 KG/M2 | OXYGEN SATURATION: 98 %

## 2021-09-01 DIAGNOSIS — R73.03 PREDIABETES: ICD-10-CM

## 2021-09-01 DIAGNOSIS — F51.01 PRIMARY INSOMNIA: ICD-10-CM

## 2021-09-01 DIAGNOSIS — E78.5 DYSLIPIDEMIA: ICD-10-CM

## 2021-09-01 DIAGNOSIS — E88.81 INSULIN RESISTANCE SYNDROME: ICD-10-CM

## 2021-09-01 DIAGNOSIS — I10 HTN, GOAL BELOW 140/90: Primary | ICD-10-CM

## 2021-09-01 LAB
ANION GAP SERPL CALC-SCNC: 4 MMOL/L (ref 5–15)
BUN SERPL-MCNC: 8 MG/DL (ref 6–20)
BUN/CREAT SERPL: 15 (ref 12–20)
CALCIUM SERPL-MCNC: 8.7 MG/DL (ref 8.5–10.1)
CHLORIDE SERPL-SCNC: 108 MMOL/L (ref 97–108)
CHOLEST SERPL-MCNC: 198 MG/DL
CO2 SERPL-SCNC: 29 MMOL/L (ref 21–32)
CREAT SERPL-MCNC: 0.55 MG/DL (ref 0.55–1.02)
EST. AVERAGE GLUCOSE BLD GHB EST-MCNC: 120 MG/DL
GLUCOSE SERPL-MCNC: 87 MG/DL (ref 65–100)
HBA1C MFR BLD: 5.8 % (ref 4–5.6)
HDLC SERPL-MCNC: 50 MG/DL
HDLC SERPL: 4 {RATIO} (ref 0–5)
LDLC SERPL CALC-MCNC: 129.6 MG/DL (ref 0–100)
POTASSIUM SERPL-SCNC: 3.8 MMOL/L (ref 3.5–5.1)
SODIUM SERPL-SCNC: 141 MMOL/L (ref 136–145)
TRIGL SERPL-MCNC: 92 MG/DL (ref ?–150)
VLDLC SERPL CALC-MCNC: 18.4 MG/DL

## 2021-09-01 PROCEDURE — 99213 OFFICE O/P EST LOW 20 MIN: CPT | Performed by: FAMILY MEDICINE

## 2021-09-01 NOTE — PROGRESS NOTES
HISTORY OF PRESENT ILLNESS  Imtiaz Everett is a 42 y.o. female. Patient was seen today for 6 months follow-up appointment on hypertension, insulin resistance syndrome and dyslipidemia. HPI  Cardiovascular Review  The patient has hypertension and dyslipidemia. She reports taking medications as instructed, no medication side effects noted, home BP monitoring in range of 130s/80s's  no chest pain on exertion, no dyspnea on exertion, no swelling of ankles, no orthostatic dizziness or lightheadedness, no orthopnea or paroxysmal nocturnal dyspnea, no palpitations, no intermittent claudication symptoms, no muscle aches or pain. Diet and Lifestyle: generally follows a low fat low cholesterol diet, generally follows a low sodium diet, exercises regularly, nonsmoker. Lab review: labs reviewed and discussed with patient. Medicines: Amlodipine 5 mg    Also on Metformin 500 mg ER to improve insulin resistance, prediabetes and obesity. Review of Systems   Constitutional: Negative for chills, fever and malaise/fatigue. HENT: Negative for congestion, ear pain, sore throat and tinnitus. Eyes: Negative for blurred vision, double vision, pain and discharge. Respiratory: Negative for cough, shortness of breath and wheezing. Cardiovascular: Negative for chest pain, palpitations and leg swelling. Gastrointestinal: Negative for abdominal pain, blood in stool, constipation, diarrhea, nausea and vomiting. Genitourinary: Negative for dysuria, frequency, hematuria and urgency. Musculoskeletal: Negative for back pain, joint pain and myalgias. Skin: Negative for rash. Neurological: Negative for dizziness, tremors, seizures and headaches. Endo/Heme/Allergies: Negative for polydipsia. Does not bruise/bleed easily. Psychiatric/Behavioral: Negative for depression and substance abuse. The patient has insomnia. The patient is not nervous/anxious. Physical Exam  Vitals and nursing note reviewed. Constitutional:       Appearance: She is well-developed. She is not diaphoretic. HENT:      Head: Normocephalic and atraumatic. Right Ear: External ear normal.      Mouth/Throat:      Pharynx: No oropharyngeal exudate. Eyes:      General: No scleral icterus. Conjunctiva/sclera: Conjunctivae normal.      Pupils: Pupils are equal, round, and reactive to light. Neck:      Thyroid: No thyromegaly. Vascular: No JVD. Cardiovascular:      Rate and Rhythm: Normal rate and regular rhythm. Heart sounds: Normal heart sounds. No murmur heard. Pulmonary:      Effort: Pulmonary effort is normal.      Breath sounds: Normal breath sounds. No wheezing. Abdominal:      General: Bowel sounds are normal. There is no distension. Palpations: Abdomen is soft. There is no mass. Musculoskeletal:         General: No tenderness. Normal range of motion. Cervical back: Normal range of motion and neck supple. Lymphadenopathy:      Cervical: No cervical adenopathy. Skin:     General: Skin is warm and dry. Findings: No rash. Neurological:      Mental Status: She is alert and oriented to person, place, and time. Cranial Nerves: No cranial nerve deficit. Deep Tendon Reflexes: Reflexes are normal and symmetric. Reflexes normal.         ASSESSMENT and PLAN  Diagnoses and all orders for this visit:    1. HTN, goal below 133/66  -     METABOLIC PANEL, BASIC; Future    2. Prediabetes  -     HEMOGLOBIN A1C WITH EAG; Future    3. Insulin resistance syndrome  -     HEMOGLOBIN A1C WITH EAG; Future    4. Dyslipidemia  -     LIPID PANEL; Future    5. Primary insomnia    Discussed sleep health and sleep hygiene and recommended to take over-the-counter melatonin. Discussed lifestyle issues and health guidance given  Patient was given an after visit summary which includes diagnoses, vital signs, current medications, instructions and references & authorized prescriptions .  Results of labs will be conveyed to patient, once available. Pt verbalized instructions I provided and expressed understanding of discussion that was held today. Follow-up and Dispositions    · Return in about 6 months (around 3/1/2022) for physical, fasting. Please note that this dictation was completed with [employer], the computer voice recognition software. Quite often unanticipated grammatical, syntax, homophones, and other interpretive errors are inadvertently transcribed by the computer software. Please disregard these errors. Please excuse any errors that have escaped final proofreading. Thank you.

## 2021-09-01 NOTE — PROGRESS NOTES
Chief Complaint   Patient presents with    Hypertension     follow up       1. Have you been to the ER, urgent care clinic since your last visit? Hospitalized since your last visit? No    2. Have you seen or consulted any other health care providers outside of the [address] [place] [address] since your last visit? Include any pap smears or colon screening.  No        3 most recent PHQ Screens 9/1/2021   PHQ Not Done -   Little interest or pleasure in doing things Not at all   Feeling down, depressed, irritable, or hopeless Not at all   Total Score PHQ 2 0       Health Maintenance Due   Topic Date Due    Flu Vaccine (1) 09/01/2021

## 2021-09-01 NOTE — PATIENT INSTRUCTIONS
High Blood Pressure: Care Instructions  Overview     It's normal for blood pressure to go up and down throughout the day. But if it stays up, you have high blood pressure. Another name for high blood pressure is hypertension. Despite what a lot of people think, high blood pressure usually doesn't cause headaches or make you feel dizzy or lightheaded. It usually has no symptoms. But it does increase your risk of stroke, heart attack, and other problems. You and your doctor will talk about your risks of these problems based on your blood pressure. Your doctor will give you a goal for your blood pressure. Your goal will be based on your health and your age. Lifestyle changes, such as eating healthy and being active, are always important to help lower blood pressure. You might also take medicine to reach your blood pressure goal.  Follow-up care is a key part of your treatment and safety. Be sure to make and go to all appointments, and call your doctor if you are having problems. It's also a good idea to know your test results and keep a list of the medicines you take. How can you care for yourself at home? Medical treatment  · If you stop taking your medicine, your blood pressure will go back up. You may take one or more types of medicine to lower your blood pressure. Be safe with medicines. Take your medicine exactly as prescribed. Call your doctor if you think you are having a problem with your medicine. · Talk to your doctor before you start taking aspirin every day. Aspirin can help certain people lower their risk of a heart attack or stroke. But taking aspirin isn't right for everyone, because it can cause serious bleeding. · See your doctor regularly. You may need to see the doctor more often at first or until your blood pressure comes down. · If you are taking blood pressure medicine, talk to your doctor before you take decongestants or anti-inflammatory medicine, such as ibuprofen.  Some of these medicines can raise blood pressure. · Learn how to check your blood pressure at home. Lifestyle changes  · Stay at a healthy weight. This is especially important if you put on weight around the waist. Losing even 10 pounds can help you lower your blood pressure. · If your doctor recommends it, get more exercise. Walking is a good choice. Bit by bit, increase the amount you walk every day. Try for at least 30 minutes on most days of the week. You also may want to swim, bike, or do other activities. · Avoid or limit alcohol. Talk to your doctor about whether you can drink any alcohol. · Try to limit how much sodium you eat to less than 2,300 milligrams (mg) a day. Your doctor may ask you to try to eat less than 1,500 mg a day. · Eat plenty of fruits (such as bananas and oranges), vegetables, legumes, whole grains, and low-fat dairy products. · Lower the amount of saturated fat in your diet. Saturated fat is found in animal products such as milk, cheese, and meat. Limiting these foods may help you lose weight and also lower your risk for heart disease. · Do not smoke. Smoking increases your risk for heart attack and stroke. If you need help quitting, talk to your doctor about stop-smoking programs and medicines. These can increase your chances of quitting for good. When should you call for help? Call  911 anytime you think you may need emergency care. This may mean having symptoms that suggest that your blood pressure is causing a serious heart or blood vessel problem. Your blood pressure may be over 180/120. For example, call 911 if:    · You have symptoms of a heart attack. These may include:  ? Chest pain or pressure, or a strange feeling in the chest.  ? Sweating. ? Shortness of breath. ? Nausea or vomiting. ? Pain, pressure, or a strange feeling in the back, neck, jaw, or upper belly or in one or both shoulders or arms. ? Lightheadedness or sudden weakness.   ? A fast or irregular heartbeat.     · You have symptoms of a stroke. These may include:  ? Sudden numbness, tingling, weakness, or loss of movement in your face, arm, or leg, especially on only one side of your body. ? Sudden vision changes. ? Sudden trouble speaking. ? Sudden confusion or trouble understanding simple statements. ? Sudden problems with walking or balance. ? A sudden, severe headache that is different from past headaches.     · You have severe back or belly pain. Do not wait until your blood pressure comes down on its own. Get help right away. Call your doctor now or seek immediate care if:    · Your blood pressure is much higher than normal (such as 180/120 or higher), but you don't have symptoms.     · You think high blood pressure is causing symptoms, such as:  ? Severe headache.  ? Blurry vision. Watch closely for changes in your health, and be sure to contact your doctor if:    · Your blood pressure measures higher than your doctor recommends at least 2 times. That means the top number is higher or the bottom number is higher, or both.     · You think you may be having side effects from your blood pressure medicine. Where can you learn more? Go to http://www.gray.com/  Enter [id number] in the search box to learn more about \"High Blood Pressure: Care Instructions. \"  Current as of: August 31, 2020               Content Version: 12.8  © 2006-2021 [employer]. Care instructions adapted under license by [employer] (which disclaims liability or warranty for this information). If you have questions about a medical condition or this instruction, always ask your healthcare professional. [place] [address] any warranty or liability for your use of this information.

## 2021-09-03 NOTE — PROGRESS NOTES
Called patient. Spoke to patient's  as per PHI. Two patient identifiers verified. Discussed lab results per provider's note. He Verbalized understanding.

## 2021-09-03 NOTE — PROGRESS NOTES
Zion Walker,  I have reviewed your results. Results for kidney function, including electrolytes are normal.  Average sugar is stable in early prediabetic range and cholesterol numbers are also normal.Please let me know if any question, thanks.

## 2021-10-04 DIAGNOSIS — I10 HTN, GOAL BELOW 140/90: ICD-10-CM

## 2021-10-04 RX ORDER — AMLODIPINE BESYLATE 5 MG/1
TABLET ORAL
Qty: 60 TABLET | Refills: 0 | Status: SHIPPED | OUTPATIENT
Start: 2021-10-04 | End: 2022-03-03

## 2021-10-14 ENCOUNTER — OFFICE VISIT (OUTPATIENT)
Dept: FAMILY MEDICINE CLINIC | Age: 46
End: 2021-10-14
Payer: COMMERCIAL

## 2021-10-14 VITALS
BODY MASS INDEX: 29.12 KG/M2 | RESPIRATION RATE: 16 BRPM | WEIGHT: 135 LBS | OXYGEN SATURATION: 98 % | TEMPERATURE: 98.3 F | HEIGHT: 57 IN

## 2021-10-14 DIAGNOSIS — J30.89 ENVIRONMENTAL AND SEASONAL ALLERGIES: Primary | ICD-10-CM

## 2021-10-14 DIAGNOSIS — Z22.7 TB LUNG, LATENT: ICD-10-CM

## 2021-10-14 LAB
COMMENT, HOLDF: NORMAL
SAMPLES BEING HELD,HOLD: NORMAL

## 2021-10-14 PROCEDURE — 99213 OFFICE O/P EST LOW 20 MIN: CPT | Performed by: NURSE PRACTITIONER

## 2021-10-14 NOTE — LETTER
10/14/2021    Ms. Noe Lance  [address]  [place] [address]      To Whom It May Concern:    Noe Lance is under the care of [facility]. Kristal Romo has a history of latent tuberculosis found on incidental screening and treated in 2014 by the [facility]. Bayron Anderson had a negative chest xray in October of 2016.  She does not require repeat chest xray at this time as she is asymptomatic.      It is not recommended that Kristal Romo undergo Tuberculin screening as she has a history of Latent TB. If there are questions or concerns please have the patient contact our office.         Sincerely,      Hany Ball NP

## 2021-10-14 NOTE — PROGRESS NOTES
Assessment/Plan:     Diagnoses and all orders for this visit:    1. Environmental and seasonal allergies  -     ALLERGEN PROFILE, ZONE 2; Future  Allergy testing as above. We have discussed appropriate otc treatment. 2. TB lung, latent  I have furnished a letter for her today and told her she should not receive tuberculin screening as this will always be positive. Discussed expected course/resolution/complications of diagnosis in detail with patient. Medication risks/benefits/costs/interactions/alternatives discussed with patient. Pt was given after visit summary which includes diagnoses, current medications & vitals. Pt expressed understanding with the diagnosis and plan          Subjective:      Juan Wang is a 42 y.o. female who presents for had concerns including Headache and Dizziness. Reports a one week history of cough, vertigo, headache, body aches. She reports improvement in her symptoms. She tested negative for COVID. This is her second evaluation. She has attempted otc treatment with some improvement. She reports a history of the same symptoms every year in the fall. She is not on antihistamines. She is concerned regarding a TB test which was placed Tuesday. She has to return today or tomorrow for it to be read. She has a history of Latent TB which was already treated. Patient Active Problem List   Diagnosis Code    Brain lesion G93.9    Migraine without status migrainosus, not intractable G43.909    LTBI (latent tuberculosis infection) Z22.7    Immune to varicella Z78.9    HTN, goal below 140/90 I10    Hypokalemia E87.6       Current Outpatient Medications   Medication Sig Dispense Refill    amLODIPine (NORVASC) 5 mg tablet TAKE ONE TABLET BY MOUTH ONE TIME DAILY 60 Tablet 0    metFORMIN ER (GLUCOPHAGE XR) 500 mg tablet Take 1 Tab by mouth daily (with dinner).  90 Tab 1    acetaminophen (TYLENOL EXTRA STRENGTH) 500 mg tablet Take 2 Tabs by mouth every six (6) hours as needed for Pain. No Known Allergies    ROS:   Review of Systems   Constitutional: Negative for chills, fever and malaise/fatigue. HENT: Negative for congestion, ear pain, sinus pain and sore throat. Respiratory: Positive for cough. Negative for sputum production, shortness of breath and wheezing. Cardiovascular: Negative for chest pain. Neurological: Positive for dizziness and headaches. Negative for seizures. Endo/Heme/Allergies: Negative for environmental allergies. Objective:     Visit Vitals  Temp 98.3 °F (36.8 °C)   Resp 16   Ht 4' 9\" (1.448 m)   Wt 135 lb (61.2 kg)   SpO2 98%   BMI 29.21 kg/m²       Vitals and Nurse Documentation reviewed. Physical Exam  Constitutional:       General: She is not in acute distress. Cardiovascular:      Heart sounds: S1 normal and S2 normal. No murmur heard. No friction rub. No gallop. Pulmonary:      Effort: No respiratory distress. Breath sounds: Normal breath sounds. Skin:     General: Skin is warm and dry.    Psychiatric:         Mood and Affect: Mood and affect normal.

## 2021-10-14 NOTE — PROGRESS NOTES
Chief Complaint   Patient presents with    Headache    Dizziness        1. \"Have you been to the ER, urgent care clinic since your last visit? Hospitalized since your last visit? \" No    2. \"Have you seen or consulted any other health care providers outside of the [address] [place] [address] since your last visit? \" No     3. For patients aged 39-70: Has the patient had a colonoscopy? No     If the patient is female:    4. For patients aged 41-77: Has the patient had a mammogram within the past 2 years? No    5. For patients aged 21-30: Has the patient had a pap smear?  No    3 most recent PHQ Screens 9/1/2021   PHQ Not Done -   Little interest or pleasure in doing things Not at all   Feeling down, depressed, irritable, or hopeless Not at all   Total Score PHQ 2 0

## 2021-10-17 LAB
A ALTERNATA IGE QN: <0.1 KU/L
A FUMIGATUS IGE QN: <0.1 KU/L
AMER ROACH IGE QN: <0.1 KU/L
AMER SYCAMORE IGE QN: <0.1 KU/L
BAHIA GRASS IGE QN: <0.1 KU/L
BERMUDA GRASS IGE QN: <0.1 KU/L
BOXELDER IGE QN: <0.1 KU/L
C HERBARUM IGE QN: <0.1 KU/L
CAT DANDER IGG QN: <0.1 KU/L
CLASS DESCRIPTION, 600268: NORMAL
COMMON RAGWEED IGE QN: <0.1 KU/L
D FARINAE IGE QN: <0.1 KU/L
D PTERONYSS IGE QN: <0.1 KU/L
DEPRECATED IGE QN: <0.1 KU/L
DOG DANDER IGE QN: <0.1 KU/L
ENGL PLANTAIN IGE QN: <0.1 KU/L
JOHNSON GRASS IGE QN: <0.1 KU/L
M RACEMOSUS IGE QN: <0.1 KU/L
MT JUNIPER IGE QN: <0.1 KU/L
MUGWORT IGE QN: <0.1 KU/L
NETTLE IGE QN: <0.1 KU/L
P NOTATUM IGE QN: <0.1 KU/L
S BOTRYOSUM IGE QN: <0.1 KU/L
SHEEP SORREL IGE QN: <0.1 KU/L
SWEET GUM IGE QN: <0.1 KU/L
TIMOTHY IGE QN: <0.1 KU/L
WHITE BIRCH IGE QN: <0.1 KU/L
WHITE ELM IGG QN: <0.1 KU/L
WHITE HICKORY IGE QN: <0.1 KU/L
WHITE MULBERRY IGE QN: <0.1 KU/L
WHITE OAK IGE QN: <0.1 KU/L

## 2021-12-15 DIAGNOSIS — E88.81 INSULIN RESISTANCE SYNDROME: ICD-10-CM

## 2021-12-15 RX ORDER — METFORMIN HYDROCHLORIDE 500 MG/1
TABLET, EXTENDED RELEASE ORAL
Qty: 90 TABLET | Refills: 1 | Status: SHIPPED | OUTPATIENT
Start: 2021-12-15

## 2021-12-30 ENCOUNTER — TELEPHONE (OUTPATIENT)
Dept: FAMILY MEDICINE CLINIC | Age: 46
End: 2021-12-30

## 2021-12-30 NOTE — TELEPHONE ENCOUNTER
----- Message from Surjit Gasca sent at 12/30/2021 10:14 AM EST -----  Subject: Appointment Request    Reason for Call: Semi-Routine Cough, Cold Symptoms    QUESTIONS  Type of Appointment? Established Patient  Reason for appointment request? No appointments available during search  Additional Information for Provider? Pt is not feeling well, body aches,   fatigue, chest pain. Covid neg. this morning. No appts thru 1/3. If   something becomes available please reach out to Pt.  ---------------------------------------------------------------------------  --------------  CALL BACK INFO  What is the best way for the office to contact you? OK to leave message on   voicemail  Preferred Call Back Phone Number? [phone]  ---------------------------------------------------------------------------  --------------  SCRIPT ANSWERS  Relationship to Patient? Self  Are you currently unable to finish sentences due to any difficulty   breathing? No  Are you unable to swallow liquids? No  Are you having fevers (100.4 or greater), chills, or sweats? No  Do you have COPD, asthma or a chronic lung condition? No  Have your symptoms been present for more than 5 days? No  Have you recently (14 days) been seen by a provider for this issue? No  Have you been diagnosed with, awaiting test results for, or told that you   are suspected of having COVID-19 (Coronavirus)? (If patient has tested   negative or was tested as a requirement for work, school, or travel and   not based on symptoms, answer no)? No  Within the past two weeks have you developed any of the following symptoms   (answer no if symptoms have been present longer than 2 weeks or began   more than 2 weeks ago)?  Fever or Chills, Cough, Shortness of breath or   difficulty breathing, Loss of taste or smell, Sore throat, Nasal   congestion, Sneezing or runny nose, Fatigue or generalized body aches   (answer no if pain is specific to a body part e.g. back pain), Diarrhea, Headache?  Yes

## 2022-03-01 ENCOUNTER — OFFICE VISIT (OUTPATIENT)
Dept: FAMILY MEDICINE CLINIC | Age: 47
End: 2022-03-01
Payer: COMMERCIAL

## 2022-03-01 VITALS
BODY MASS INDEX: 28.87 KG/M2 | HEIGHT: 57 IN | WEIGHT: 133.8 LBS | SYSTOLIC BLOOD PRESSURE: 121 MMHG | DIASTOLIC BLOOD PRESSURE: 82 MMHG | HEART RATE: 79 BPM | OXYGEN SATURATION: 99 % | RESPIRATION RATE: 18 BRPM | TEMPERATURE: 97.4 F

## 2022-03-01 DIAGNOSIS — Z00.00 ROUTINE GENERAL MEDICAL EXAMINATION AT A HEALTH CARE FACILITY: Primary | ICD-10-CM

## 2022-03-01 DIAGNOSIS — K21.9 GASTROESOPHAGEAL REFLUX DISEASE WITHOUT ESOPHAGITIS: ICD-10-CM

## 2022-03-01 DIAGNOSIS — J30.89 ENVIRONMENTAL AND SEASONAL ALLERGIES: ICD-10-CM

## 2022-03-01 LAB
ALBUMIN SERPL-MCNC: 4 G/DL (ref 3.5–5)
ALBUMIN/GLOB SERPL: 1.1 {RATIO} (ref 1.1–2.2)
ALP SERPL-CCNC: 111 U/L (ref 45–117)
ALT SERPL-CCNC: 43 U/L (ref 12–78)
ANION GAP SERPL CALC-SCNC: 4 MMOL/L (ref 5–15)
APPEARANCE UR: ABNORMAL
AST SERPL-CCNC: 19 U/L (ref 15–37)
BACTERIA URNS QL MICRO: NEGATIVE /HPF
BASOPHILS # BLD: 0 K/UL (ref 0–0.1)
BASOPHILS NFR BLD: 1 % (ref 0–1)
BILIRUB SERPL-MCNC: 0.5 MG/DL (ref 0.2–1)
BILIRUB UR QL: NEGATIVE
BUN SERPL-MCNC: 9 MG/DL (ref 6–20)
BUN/CREAT SERPL: 17 (ref 12–20)
CALCIUM SERPL-MCNC: 9.1 MG/DL (ref 8.5–10.1)
CHLORIDE SERPL-SCNC: 106 MMOL/L (ref 97–108)
CHOLEST SERPL-MCNC: 194 MG/DL
CO2 SERPL-SCNC: 28 MMOL/L (ref 21–32)
COLOR UR: ABNORMAL
CREAT SERPL-MCNC: 0.52 MG/DL (ref 0.55–1.02)
DIFFERENTIAL METHOD BLD: NORMAL
EOSINOPHIL # BLD: 0.1 K/UL (ref 0–0.4)
EOSINOPHIL NFR BLD: 2 % (ref 0–7)
EPITH CASTS URNS QL MICRO: ABNORMAL /LPF
ERYTHROCYTE [DISTWIDTH] IN BLOOD BY AUTOMATED COUNT: 12.6 % (ref 11.5–14.5)
EST. AVERAGE GLUCOSE BLD GHB EST-MCNC: 123 MG/DL
GLOBULIN SER CALC-MCNC: 3.7 G/DL (ref 2–4)
GLUCOSE SERPL-MCNC: 89 MG/DL (ref 65–100)
GLUCOSE UR STRIP.AUTO-MCNC: NEGATIVE MG/DL
HBA1C MFR BLD: 5.9 % (ref 4–5.6)
HCT VFR BLD AUTO: 43.9 % (ref 35–47)
HDLC SERPL-MCNC: 46 MG/DL
HDLC SERPL: 4.2 {RATIO} (ref 0–5)
HGB BLD-MCNC: 14.4 G/DL (ref 11.5–16)
HGB UR QL STRIP: ABNORMAL
IMM GRANULOCYTES # BLD AUTO: 0 K/UL (ref 0–0.04)
IMM GRANULOCYTES NFR BLD AUTO: 0 % (ref 0–0.5)
KETONES UR QL STRIP.AUTO: NEGATIVE MG/DL
LDLC SERPL CALC-MCNC: 112.6 MG/DL (ref 0–100)
LEUKOCYTE ESTERASE UR QL STRIP.AUTO: ABNORMAL
LYMPHOCYTES # BLD: 2.4 K/UL (ref 0.8–3.5)
LYMPHOCYTES NFR BLD: 31 % (ref 12–49)
MCH RBC QN AUTO: 30.1 PG (ref 26–34)
MCHC RBC AUTO-ENTMCNC: 32.8 G/DL (ref 30–36.5)
MCV RBC AUTO: 91.8 FL (ref 80–99)
MONOCYTES # BLD: 0.5 K/UL (ref 0–1)
MONOCYTES NFR BLD: 6 % (ref 5–13)
NEUTS SEG # BLD: 4.7 K/UL (ref 1.8–8)
NEUTS SEG NFR BLD: 60 % (ref 32–75)
NITRITE UR QL STRIP.AUTO: NEGATIVE
NRBC # BLD: 0 K/UL (ref 0–0.01)
NRBC BLD-RTO: 0 PER 100 WBC
PH UR STRIP: 7 [PH] (ref 5–8)
PLATELET # BLD AUTO: 173 K/UL (ref 150–400)
PMV BLD AUTO: 12.3 FL (ref 8.9–12.9)
POTASSIUM SERPL-SCNC: 3.7 MMOL/L (ref 3.5–5.1)
PROT SERPL-MCNC: 7.7 G/DL (ref 6.4–8.2)
PROT UR STRIP-MCNC: NEGATIVE MG/DL
RBC # BLD AUTO: 4.78 M/UL (ref 3.8–5.2)
RBC #/AREA URNS HPF: >100 /HPF (ref 0–5)
SODIUM SERPL-SCNC: 138 MMOL/L (ref 136–145)
SP GR UR REFRACTOMETRY: 1.01 (ref 1–1.03)
TRIGL SERPL-MCNC: 177 MG/DL (ref ?–150)
TSH SERPL DL<=0.05 MIU/L-ACNC: 2.17 UIU/ML (ref 0.36–3.74)
UROBILINOGEN UR QL STRIP.AUTO: 0.2 EU/DL (ref 0.2–1)
VLDLC SERPL CALC-MCNC: 35.4 MG/DL
WBC # BLD AUTO: 7.8 K/UL (ref 3.6–11)
WBC URNS QL MICRO: ABNORMAL /HPF (ref 0–4)

## 2022-03-01 PROCEDURE — 99396 PREV VISIT EST AGE 40-64: CPT | Performed by: FAMILY MEDICINE

## 2022-03-01 RX ORDER — MOMETASONE FUROATE 50 UG/1
2 SPRAY, METERED NASAL DAILY
Qty: 1 EACH | Refills: 1 | Status: SHIPPED | OUTPATIENT
Start: 2022-03-01 | End: 2022-06-17

## 2022-03-01 RX ORDER — OMEPRAZOLE 40 MG/1
40 CAPSULE, DELAYED RELEASE ORAL DAILY
Qty: 30 CAPSULE | Refills: 0 | Status: SHIPPED | OUTPATIENT
Start: 2022-03-01 | End: 2022-06-17

## 2022-03-01 RX ORDER — MINERAL OIL
180 ENEMA (ML) RECTAL DAILY
Qty: 30 TABLET | Refills: 1 | Status: SHIPPED | OUTPATIENT
Start: 2022-03-01 | End: 2022-06-17

## 2022-03-01 NOTE — PROGRESS NOTES
Chief Complaint   Patient presents with    Complete Physical     follow up       1. \"Have you been to the ER, urgent care clinic since your last visit? Hospitalized since your last visit? \" No    2. \"Have you seen or consulted any other health care providers outside of the [address] [place] [address] since your last visit? \" No     3. For patients aged 39-70: Has the patient had a colonoscopy / FIT/ Cologuard? NA - based on age      If the patient is female:    4. For patients aged 41-77: Has the patient had a mammogram within the past 2 years? Yes - no Care Gap present      5. For patients aged 21-65: Has the patient had a pap smear? Yes - no Care Gap present         3 most recent PHQ Screens 3/1/2022   PHQ Not Done -   Little interest or pleasure in doing things Not at all   Feeling down, depressed, irritable, or hopeless Not at all   Total Score PHQ 2 0         Abuse Screening Questionnaire 3/1/2022   Do you ever feel afraid of your partner? N   Are you in a relationship with someone who physically or mentally threatens you? N   Is it safe for you to go home?  Y          Health Maintenance Due   Topic Date Due    COVID-19 Vaccine (3 - Booster for Simfinit series) 07/08/2021

## 2022-03-01 NOTE — PROGRESS NOTES
HISTORY OF PRESENT ILLNESS  Alessandra Grossman is a 42 y.o. female. Patient was seen today for annual physical checkup and age-appropriate health maintenance. Now she is working up [facility] assisted living facility. Concerned about severe allergic symptoms including red and itchy eyes, headaches and sore throat during all pollen season and fall time. Also concerned about frequent acid reflux and heartburn associated with diarrhea after spicy food. Health Maintenance  Immunizations:    Influenza: up to date. Tetanus: up to date. Gardasil: n/a. Cancer screening:   Cervical: reviewed guidelines, UTD. Breast: reviewed guidelines, reviewed SBE with her, UTD      Patient Care Team:  Maureen Snyder MD as PCP - General (Family Medicine)  Maureen Snyder MD as PCP - [address] [place] [address] Provider  Dania Marino DO (Neurology)  Idris Rosales MD as Physician (Obstetrics & Gynecology)  Iván Roach NP (Surgery)  Roldan Horvath MD (Cardiology)  Idris Rosales MD (Obstetrics & Gynecology)       The following sections were reviewed & updated as appropriate: PMH, PSH, FH, and SH. Review of Systems   Constitutional: Negative for chills, fever and malaise/fatigue. HENT: Positive for congestion and sore throat. Negative for ear pain and tinnitus. Eyes: Positive for redness. Negative for blurred vision, double vision, pain and discharge. Respiratory: Negative for cough, shortness of breath and wheezing. Cardiovascular: Negative for chest pain, palpitations and leg swelling. Gastrointestinal: Positive for abdominal pain and heartburn. Negative for blood in stool, constipation, diarrhea, nausea and vomiting. Genitourinary: Negative for dysuria, frequency, hematuria and urgency. Musculoskeletal: Negative for back pain, joint pain and myalgias. Skin: Negative for rash. Neurological: Positive for headaches. Negative for dizziness, tremors and seizures.    Endo/Heme/Allergies: Negative for polydipsia. Does not bruise/bleed easily. Psychiatric/Behavioral: Negative for depression and substance abuse. The patient is not nervous/anxious. Physical Exam  Vitals and nursing note reviewed. Constitutional:       Appearance: She is well-developed. She is not diaphoretic. HENT:      Head: Normocephalic and atraumatic. Right Ear: External ear normal.      Mouth/Throat:      Pharynx: No oropharyngeal exudate. Eyes:      General: No scleral icterus. Conjunctiva/sclera: Conjunctivae normal.      Pupils: Pupils are equal, round, and reactive to light. Neck:      Thyroid: No thyromegaly. Vascular: No JVD. Cardiovascular:      Rate and Rhythm: Normal rate and regular rhythm. Heart sounds: Normal heart sounds. No murmur heard. Pulmonary:      Effort: Pulmonary effort is normal.      Breath sounds: Normal breath sounds. No wheezing. Abdominal:      General: Bowel sounds are normal. There is no distension. Palpations: Abdomen is soft. There is no mass. Musculoskeletal:         General: No tenderness. Normal range of motion. Cervical back: Normal range of motion and neck supple. Lymphadenopathy:      Cervical: No cervical adenopathy. Skin:     General: Skin is warm and dry. Findings: No rash. Neurological:      Mental Status: She is alert and oriented to person, place, and time. Cranial Nerves: No cranial nerve deficit. Deep Tendon Reflexes: Reflexes are normal and symmetric. Reflexes normal.         ASSESSMENT and PLAN  Diagnoses and all orders for this visit:    1. Routine general medical examination at a health care facility  -     CBC WITH AUTOMATED DIFF; Future  -     HEMOGLOBIN A1C WITH EAG; Future  -     LIPID PANEL; Future  -     METABOLIC PANEL, COMPREHENSIVE; Future  -     TSH 3RD GENERATION; Future  -     URINALYSIS W/ RFLX MICROSCOPIC; Future    2.  Environmental and seasonal allergies  -     fexofenadine (ALLEGRA) 180 mg tablet; Take 1 Tablet by mouth daily. -     mometasone (NASONEX) 50 mcg/actuation nasal spray; 2 Sprays by Both Nostrils route daily. 3. Gastroesophageal reflux disease without esophagitis  -     omeprazole (PRILOSEC) 40 mg capsule; Take 1 Capsule by mouth daily. Strongly recommended to limit spicy food. Discussed lifestyle issues and health guidance given  Patient was given an after visit summary which includes diagnoses, vital signs, current medications, instructions and references & authorized prescriptions . Results of labs will be conveyed to patient, once available. Pt verbalized instructions I provided and expressed understanding of discussion that was held today. Follow-up and Dispositions    · Return in about 6 months (around 9/1/2022) for follow up. Please note that this dictation was completed with [employer], the computer voice recognition software. Quite often unanticipated grammatical, syntax, homophones, and other interpretive errors are inadvertently transcribed by the computer software. Please disregard these errors. Please excuse any errors that have escaped final proofreading. Thank you.

## 2022-03-01 NOTE — PATIENT INSTRUCTIONS
Dust Control for Children With Allergies: Care Instructions  Your Care Instructions     Many children are allergic to dust and dust mites. Dust mites are tiny bugs that get into bedding, furniture, and carpets. Dust mites are too small to be seen with the naked eye. When you sit on a chair, walk over a carpet, or lie on a bed, material produced by the mites is blown into the air. When breathed in, these can cause a runny nose, wheezing, and other symptoms. It is impossible to get rid of dust or dust mites completely, but reducing them in your house may improve your child's allergy symptoms. Keep in mind that some of these measures may be costly. Start by doing what you and your budget can manage. Since your child spends one-third of his or her day in bed, focus on your child's bedroom first.  Follow-up care is a key part of your child's treatment and safety. Be sure to make and go to all appointments, and call your doctor if your child is having problems. It's also a good idea to know your child's test results and keep a list of the medicines your child takes. How can you care for your child at home? · The most important thing you can do is decrease the dust around your child's bed:  ? Wash sheets, pillowcases, and other bedding every week in hot water. ? Use airtight, dust-proof covers for pillows, duvets, and mattresses. Avoid plastic covers because they tend to tear quickly and do not \"breathe. \" Wash according to the instructions. ? Remove extra blankets and pillows that your child does not need. ? Use blankets that are machine-washable. · Look for \"dust catchers\" in your child's room. Cloth-covered furniture, heavy drapes, flowers and houseplants, bookshelves, blinds, and stuffed animals collect dust and should be removed or wiped down with a wet cloth every week. ? Use a wooden or metal chair instead of an upholstered one.  ? If you need to cover the windows, use lightweight curtains.  Wash them every week with the bedding. · Mop floors and wipe down furniture, tables, and other hard surfaces with a moist cloth 1 or 2 times a week. · Change the air filter in your furnace every month. Use high-efficiency air filters. · Keep the windows closed. · Do not use window or attic fans, which draw dust into the air. · Do not use home humidifiers. They can help mites live longer. Your doctor can give you further instructions on how to control dust and mites. · Keep only clothes in the closet. Do not leave clothes on the floor. · If possible, replace [id number] carpet in bedrooms with tile, hardwood, or linoleum. You can use throw rugs as long as you wash them often. If you cannot remove carpeting, vacuum it at least 2 times a week. Use a vacuum  with a HEPA filter or a special double-thickness filter. Keep your child out of the room for several hours after you vacuum. Where can you learn more? Go to http://www.gray.com/  Enter C601 in the search box to learn more about \"Dust Control for Children With Allergies: Care Instructions. \"  Current as of: February 10, 2021               Content Version: 13.0  © 2006-2021 [employer]. Care instructions adapted under license by [employer] (which disclaims liability or warranty for this information). If you have questions about a medical condition or this instruction, always ask your healthcare professional. [place] [address] any warranty or liability for your use of this information.

## 2022-03-03 DIAGNOSIS — I10 HTN, GOAL BELOW 140/90: ICD-10-CM

## 2022-03-03 RX ORDER — AMLODIPINE BESYLATE 5 MG/1
TABLET ORAL
Qty: 60 TABLET | Refills: 0 | Status: SHIPPED | OUTPATIENT
Start: 2022-03-03 | End: 2022-08-15

## 2022-03-04 NOTE — PROGRESS NOTES
Zion Lu,  I have reviewed your results. Results for blood count, kidney and liver function, thyroid function, are normal.Cholesterol results are normal except borderline elevated triglycerides and sugar is also in early prediabetic range. No need to be on medicine but watch her diet strictly and exercise regularly. Urine is positive for large number of blood most likely contamination. I am not sure if you were in your menstrual cycle. If not let me know so we can do further work-up. Thanks

## 2022-03-07 NOTE — PROGRESS NOTES
Called patient. Two patient identifiers verified. Discussed lab results per provider's note. She Verbalized understanding.  Pt was on her period

## 2022-03-18 PROBLEM — I10 HTN, GOAL BELOW 140/90: Status: ACTIVE | Noted: 2018-03-08

## 2022-03-18 PROBLEM — Z22.7 LTBI (LATENT TUBERCULOSIS INFECTION): Status: ACTIVE | Noted: 2017-03-24

## 2022-03-19 PROBLEM — G43.909 MIGRAINE WITHOUT STATUS MIGRAINOSUS, NOT INTRACTABLE: Status: ACTIVE | Noted: 2017-03-24

## 2022-03-20 PROBLEM — E87.6 HYPOKALEMIA: Status: ACTIVE | Noted: 2019-10-24

## 2022-03-20 PROBLEM — G93.9 BRAIN LESION: Status: ACTIVE | Noted: 2017-03-24

## 2022-06-17 ENCOUNTER — OFFICE VISIT (OUTPATIENT)
Dept: FAMILY MEDICINE CLINIC | Age: 47
End: 2022-06-17
Payer: COMMERCIAL

## 2022-06-17 ENCOUNTER — NURSE TRIAGE (OUTPATIENT)
Dept: OTHER | Facility: CLINIC | Age: 47
End: 2022-06-17

## 2022-06-17 VITALS
DIASTOLIC BLOOD PRESSURE: 80 MMHG | RESPIRATION RATE: 18 BRPM | SYSTOLIC BLOOD PRESSURE: 108 MMHG | TEMPERATURE: 97.9 F | WEIGHT: 133.4 LBS | HEIGHT: 57 IN | HEART RATE: 98 BPM | BODY MASS INDEX: 28.78 KG/M2 | OXYGEN SATURATION: 98 %

## 2022-06-17 DIAGNOSIS — G44.209 ACUTE NON INTRACTABLE TENSION-TYPE HEADACHE: ICD-10-CM

## 2022-06-17 DIAGNOSIS — B34.9 VIRAL ILLNESS: Primary | ICD-10-CM

## 2022-06-17 PROBLEM — E11.9 TYPE 2 DIABETES MELLITUS (HCC): Status: ACTIVE | Noted: 2022-06-17

## 2022-06-17 PROCEDURE — 99213 OFFICE O/P EST LOW 20 MIN: CPT | Performed by: STUDENT IN AN ORGANIZED HEALTH CARE EDUCATION/TRAINING PROGRAM

## 2022-06-17 RX ORDER — IBUPROFEN 600 MG/1
600 TABLET ORAL
Qty: 30 TABLET | Refills: 0 | Status: SHIPPED | OUTPATIENT
Start: 2022-06-17 | End: 2022-08-18 | Stop reason: ALTCHOICE

## 2022-06-17 NOTE — PROGRESS NOTES
Chief Complaint   Patient presents with    Headache     APOX X 2 TO 3 DAYS.  Fatigue     1. \"Have you been to the ER, urgent care clinic since your last visit? Hospitalized since your last visit? \" No    2. \"Have you seen or consulted any other health care providers outside of the [address] [place] [address] since your last visit? \" No     3. For patients aged 39-70: Has the patient had a colonoscopy / FIT/ Cologuard? No      If the patient is female:    4. For patients aged 41-77: Has the patient had a mammogram within the past 2 years? Yes - no Care Gap present      5. For patients aged 21-65: Has the patient had a pap smear?  Yes - no Care Gap present

## 2022-06-17 NOTE — TELEPHONE ENCOUNTER
Received call from Italo Elias at [facility] with [employer] Complaint. Subjective: Caller states \"has a headache and no energy. At times will have chest pain. No chest pain today. \"     Current Symptoms: has a headache and no energy. Some times will have chest pain but most of the time headache and no energy  Headache is constant  No nausea or vomiting  No weakness or numbness    Onset: 2-3 days ago    Associated Symptoms: NA    Pain Severity: 7/10    Temperature: no fever    What has been tried: tylenol not helping    LMP: NA Pregnant: No    Recommended disposition: See in Office Today    Care advice provided, patient verbalizes understanding; denies any other questions or concerns; instructed to call back for any new or worsening symptoms. Patient/Caller agrees with recommended disposition; writer provided warm transfer to [facility] at [facility] for appointment scheduling    Attention Provider: Thank you for allowing me to participate in the care of your patient. The patient was connected to triage in response to information provided to the [facility]. Please do not respond through this encounter as the response is not directed to a shared pool.     Reason for Disposition   Patient wants to be seen    Protocols used: HEADACHE-ADULT-OH

## 2022-06-17 NOTE — PROGRESS NOTES
Bart Euceda  41 y.o. female  1975  [address] [place] [address]  [id number]     DAVY Sheehan 55       Chief Complaint: headache, fatigue  Source: self    Subjective  Bart Euceda is an 41 y.o. female who presents for 2-3 days of headache with fatigue. Having significant fatigue and having intermittent chest pain. No problems with vision. No COVID test over this last week. She is staying hydrated. 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 Temporal HA - she has taken tylenol without significant relief. Allergies - reviewed:   No Known Allergies      Medications - reviewed:   Current Outpatient Medications   Medication Sig    ibuprofen (MOTRIN) 600 mg tablet Take 1 Tablet by mouth every six (6) hours as needed for Pain.  amLODIPine (NORVASC) 5 mg tablet TAKE ONE TABLET BY MOUTH ONE TIME DAILY    metFORMIN ER (GLUCOPHAGE XR) 500 mg tablet TAKE 1 TABLET BY MOUTH DAILY WITH DINNER    acetaminophen (TYLENOL EXTRA STRENGTH) 500 mg tablet Take 2 Tabs by mouth every six (6) hours as needed for Pain. No current facility-administered medications for this visit.          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    Migraine without status migrainosus, not intractable 3/24/2017         Past Surgical History - reviewed:   Past Surgical History:   Procedure Laterality Date    HX  SECTION  2004    HX CYST REMOVAL Right 10/15/14    right hand    HX ORTHOPAEDIC  2014    GANGLION CYST REMOVED RIGHT HAND          Social History - reviewed:  Social History     Socioeconomic History    Marital status:      Spouse name: Not on file    Number of children: Not on file    Years of education: Not on file    Highest education level: Not on file   Occupational History    Not on file   Tobacco Use    Smoking status: Never Smoker    Smokeless tobacco: Never Used   Vaping Use    Vaping Use: Never used   Substance and Sexual Activity    Alcohol use: Never    Drug use: No    Sexual activity: Yes     Partners: Male     Birth control/protection: None   Other Topics Concern    Not on file   Social History Narrative    Not on file     Social Determinants of Health     Financial Resource Strain:     Difficulty of Paying Living Expenses: Not on file   Food Insecurity:     Worried About Running Out of Food in the Last Year: Not on file    Jann of Food in the Last Year: Not on file   Transportation Needs:     Lack of Transportation (Medical): Not on file    Lack of Transportation (Non-Medical):  Not on file   Physical Activity:     Days of Exercise per Week: Not on file    Minutes of Exercise per Session: Not on file   Stress:     Feeling of Stress : Not on file   Social Connections:     Frequency of Communication with Friends and Family: Not on file    Frequency of Social Gatherings with Friends and Family: Not on file    Attends Denominational Services: Not on file    Active Member of [address] [place] [address] [employer] or Organizations: Not on file    Attends Club or Organization Meetings: Not on file    Marital Status: Not on file   Intimate Partner Violence:     Fear of Current or Ex-Partner: Not on file    Emotionally Abused: Not on file    Physically Abused: Not on file    Sexually Abused: Not on file   Housing Stability:     Unable to Pay for Housing in the Last Year: Not on file    Number of [place] in the Last Year: Not on file    Unstable Housing in the Last Year: Not on file         Family History - reviewed:  Family History   Problem Relation Age of Onset  Diabetes Mother     Hypertension Mother     Stroke Father     Hypertension Father     No Known Problems Son     No Known Problems Brother     Anesth Problems Neg Hx     Heart Disease Neg Hx          Immunizations - reviewed:   Immunization History   Administered Date(s) Administered    COVID-19, Pfizer Purple top, DILUTE for use, 12+ yrs, 30mcg/0.3mL dose 01/18/2021, 02/08/2021, 02/07/2022    Hep B Vaccine 12/20/2013, 01/17/2014, 06/24/2014    Influenza Vaccine 02/19/2015    Influenza Vaccine (Quad) Mdck Pf (>2 Yrs Flucelvax QUAD 99115) 10/11/2020    Influenza Vaccine FileThis) PF (>6 Mo Flulaval, Fluarix, and >3 Yrs [address] [place] [address] Street, [place]uz[place] [address]) 09/19/2016, 10/01/2018, 10/01/2019    MMR 12/20/2013, 01/17/2014    Td 12/20/2013, 02/19/2015    Tdap 06/26/2014         Review of Systems   Constitutional: Positive for malaise/fatigue. Negative for chills, fever and weight loss. HENT: Positive for sore throat. Negative for congestion, ear pain, sinus pain and tinnitus. Eyes: Positive for discharge. Respiratory: Negative for cough, shortness of breath and wheezing. Cardiovascular: Negative for chest pain and palpitations. Musculoskeletal: Negative for myalgias. Neurological: Positive for headaches. Negative for dizziness. Physical Exam  Visit Vitals  /80 (BP 1 Location: Right upper arm, BP Patient Position: Sitting, BP Cuff Size: Adult)   Pulse 98   Temp 97.9 °F (36.6 °C) (Temporal)   Resp 18   Ht 4' 9\" (1.448 m)   Wt 133 lb 6.4 oz (60.5 kg)   LMP 06/11/2022 (Exact Date)   SpO2 98%   BMI 28.87 kg/m²       Physical Exam  Constitutional:       General: She is not in acute distress. Appearance: Normal appearance. She is normal weight. She is ill-appearing. She is not toxic-appearing or diaphoretic. HENT:      Head: Normocephalic and atraumatic. Nose: Rhinorrhea present. No congestion.       Mouth/Throat:      Mouth: Mucous membranes are moist.      Pharynx: No oropharyngeal exudate or posterior oropharyngeal erythema. Eyes:      Comments: Glassy eyed appearance, no drainage noted    Cardiovascular:      Rate and Rhythm: Normal rate and regular rhythm. Heart sounds: S1 normal and S2 normal. Heart sounds not distant. No murmur heard. Pulmonary:      Effort: No accessory muscle usage, prolonged expiration or respiratory distress. Breath sounds: Normal breath sounds. No stridor, decreased air movement or transmitted upper airway sounds. No decreased breath sounds, wheezing, rhonchi or rales. Neurological:      Mental Status: She is alert. Assessment/Plan    ICD-10-CM ICD-9-CM    1. Viral illness  B34.9 079.99    2. Acute non intractable tension-type headache  G44.209 339.10 ibuprofen (MOTRIN) 600 mg tablet       Ami Machado is an 41 y.o. female presenting for 3 days of fatigue, sore throat and HA. On exam appears unwell so high suspicion for COVID, flu or other viral illness. Advised to get COVID and flu testing ASAP. Treating HA with ibuprofen and encouraged propr hydration and rest. Work note through 6/20 provided and patient to report results of flu and COVID test back to office today in case antiviral treatment is warranted. 1. Viral illness  - COVID, Flu testing  - symptomatic treatment, rest, hydration    2. Acute non intractable tension-type headache  - ibuprofen (MOTRIN) 600 mg tablet; Take 1 Tablet by mouth every six (6) hours as needed for Pain. Dispense: 30 Tablet; Refill: 0      Patient informed to follow up: Follow-up and Dispositions    · Return in about 2 months (around 9/1/2022), or if symptoms worsen or fail to improve, for routine followup with Dr Geri Silverman. I have discussed the diagnosis with the patient and the intended plan as seen in the above orders. Patient verbalized understanding of the plan and agrees with the plan. The patient has received an after-visit summary and questions were answered concerning future plans.   I have discussed medication side effects and warnings with the patient as well. Informed patient to return to the office if new symptoms arise.       Pilar Cabral MD  [facility]

## 2022-06-17 NOTE — LETTER
NOTIFICATION RETURN TO WORK  6/17/2022 11:09 AM    Ms. Bart Euceda  [address]  [place] [address]      To Whom It May Concern:    Bart Euceda is currently under the care of DAVY Umanzor. She should remain out of work from 6/17 - 6/19 and may return to work 6/20 with a negative COVID test.     If there are questions or concerns please have the patient contact our office.         Sincerely,      Abiel Smart MD

## 2022-08-15 DIAGNOSIS — I10 HTN, GOAL BELOW 140/90: ICD-10-CM

## 2022-08-15 RX ORDER — AMLODIPINE BESYLATE 5 MG/1
TABLET ORAL
Qty: 60 TABLET | Refills: 0 | Status: SHIPPED | OUTPATIENT
Start: 2022-08-15

## 2022-08-18 ENCOUNTER — OFFICE VISIT (OUTPATIENT)
Dept: FAMILY MEDICINE CLINIC | Age: 47
End: 2022-08-18
Payer: COMMERCIAL

## 2022-08-18 ENCOUNTER — NURSE TRIAGE (OUTPATIENT)
Dept: OTHER | Facility: CLINIC | Age: 47
End: 2022-08-18

## 2022-08-18 VITALS
DIASTOLIC BLOOD PRESSURE: 82 MMHG | SYSTOLIC BLOOD PRESSURE: 124 MMHG | RESPIRATION RATE: 16 BRPM | WEIGHT: 131.4 LBS | HEIGHT: 57 IN | BODY MASS INDEX: 28.35 KG/M2 | TEMPERATURE: 98.9 F | HEART RATE: 94 BPM | OXYGEN SATURATION: 97 %

## 2022-08-18 DIAGNOSIS — J30.89 ENVIRONMENTAL AND SEASONAL ALLERGIES: ICD-10-CM

## 2022-08-18 DIAGNOSIS — K21.9 GASTROESOPHAGEAL REFLUX DISEASE WITHOUT ESOPHAGITIS: ICD-10-CM

## 2022-08-18 DIAGNOSIS — R10.13 ACUTE EPIGASTRIC PAIN: Primary | ICD-10-CM

## 2022-08-18 PROCEDURE — 99213 OFFICE O/P EST LOW 20 MIN: CPT | Performed by: FAMILY MEDICINE

## 2022-08-18 RX ORDER — MINERAL OIL
180 ENEMA (ML) RECTAL DAILY
Qty: 30 TABLET | Refills: 1 | Status: SHIPPED | OUTPATIENT
Start: 2022-08-18

## 2022-08-18 RX ORDER — OMEPRAZOLE 40 MG/1
40 CAPSULE, DELAYED RELEASE ORAL DAILY
Qty: 30 CAPSULE | Refills: 0 | Status: SHIPPED | OUTPATIENT
Start: 2022-08-18

## 2022-08-18 NOTE — TELEPHONE ENCOUNTER
Received call from [place] at [facility] with [employer] Complaint. Subjective: Caller states \"She is having back pain and has a ball in the middle of chest that is not letting pass gas. \"     Current Symptoms: intermittent upper back pain,  burning sensation. Burning sensation in the chest radiating to back. Same kind of of pain she had with reflux    Sour taste in taste. Pain worsens after eating. Onset: 4-5 days ago; intermittent, unchanged    Associated Symptoms: increased wakefulness decreased appetite    Pain Severity: 0-8/10; burning; intermittent    Temperature: none     What has been tried: Pepcid complete, Gas release pill- helped some. LMP: NA Pregnant: No    Recommended disposition:  Callback by PCP within 1 hour. [address] [place] [address]- spoke with Vikki Roach. Mode transferred patient to her. Care advice provided, patient verbalizes understanding; denies any other questions or concerns; instructed to call back for any new or worsening symptoms. Attention Provider: Thank you for allowing me to participate in the care of your patient. The patient was connected to triage in response to information provided to the [facility]. Please do not respond through this encounter as the response is not directed to a shared pool.         Reason for Disposition   Patient says chest pain feels exactly the same as previously diagnosed 'heartburn' and describes burning in chest and accompanying sour taste in mouth    Protocols used: Chest Pain-ADULT-[place]

## 2022-08-18 NOTE — PROGRESS NOTES
Chief Complaint   Patient presents with    Heartburn     Pain in chest that radiates to the back. Pt took gas relief medication but didn't help         1. \"Have you been to the ER, urgent care clinic since your last visit? Hospitalized since your last visit? \" No    2. \"Have you seen or consulted any other health care providers outside of the [address] [place] [address] since your last visit? \" No     3. For patients aged 39-70: Has the patient had a colonoscopy / FIT/ Cologuard? No      If the patient is female:    4. For patients aged 41-77: Has the patient had a mammogram within the past 2 years? No      5. For patients aged 21-65: Has the patient had a pap smear?  No         3 most recent PHQ Screens 8/18/2022   PHQ Not Done -   Little interest or pleasure in doing things Not at all   Feeling down, depressed, irritable, or hopeless Not at all   Total Score PHQ 2 0       Health Maintenance Due   Topic Date Due    Pneumococcal 0-64 years (1 - PCV) Never done    Foot Exam Q1  Never done    MICROALBUMIN Q1  Never done    Eye Exam Retinal or Dilated  Never done    Colorectal Cancer Screening Combo  Never done

## 2022-08-18 NOTE — PROGRESS NOTES
HISTORY OF PRESENT ILLNESS  Aaron Medina is a 41 y.o. female. Patient was seen today for concern of acute epigastric pain for last 2 to 3 days. She has history of GERD but this pain is different and is radiating to her back  HPI  Abdominal Pain  Patient complains of abdominal pain. The pain is described as aching, sharp, and shooting, and is 7/10 in intensity. Pain is located in the epigastric with radiation to back. Onset was 3 days ago. Symptoms have been unchanged since. Aggravating factors: pressure, spicy foods, fatty foods, and hot liquids. Alleviating factors: none. Associated symptoms: Heartburn. The patient denies chills, constipation, diarrhea, dysuria, fever, melena, nausea, and vomiting. Review of Systems   Constitutional:  Negative for chills, fever and malaise/fatigue. HENT:  Positive for congestion. Negative for ear pain, sore throat and tinnitus. Eyes:  Negative for blurred vision, double vision, pain and discharge. Respiratory:  Negative for cough, shortness of breath and wheezing. Cardiovascular:  Negative for chest pain, palpitations and leg swelling. Gastrointestinal:  Positive for abdominal pain. Negative for blood in stool, constipation, diarrhea, nausea and vomiting. Genitourinary:  Negative for dysuria, frequency, hematuria and urgency. Musculoskeletal:  Negative for back pain, joint pain and myalgias. Skin:  Negative for rash. Neurological:  Negative for dizziness, tremors, seizures and headaches. Endo/Heme/Allergies:  Negative for polydipsia. Does not bruise/bleed easily. Psychiatric/Behavioral:  Negative for depression and substance abuse. The patient is not nervous/anxious. Physical Exam  Vitals and nursing note reviewed. Constitutional:       Appearance: She is well-developed. She is not diaphoretic. HENT:      Head: Normocephalic and atraumatic.       Right Ear: External ear normal.      Mouth/Throat:      Mouth: Mucous membranes are moist. Pharynx: No oropharyngeal exudate. Eyes:      General: No scleral icterus. Conjunctiva/sclera: Conjunctivae normal.      Pupils: Pupils are equal, round, and reactive to light. Neck:      Thyroid: No thyromegaly. Vascular: No JVD. Cardiovascular:      Rate and Rhythm: Normal rate and regular rhythm. Heart sounds: Normal heart sounds. No murmur heard. Pulmonary:      Effort: Pulmonary effort is normal.      Breath sounds: Normal breath sounds. No wheezing. Abdominal:      General: Bowel sounds are normal. There is no distension. Palpations: Abdomen is soft. There is no mass. Tenderness: There is abdominal tenderness in the epigastric area. Musculoskeletal:         General: No tenderness. Normal range of motion. Cervical back: Normal range of motion and neck supple. Lymphadenopathy:      Cervical: No cervical adenopathy. Skin:     General: Skin is warm and dry. Findings: No rash. Neurological:      Mental Status: She is alert and oriented to person, place, and time. Cranial Nerves: No cranial nerve deficit. Deep Tendon Reflexes: Reflexes are normal and symmetric. Reflexes normal.   Psychiatric:         Mood and Affect: Mood normal.         Behavior: Behavior normal.       ASSESSMENT and PLAN  Diagnoses and all orders for this visit:    1. Acute epigastric pain  -     H PYLORI AG, STOOL; Future  -     LIPASE; Future  -     METABOLIC PANEL, COMPREHENSIVE; Future    2. Environmental and seasonal allergies  -     fexofenadine (ALLEGRA) 180 mg tablet; Take 1 Tablet by mouth daily. 3. Gastroesophageal reflux disease without esophagitis  -     omeprazole (PRILOSEC) 40 mg capsule; Take 1 Capsule by mouth daily. Patient with acute epigastric pain and history of GERD in the past.  Will check for lipase kidney and liver function and also will check for H. pylori. .  Discussed lifestyle issues and health guidance given  Patient was given an after visit summary which includes diagnoses, vital signs, current medications, instructions and references & authorized prescriptions . Results of labs will be conveyed to patient, once available. Pt verbalized instructions I provided and expressed understanding of discussion that was held today. Please note that this dictation was completed with [employer], the computer voice recognition software. Quite often unanticipated grammatical, syntax, homophones, and other interpretive errors are inadvertently transcribed by the computer software. Please disregard these errors. Please excuse any errors that have escaped final proofreading. Thank you.

## 2022-08-19 LAB
ALBUMIN SERPL-MCNC: 3.8 G/DL (ref 3.5–5)
ALBUMIN/GLOB SERPL: 1.1 {RATIO} (ref 1.1–2.2)
ALP SERPL-CCNC: 84 U/L (ref 45–117)
ALT SERPL-CCNC: 24 U/L (ref 12–78)
ANION GAP SERPL CALC-SCNC: 6 MMOL/L (ref 5–15)
AST SERPL-CCNC: 13 U/L (ref 15–37)
BILIRUB SERPL-MCNC: 0.4 MG/DL (ref 0.2–1)
BUN SERPL-MCNC: 10 MG/DL (ref 6–20)
BUN/CREAT SERPL: 18 (ref 12–20)
CALCIUM SERPL-MCNC: 9 MG/DL (ref 8.5–10.1)
CHLORIDE SERPL-SCNC: 106 MMOL/L (ref 97–108)
CO2 SERPL-SCNC: 25 MMOL/L (ref 21–32)
CREAT SERPL-MCNC: 0.57 MG/DL (ref 0.55–1.02)
GLOBULIN SER CALC-MCNC: 3.4 G/DL (ref 2–4)
GLUCOSE SERPL-MCNC: 123 MG/DL (ref 65–100)
LIPASE SERPL-CCNC: 139 U/L (ref 73–393)
POTASSIUM SERPL-SCNC: 3.8 MMOL/L (ref 3.5–5.1)
PROT SERPL-MCNC: 7.2 G/DL (ref 6.4–8.2)
SODIUM SERPL-SCNC: 137 MMOL/L (ref 136–145)

## 2022-08-19 NOTE — PROGRESS NOTES
Zion Hills,  I have reviewed your results and results for kidney and liver function as well as screening for pancreatitis are normal.  Please take omeprazole 40 mg at nighttime and complete stool check for H. pylori bacteria.   I will reach out to you once I get your result for stool test.  Thanks

## 2022-08-22 LAB
H PYLORI AG STL QL IA: NEGATIVE
SPECIMEN SOURCE: NORMAL

## 2022-08-22 NOTE — PROGRESS NOTES
Zion Sellers,  Your result for H. pylori has been negative. So no need to be on antibiotic. You can continue with current omeprazole for 1 month and then change back to famotidine. Please watch her diet for spicy food and let me know if you have any questions.   thanks

## 2022-10-13 ENCOUNTER — TELEPHONE (OUTPATIENT)
Dept: FAMILY MEDICINE CLINIC | Age: 47
End: 2022-10-13

## 2022-10-13 NOTE — TELEPHONE ENCOUNTER
Patient called trying to get into see provider on next Monday for right heel pain. No appointment till October 31st would like for nurse to call.     Requesting a call back    Best call [facility]#[phone]

## 2022-10-17 ENCOUNTER — TELEPHONE (OUTPATIENT)
Dept: FAMILY MEDICINE CLINIC | Age: 47
End: 2022-10-17

## 2022-10-17 NOTE — TELEPHONE ENCOUNTER
Patient was left an voice message informing that was calling to get more information about heel pain. It was suggested to schedule with any available provider if pain is severe, urgent care or a podiatrist.  Patient is currently scheduled for 11/01/2022. Informed to contact us to review schedule.

## 2022-10-17 NOTE — TELEPHONE ENCOUNTER
Pt's spouse was in the office to accompany his mother to an appt and made an appt for his wife (pt). She is scheduled for Nov 1st with Dr. Milton Muñoz, but is in great pain. If there is any earlier time Dr. Milton Muñoz can see her, please give pt a call at [phone] or [phone]. OK to leave VM per . Thank you!  SC PAFP 10.17.22

## 2022-10-17 NOTE — TELEPHONE ENCOUNTER
Chief Complaint   Patient presents with    Foot Pain     Heel pain      Patient was left an voice message informing that was calling to get more information about heel pain. It was suggested to schedule with any available provider if pain is severe, urgent care or a podiatrist.  Patient is currently scheduled for 11/01/2022. Informed to contact us to review schedule.   Karla Chao LPN

## 2022-10-25 NOTE — TELEPHONE ENCOUNTER
Called & left generic , for the pt to [facility]. Pt c/o Right Heel Pain and is trying to schedule an earlier appt, than 11/01/22, with Dr. Donell Bautista.     Called both phone numbers listed: [phone] & [phone]

## 2022-11-01 ENCOUNTER — OFFICE VISIT (OUTPATIENT)
Dept: FAMILY MEDICINE CLINIC | Age: 47
End: 2022-11-01
Payer: COMMERCIAL

## 2022-11-01 VITALS
BODY MASS INDEX: 28.69 KG/M2 | OXYGEN SATURATION: 98 % | HEIGHT: 57 IN | HEART RATE: 85 BPM | WEIGHT: 133 LBS | SYSTOLIC BLOOD PRESSURE: 120 MMHG | TEMPERATURE: 97.3 F | DIASTOLIC BLOOD PRESSURE: 82 MMHG | RESPIRATION RATE: 16 BRPM

## 2022-11-01 DIAGNOSIS — M72.2 PLANTAR FASCIITIS OF RIGHT FOOT: Primary | ICD-10-CM

## 2022-11-01 DIAGNOSIS — R53.83 OTHER FATIGUE: ICD-10-CM

## 2022-11-01 DIAGNOSIS — Z23 ENCOUNTER FOR IMMUNIZATION: ICD-10-CM

## 2022-11-01 DIAGNOSIS — I10 HTN, GOAL BELOW 140/90: ICD-10-CM

## 2022-11-01 DIAGNOSIS — R73.03 PREDIABETES: ICD-10-CM

## 2022-11-01 DIAGNOSIS — E55.9 VITAMIN D DEFICIENCY: ICD-10-CM

## 2022-11-01 DIAGNOSIS — M22.2X1 PATELLOFEMORAL PAIN SYNDROME OF RIGHT KNEE: ICD-10-CM

## 2022-11-01 PROCEDURE — 3074F SYST BP LT 130 MM HG: CPT | Performed by: FAMILY MEDICINE

## 2022-11-01 PROCEDURE — 3078F DIAST BP <80 MM HG: CPT | Performed by: FAMILY MEDICINE

## 2022-11-01 PROCEDURE — 99214 OFFICE O/P EST MOD 30 MIN: CPT | Performed by: FAMILY MEDICINE

## 2022-11-01 PROCEDURE — 90686 IIV4 VACC NO PRSV 0.5 ML IM: CPT | Performed by: FAMILY MEDICINE

## 2022-11-01 PROCEDURE — 90471 IMMUNIZATION ADMIN: CPT | Performed by: FAMILY MEDICINE

## 2022-11-01 RX ORDER — DICLOFENAC SODIUM 75 MG/1
75 TABLET, DELAYED RELEASE ORAL 2 TIMES DAILY
Qty: 30 TABLET | Refills: 0 | Status: SHIPPED | OUTPATIENT
Start: 2022-11-01

## 2022-11-01 NOTE — PROGRESS NOTES
Chief Complaint   Patient presents with    Foot Pain     Right foot radiating up leg . Pain level elevates to 8 especially when working . Symptoms occurring for one month       Knee Pain     Right knee    Immunization/Injection     INFLUENZA     1. Have you been to the ER, urgent care clinic since your last visit? Hospitalized since your last visit? No    2. Have you seen or consulted any other health care providers outside of the [address] [place] [address] since your last visit? Include any pap smears or colon screening. No    Fred Marie  is a 41 y.o.  female  who present for INFLUENZA  immunizations/injections. He/she denies any symptoms , reactions or allergies that would exclude them from being immunized today. Risks and adverse reactions were discussed and the VIS was given if applicable to them. All questions were addressed. He/She was observed for 10  min post injection. There were no reactions observed.     Florentino Dash LPN

## 2022-11-01 NOTE — PROGRESS NOTES
HISTORY OF PRESENT ILLNESS  Matthew Crockett is a 41 y.o. female. Patient was seen today for follow-up on hypertension and prediabetes as well as new concern of pain in right knee and right heel for few days. HPI  Cardiovascular Review  The patient has hypertension and prediabetes. She reports taking medications as instructed, no medication side effects noted, no TIA's, no chest pain on exertion, no dyspnea on exertion, no swelling of ankles, no orthostatic dizziness or lightheadedness, no orthopnea or paroxysmal nocturnal dyspnea, no palpitations. Diet and Lifestyle: generally follows a low fat low cholesterol diet, generally follows a low sodium diet, follows a diabetic diet regularly, exercises regularly, nonsmoker. Lab review: labs reviewed and discussed with patient. Medicines: Amlodipine 5 mg  Knee Pain  Patient complains of right knee pain. Onset of the symptoms was several days ago. Inciting event: none known. Current symptoms include pain location: right sided: anterior, medial, severity of pain = mild, and swelling: not present. Denies effusion, ecchymosis, knee giving out, knee locking, crepitus sensation, foreign body sensation, and popping sensation. Aggravating symptoms: going up and down stairs, walking. Patient's overall course: symptoms have progressed to a point and plateaued. Patient has had no prior knee problems. Patient denies melena, fever. Evaluation to date: none. Treatment to date: none. Pain Review:  She presents do to pain of her right foot that is secondary to no known injury and started a few days ago. Since it started her pain has not changed. She describes the pain as sharp, shooting, intermittent it is intermittent. The pain radiates: no where. She denies weakness, denies numbness, denies paresthesias, denies stiffness, . Exacerbating factors identifiable by patient are walking, walking after period of inactivity. She has tried the following: nothing.       Review of Systems   Constitutional:  Positive for malaise/fatigue. Negative for chills and fever. HENT:  Negative for congestion, ear pain, sore throat and tinnitus. Eyes:  Negative for blurred vision, double vision, pain and discharge. Respiratory:  Negative for cough, shortness of breath and wheezing. Cardiovascular:  Negative for chest pain, palpitations and leg swelling. Gastrointestinal:  Negative for abdominal pain, blood in stool, constipation, diarrhea, nausea and vomiting. Genitourinary:  Negative for dysuria, frequency, hematuria and urgency. Musculoskeletal:  Negative for back pain, joint pain and myalgias. Right knee and right foot pain   Skin:  Negative for rash. Neurological:  Negative for dizziness, tremors, seizures and headaches. Endo/Heme/Allergies:  Negative for polydipsia. Does not bruise/bleed easily. Psychiatric/Behavioral:  Negative for depression and substance abuse. The patient is not nervous/anxious. Physical Exam  Vitals and nursing note reviewed. Constitutional:       Appearance: She is well-developed. She is not diaphoretic. HENT:      Head: Normocephalic and atraumatic. Right Ear: External ear normal.      Mouth/Throat:      Mouth: Mucous membranes are moist.      Pharynx: No oropharyngeal exudate. Eyes:      General: No scleral icterus. Conjunctiva/sclera: Conjunctivae normal.      Pupils: Pupils are equal, round, and reactive to light. Neck:      Thyroid: No thyromegaly. Vascular: No JVD. Cardiovascular:      Rate and Rhythm: Normal rate and regular rhythm. Heart sounds: Normal heart sounds. No murmur heard. Pulmonary:      Effort: Pulmonary effort is normal.      Breath sounds: Normal breath sounds. No wheezing. Abdominal:      General: Bowel sounds are normal. There is no distension. Palpations: Abdomen is soft. There is no mass. Musculoskeletal:         General: Normal range of motion.       Cervical back: Normal range of motion and neck supple. Right knee: Tenderness present over the medial joint line. Right foot: Tenderness present. No bony tenderness. Feet:    Lymphadenopathy:      Cervical: No cervical adenopathy. Skin:     General: Skin is warm and dry. Findings: No rash. Neurological:      Mental Status: She is alert and oriented to person, place, and time. Cranial Nerves: No cranial nerve deficit. Deep Tendon Reflexes: Reflexes are normal and symmetric. Reflexes normal.   Psychiatric:         Mood and Affect: Mood normal.         Behavior: Behavior normal.       ASSESSMENT and PLAN  Diagnoses and all orders for this visit:    1. Plantar fasciitis of right foot  -     diclofenac EC (VOLTAREN) 75 mg EC tablet; Take 1 Tablet by mouth two (2) times a day. 2. Patellofemoral pain syndrome of right knee  -     diclofenac EC (VOLTAREN) 75 mg EC tablet; Take 1 Tablet by mouth two (2) times a day. 3. Encounter for immunization  -     INFLUENZA, FLUARIX, FLULAVAL, FLUZONE (AGE 6 MO+), AFLURIA(AGE 3Y+) IM, PF, 0.5 ML    4. HTN, goal below 909/76  -     METABOLIC PANEL, COMPREHENSIVE; Future    5. Prediabetes  -     METABOLIC PANEL, COMPREHENSIVE; Future    6. Other fatigue  -     CBC WITH AUTOMATED DIFF; Future    7. Vitamin D deficiency  -     VITAMIN D, 25 HYDROXY; Future  Discussed stretches for plantar fasciitis, demonstration given, exercises were printed. Exercises for patellofemoral syndrome was discussed and demonstrated. Recommended to use over-the-counter plantar fasciitis insole. Will do appropriate blood work as she is complaining of fatigue and low energy. Discussed lifestyle issues and health guidance given  Patient was given an after visit summary which includes diagnoses, vital signs, current medications, instructions and references & authorized prescriptions . Results of labs will be conveyed to patient, once available.   Pt verbalized instructions I provided and expressed understanding of discussion that was held today. Follow-up and Dispositions    Return in about 6 months (around 5/1/2023) for fasting, physical.         Please note that this dictation was completed with [employer], the computer voice recognition software. Quite often unanticipated grammatical, syntax, homophones, and other interpretive errors are inadvertently transcribed by the computer software. Please disregard these errors. Please excuse any errors that have escaped final proofreading. Thank you.

## 2022-11-02 LAB
25(OH)D3 SERPL-MCNC: 24.1 NG/ML (ref 30–100)
ALBUMIN SERPL-MCNC: 3.8 G/DL (ref 3.5–5)
ALBUMIN/GLOB SERPL: 1 {RATIO} (ref 1.1–2.2)
ALP SERPL-CCNC: 92 U/L (ref 45–117)
ALT SERPL-CCNC: 27 U/L (ref 12–78)
ANION GAP SERPL CALC-SCNC: 6 MMOL/L (ref 5–15)
AST SERPL-CCNC: 11 U/L (ref 15–37)
BASOPHILS # BLD: 0 K/UL (ref 0–0.1)
BASOPHILS NFR BLD: 0 % (ref 0–1)
BILIRUB SERPL-MCNC: 0.4 MG/DL (ref 0.2–1)
BUN SERPL-MCNC: 12 MG/DL (ref 6–20)
BUN/CREAT SERPL: 24 (ref 12–20)
CALCIUM SERPL-MCNC: 9.7 MG/DL (ref 8.5–10.1)
CHLORIDE SERPL-SCNC: 107 MMOL/L (ref 97–108)
CO2 SERPL-SCNC: 25 MMOL/L (ref 21–32)
CREAT SERPL-MCNC: 0.51 MG/DL (ref 0.55–1.02)
DIFFERENTIAL METHOD BLD: NORMAL
EOSINOPHIL # BLD: 0.1 K/UL (ref 0–0.4)
EOSINOPHIL NFR BLD: 1 % (ref 0–7)
ERYTHROCYTE [DISTWIDTH] IN BLOOD BY AUTOMATED COUNT: 12.3 % (ref 11.5–14.5)
GLOBULIN SER CALC-MCNC: 3.9 G/DL (ref 2–4)
GLUCOSE SERPL-MCNC: 112 MG/DL (ref 65–100)
HCT VFR BLD AUTO: 42 % (ref 35–47)
HGB BLD-MCNC: 14.1 G/DL (ref 11.5–16)
IMM GRANULOCYTES # BLD AUTO: 0 K/UL (ref 0–0.04)
IMM GRANULOCYTES NFR BLD AUTO: 0 % (ref 0–0.5)
LYMPHOCYTES # BLD: 2.2 K/UL (ref 0.8–3.5)
LYMPHOCYTES NFR BLD: 30 % (ref 12–49)
MCH RBC QN AUTO: 30.7 PG (ref 26–34)
MCHC RBC AUTO-ENTMCNC: 33.6 G/DL (ref 30–36.5)
MCV RBC AUTO: 91.5 FL (ref 80–99)
MONOCYTES # BLD: 0.5 K/UL (ref 0–1)
MONOCYTES NFR BLD: 6 % (ref 5–13)
NEUTS SEG # BLD: 4.6 K/UL (ref 1.8–8)
NEUTS SEG NFR BLD: 63 % (ref 32–75)
NRBC # BLD: 0 K/UL (ref 0–0.01)
NRBC BLD-RTO: 0 PER 100 WBC
PLATELET # BLD AUTO: 207 K/UL (ref 150–400)
PMV BLD AUTO: 11.4 FL (ref 8.9–12.9)
POTASSIUM SERPL-SCNC: 3.8 MMOL/L (ref 3.5–5.1)
PROT SERPL-MCNC: 7.7 G/DL (ref 6.4–8.2)
RBC # BLD AUTO: 4.59 M/UL (ref 3.8–5.2)
SODIUM SERPL-SCNC: 138 MMOL/L (ref 136–145)
WBC # BLD AUTO: 7.4 K/UL (ref 3.6–11)

## 2022-11-02 NOTE — PROGRESS NOTES
Zion Boone,  I have reviewed your results. Blood count is within normal and negative for any anemia or infection. Kidney and liver functions are normal.  Random sugar is also in the very normal range. Vitamin D stays low, please take over-the-counter vitamin D3 2000 units daily. Hopefully your symptoms will improve with improvement in your stress.   Thanks

## 2022-11-02 NOTE — PROGRESS NOTES
Results discussed with patient by Dr. Prachi Ramos via [employer]. Letter sent with results and instructions as follow up.   Ruben Casillas, MAGGIEN

## 2023-01-23 ENCOUNTER — IMMUNIZATION (OUTPATIENT)
Dept: PHARMACY | Age: 48
End: 2023-01-23

## 2023-02-23 ENCOUNTER — OFFICE VISIT (OUTPATIENT)
Dept: FAMILY MEDICINE CLINIC | Age: 48
End: 2023-02-23
Payer: COMMERCIAL

## 2023-02-23 VITALS
BODY MASS INDEX: 28.91 KG/M2 | DIASTOLIC BLOOD PRESSURE: 80 MMHG | WEIGHT: 134 LBS | HEIGHT: 57 IN | SYSTOLIC BLOOD PRESSURE: 129 MMHG | TEMPERATURE: 98.3 F | HEART RATE: 97 BPM | OXYGEN SATURATION: 98 % | RESPIRATION RATE: 16 BRPM

## 2023-02-23 DIAGNOSIS — F51.01 PRIMARY INSOMNIA: ICD-10-CM

## 2023-02-23 DIAGNOSIS — J30.9 ALLERGIC SINUSITIS: Primary | ICD-10-CM

## 2023-02-23 DIAGNOSIS — F41.1 GENERALIZED ANXIETY DISORDER: ICD-10-CM

## 2023-02-23 DIAGNOSIS — J06.9 VIRAL URI WITH COUGH: ICD-10-CM

## 2023-02-23 PROCEDURE — 3079F DIAST BP 80-89 MM HG: CPT | Performed by: FAMILY MEDICINE

## 2023-02-23 PROCEDURE — 3074F SYST BP LT 130 MM HG: CPT | Performed by: FAMILY MEDICINE

## 2023-02-23 PROCEDURE — 99213 OFFICE O/P EST LOW 20 MIN: CPT | Performed by: FAMILY MEDICINE

## 2023-02-23 RX ORDER — HYDROXYZINE 25 MG/1
25 TABLET, FILM COATED ORAL
Qty: 30 TABLET | Refills: 0 | Status: SHIPPED | OUTPATIENT
Start: 2023-02-23 | End: 2023-03-25

## 2023-02-23 RX ORDER — FLUTICASONE PROPIONATE 50 MCG
SPRAY, SUSPENSION (ML) NASAL
COMMUNITY
Start: 2023-01-28

## 2023-02-23 RX ORDER — BENZONATATE 200 MG/1
200 CAPSULE ORAL
Qty: 20 CAPSULE | Refills: 0 | Status: SHIPPED | OUTPATIENT
Start: 2023-02-23 | End: 2023-03-02

## 2023-02-23 NOTE — PROGRESS NOTES
HISTORY OF PRESENT ILLNESS  Maykel Christian is a 41 y.o. female. Patient was seen today for sinus congestion, cough, fatigue and insomnia. HPI  URI Review  Nash Arthur returns to clinic today to talk about: URI symptoms, headache, sinus congestion, post nasal drip, and dry cough that started gradual and several days ago, and is unchanged since that time. She reports cough described as dry, nocturnal, painful, hoarse. She denies a history of: fever, chest congestion, wheezing, SOB, JOHNSON, and myalgias. Treatments have included: decongestants, nasal steroids, which have been not very effective. Relevant PMH: recurrent sinusitis and allergic rhinitis. Patient reports sick contacts: no.    Patient was evaluated at [facility] about 1 month ago. Her flu test and COVID test were negative and patient was informed that she has allergies and was recommended to use fluticasone nasal spray and to use Allegra as well as Mucinex DM. Patient completed Mucinex DM and stopped taking Flonase and Allegra after 1 week. Her symptoms improved initially and now she is having again persistent dry cough. Insomnia:  ONSET: problem is longstanding  SEEN PREVIOUSLY: several months ago by Provider/dept: Me  DESCRIPTION OF SX:  patient estimates 4 hours of sleep per nite. difficulty initiating sleep.  frequent awakening  early morning awakening  non-restorative sleep  ASSOCIATED ISSUES: situational anxiety related to her [place] moving to [place]. She has also noticed significant stress at her job recently  chronic generalized anxiety  has daytime somnolance  DENIES: depression and ETOH overuse  TREATMENT TO DATE: none        Review of Systems   Constitutional:  Negative for chills, fever and malaise/fatigue. HENT:  Positive for congestion and sinus pain. Negative for ear pain, sore throat and tinnitus. Eyes:  Negative for blurred vision, double vision, pain and discharge. Respiratory:  Positive for cough.  Negative for shortness of breath and wheezing. Cardiovascular:  Negative for chest pain, palpitations and leg swelling. Gastrointestinal:  Negative for abdominal pain, blood in stool, constipation, diarrhea, nausea and vomiting. Genitourinary:  Negative for dysuria, frequency, hematuria and urgency. Musculoskeletal:  Negative for back pain, joint pain and myalgias. Skin:  Negative for rash. Neurological:  Negative for dizziness, tremors, seizures and headaches. Endo/Heme/Allergies:  Negative for polydipsia. Does not bruise/bleed easily. Psychiatric/Behavioral:  Negative for depression and substance abuse. The patient has insomnia. The patient is not nervous/anxious. Physical Exam  Vitals and nursing note reviewed. Constitutional:       General: She is not in acute distress. Appearance: She is well-developed. She is not diaphoretic. HENT:      Head: Normocephalic and atraumatic. Right Ear: Tympanic membrane and external ear normal. No decreased hearing noted. No drainage. No middle ear effusion. Tympanic membrane is not injected. Left Ear: Tympanic membrane normal. No decreased hearing noted. No drainage. No middle ear effusion. Tympanic membrane is not injected. Nose: Mucosal edema and congestion present. No rhinorrhea. Right Sinus: Maxillary sinus tenderness and frontal sinus tenderness present. Left Sinus: Maxillary sinus tenderness and frontal sinus tenderness present. Comments: Bilateral small nasal polyps     Mouth/Throat:      Mouth: Mucous membranes are moist.      Pharynx: Uvula midline. Posterior oropharyngeal erythema present. No oropharyngeal exudate. Eyes:      General: No scleral icterus. Conjunctiva/sclera: Conjunctivae normal.      Pupils: Pupils are equal, round, and reactive to light. Neck:      Thyroid: No thyromegaly. Vascular: No JVD. Cardiovascular:      Rate and Rhythm: Normal rate and regular rhythm. Heart sounds: Normal heart sounds.  No murmur heard. Pulmonary:      Effort: Pulmonary effort is normal.      Breath sounds: Normal breath sounds. No wheezing or rales. Abdominal:      General: Bowel sounds are normal. There is no distension. Palpations: Abdomen is soft. There is no mass. Musculoskeletal:         General: No tenderness. Normal range of motion. Cervical back: Normal range of motion and neck supple. Lymphadenopathy:      Head:      Right side of head: No tonsillar adenopathy. Left side of head: No tonsillar adenopathy. Cervical: No cervical adenopathy. Skin:     General: Skin is warm and dry. Findings: No rash. Neurological:      Mental Status: She is alert and oriented to person, place, and time. Cranial Nerves: No cranial nerve deficit. Deep Tendon Reflexes: Reflexes are normal and symmetric. Reflexes normal.   Psychiatric:         Mood and Affect: Mood normal.         Behavior: Behavior normal.       ASSESSMENT and PLAN  Diagnoses and all orders for this visit:    1. Allergic sinusitis  -     benzonatate (TESSALON) 200 mg capsule; Take 1 Capsule by mouth three (3) times daily as needed for Cough for up to 7 days. 2. Viral URI with cough  -     benzonatate (TESSALON) 200 mg capsule; Take 1 Capsule by mouth three (3) times daily as needed for Cough for up to 7 days. 3. Generalized anxiety disorder  -     hydrOXYzine HCL (ATARAX) 25 mg tablet; Take 1 Tablet by mouth nightly for 30 days. 4. Primary insomnia  Today we had a long discussion on sleep health and sleep hygiene. Discussed relaxation techniques and breathing techniques before sleep as well as bedroom hygiene. Discussed lifestyle issues and health guidance given  Patient was given an after visit summary which includes diagnoses, vital signs, current medications, instructions and references & authorized prescriptions . Results of labs will be conveyed to patient, once available.   Pt verbalized instructions I provided and expressed understanding of discussion that was held today. Please note that this dictation was completed with [employer], the computer voice recognition software. Quite often unanticipated grammatical, syntax, homophones, and other interpretive errors are inadvertently transcribed by the computer software. Please disregard these errors. Please excuse any errors that have escaped final proofreading. Thank you.

## 2023-02-23 NOTE — PROGRESS NOTES
Chief Complaint   Patient presents with    Cough     Pt had a dry cough and stuffy nose x 1 month. [facility], it was negative. Pt was told that it is allegeries. Pt still has the cough although she took otc Mucinex    Fatigue     Pt feels tired in the morning when she wakes up    Indigestion     Pt feels whatever she eats doesn't digest well. If patient eats meat or chicken she immediately goes to the bathroom         1. \"Have you been to the ER, urgent care clinic since your last visit? Hospitalized since your last visit? \" Yes When: [facility] Reason for visit: cough and stuffy nose    2. \"Have you seen or consulted any other health care providers outside of the [address] [place] [address] since your last visit? \" No     3. For patients aged 39-70: Has the patient had a colonoscopy / FIT/ Cologuard? NA - based on age      If the patient is female:    4. For patients aged 41-77: Has the patient had a mammogram within the past 2 years? No      5. For patients aged 21-65: Has the patient had a pap smear?  No       Financial Resource Strain: Low Risk     Difficulty of Paying Living Expenses: Not very hard      Food Insecurity: Food Insecurity Present    Worried About Running Out of Food in the Last Year: Sometimes true    Ran Out of Food in the Last Year: Sometimes true        3 most recent PHQ Screens 11/1/2022   PHQ Not Done -   Little interest or pleasure in doing things Not at all   Feeling down, depressed, irritable, or hopeless Not at all   Total Score PHQ 2 0   Trouble falling or staying asleep, or sleeping too much Not at all   Feeling tired or having little energy Not at all   Poor appetite, weight loss, or overeating Not at all   Feeling bad about yourself - or that you are a failure or have let yourself or your family down Not at all   Trouble concentrating on things such as school, work, reading, or watching TV Not at all   Moving or speaking so slowly that other people could have noticed; or the opposite being so fidgety that others notice Not at all   Thoughts of being better off dead, or hurting yourself in some way Not at all   PHQ 9 Score 0   How difficult have these problems made it for you to do your work, take care of your home and get along with others Not difficult at all       Health Maintenance Due   Topic Date Due    Colorectal Cancer Screening Combo  Never done    Cervical cancer screen  02/07/2023    A1C test (Diabetic or Prediabetic)  03/01/2023    Breast Cancer Screen Mammogram  03/10/2023

## 2023-03-01 DIAGNOSIS — I10 HTN, GOAL BELOW 140/90: ICD-10-CM

## 2023-03-01 RX ORDER — AMLODIPINE BESYLATE 5 MG/1
TABLET ORAL
Qty: 60 TABLET | Refills: 0 | Status: SHIPPED | OUTPATIENT
Start: 2023-03-01

## 2023-03-02 ENCOUNTER — OFFICE VISIT (OUTPATIENT)
Dept: FAMILY MEDICINE CLINIC | Age: 48
End: 2023-03-02

## 2023-03-02 ENCOUNTER — HOSPITAL ENCOUNTER (OUTPATIENT)
Facility: HOSPITAL | Age: 48
Setting detail: SPECIMEN
Discharge: HOME | End: 2023-03-02
Payer: COMMERCIAL

## 2023-03-02 VITALS
DIASTOLIC BLOOD PRESSURE: 81 MMHG | RESPIRATION RATE: 16 BRPM | TEMPERATURE: 99.7 F | WEIGHT: 133.2 LBS | SYSTOLIC BLOOD PRESSURE: 121 MMHG | HEART RATE: 80 BPM | BODY MASS INDEX: 28.74 KG/M2 | HEIGHT: 57 IN | OXYGEN SATURATION: 99 %

## 2023-03-02 DIAGNOSIS — Z12.11 SCREEN FOR COLON CANCER: ICD-10-CM

## 2023-03-02 DIAGNOSIS — Z00.00 ROUTINE GENERAL MEDICAL EXAMINATION AT A HEALTH CARE FACILITY: Primary | ICD-10-CM

## 2023-03-02 DIAGNOSIS — Z01.419 WELL WOMAN EXAM WITH ROUTINE GYNECOLOGICAL EXAM: ICD-10-CM

## 2023-03-02 DIAGNOSIS — Z12.31 ENCOUNTER FOR SCREENING MAMMOGRAM FOR MALIGNANT NEOPLASM OF BREAST: ICD-10-CM

## 2023-03-02 PROCEDURE — 87624 HPV HI-RISK TYP POOLED RSLT: CPT

## 2023-03-02 PROCEDURE — 88175 CYTOPATH C/V AUTO FLUID REDO: CPT

## 2023-03-02 NOTE — PROGRESS NOTES
HISTORY OF PRESENT ILLNESS  Johnny Nunez is a 41 y.o. female. Patient was seen today for annual physical checkup as well as well woman exam and to update her health maintenance. She is planning to move to [place] in few months. HPI  Health Maintenance  Immunizations:   Influenza: up to date. Tetanus: up to date. Shingles: Not applicable. Pneumonia: n/a. Cancer screening:   Cervical: reviewed guidelines, not UTD - will do today. Breast: reviewed guidelines, order placed. Colon: reviewed guidelines, opted for Cologuard. Bone Density: Reviewed guidelines    Routine Gyn Exam  Patient here for routine exam.  Current Complaints: None. Personal Health Questionnaire Reviewed: yes     Gynecologic History  Patient's last menstrual period was 2023. Contraception: none  Last Pap: 2020  Results: normal  Last Mammogram:   Results: normal    Obstetric History  : 1  Para: 1  AB: 0    Patient Care Team:  Patti Otero MD as PCP - General (Family Medicine)  Patti Otero MD as PCP - [facility] Empaneled Provider  Jaylon Garcia DO (Neurology)  Bess Bianchi MD as Physician (Obstetrics & Gynecology)  Thi Tarango NP (Surgery Physician)  Daniela Lara MD (Cardiovascular Disease Physician)  Bess Bianchi MD (Obstetrics & Gynecology)      The following sections were reviewed & updated as appropriate: PMH, PSH, FH, and SH. Review of Systems   Constitutional:  Negative for chills, fever and malaise/fatigue. HENT:  Negative for congestion, ear pain, sore throat and tinnitus. Eyes:  Negative for blurred vision, double vision, pain and discharge. Respiratory:  Negative for cough, shortness of breath and wheezing. Cardiovascular:  Negative for chest pain, palpitations and leg swelling. Gastrointestinal:  Negative for abdominal pain, blood in stool, constipation, diarrhea, nausea and vomiting.    Genitourinary:  Negative for dysuria, frequency, hematuria and urgency. Musculoskeletal:  Negative for back pain, joint pain and myalgias. Skin:  Negative for rash. Neurological:  Negative for dizziness, tremors, seizures and headaches. Endo/Heme/Allergies:  Negative for polydipsia. Does not bruise/bleed easily. Psychiatric/Behavioral:  Negative for depression and substance abuse. The patient is not nervous/anxious. Physical Exam  Vitals and nursing note reviewed. Exam conducted with a chaperone present. Constitutional:       Appearance: She is well-developed. HENT:      Head: Normocephalic and atraumatic. Right Ear: External ear normal.      Left Ear: External ear normal.      Nose: Nose normal.   Eyes:      General: No scleral icterus. Conjunctiva/sclera: Conjunctivae normal.      Pupils: Pupils are equal, round, and reactive to light. Neck:      Thyroid: No thyromegaly. Vascular: No JVD. Cardiovascular:      Rate and Rhythm: Normal rate and regular rhythm. Heart sounds: Normal heart sounds. No murmur heard. No friction rub. No gallop. Pulmonary:      Effort: Pulmonary effort is normal.      Breath sounds: Normal breath sounds. No wheezing or rales. Chest:      Chest wall: No tenderness. Breasts:     Breasts are symmetrical.      Right: No inverted nipple, mass, nipple discharge, skin change or tenderness. Left: No inverted nipple, mass, nipple discharge, skin change or tenderness. Abdominal:      General: Bowel sounds are normal. There is no distension. Palpations: Abdomen is soft. There is no mass. Tenderness: There is no abdominal tenderness. Genitourinary:     Exam position: Lithotomy position. Vagina: Vaginal discharge present. Cervix: No cervical motion tenderness, discharge or friability. Uterus: Normal.       Adnexa:         Right: No mass or tenderness. Left: No mass or tenderness. Musculoskeletal:         General: No tenderness. Normal range of motion. Cervical back: Normal range of motion and neck supple. Lymphadenopathy:      Cervical: No cervical adenopathy. Upper Body:      Right upper body: No supraclavicular adenopathy. Left upper body: No supraclavicular adenopathy. Skin:     General: Skin is warm and dry. Findings: No rash. Neurological:      Mental Status: She is alert and oriented to person, place, and time. Cranial Nerves: No cranial nerve deficit. Deep Tendon Reflexes: Reflexes are normal and symmetric. Comments: Sensations normal   Psychiatric:         Behavior: Behavior normal.         Thought Content: Thought content normal.         Judgment: Judgment normal.       ASSESSMENT and PLAN  Diagnoses and all orders for this visit:    1. Routine general medical examination at a health care facility  -     CBC WITH AUTOMATED DIFF; Future  -     HEMOGLOBIN A1C WITH EAG; Future  -     LIPID PANEL; Future  -     METABOLIC PANEL, COMPREHENSIVE; Future  -     TSH 3RD GENERATION; Future  -     URINALYSIS W/ RFLX MICROSCOPIC; Future    2. Screen for colon cancer  -     COLOGUARD TEST (FECAL DNA COLORECTAL CANCER SCREENING); Future    3. Encounter for screening mammogram for malignant neoplasm of breast  -     LAZ MAMMO BI SCREENING INCL CAD; Future    4. Well woman exam with routine gynecological exam  -     PAP IG, APTIMA HPV AND RFX 16/18,45 ([id number]); Future    Discussed lifestyle issues and health guidance given  Patient was given an after visit summary which includes diagnoses, vital signs, current medications, instructions and references & authorized prescriptions . Results of labs will be conveyed to patient, once available. Pt verbalized instructions I provided and expressed understanding of discussion that was held today. Follow-up and Dispositions    Return in about 6 months (around 9/2/2023) for follow up. Please note that this dictation was completed with [employer], the [employer] voice recognition software. Quite often unanticipated grammatical, syntax, homophones, and other interpretive errors are inadvertently transcribed by the computer software. Please disregard these errors. Please excuse any errors that have escaped final proofreading. Thank you.

## 2023-03-02 NOTE — PROGRESS NOTES
Chief Complaint   Patient presents with    Complete Physical     Follow up       1. \"Have you been to the ER, urgent care clinic since your last visit? Hospitalized since your last visit? \" No    2. \"Have you seen or consulted any other health care providers outside of the [address] [place] [address] since your last visit? \" No     3. For patients aged 39-70: Has the patient had a colonoscopy / FIT/ Cologuard? NA - based on age      If the patient is female:    4. For patients aged 41-77: Has the patient had a mammogram within the past 2 years? No      5. For patients aged 21-65: Has the patient had a pap smear? No       Financial Resource Strain: Low Risk     Difficulty of Paying Living Expenses: Not very hard      Food Insecurity: Food Insecurity Present    Worried About [address] in the Last Year: Sometimes true    Ran Out of Food in the Last Year: Sometimes true        3 most recent PHQ Screens 3/2/2023   PHQ Not Done -   Little interest or pleasure in doing things Not at all   Feeling down, depressed, irritable, or hopeless Not at all   Total Score PHQ 2 0   Trouble falling or staying asleep, or sleeping too much -   Feeling tired or having little energy -   Poor appetite, weight loss, or overeating -   Feeling bad about yourself - or that you are a failure or have let yourself or your family down -   Trouble concentrating on things such as school, work, reading, or watching TV -   Moving or speaking so slowly that other people could have noticed; or the opposite being so fidgety that others notice -   Thoughts of being better off dead, or hurting yourself in some way -   PHQ 9 Score -   How difficult have these problems made it for you to do your work, take care of your home and get along with others -         Abuse Screening Questionnaire 3/2/2023   Do you ever feel afraid of your partner? N   Are you in a relationship with someone who physically or mentally threatens you?  N   Is it safe for you to go home? Y          Health Maintenance Due   Topic Date Due    Colorectal Cancer Screening Combo  Never done    Cervical cancer screen  02/07/2023    Breast Cancer Screen Mammogram  03/10/2023

## 2023-03-03 LAB
ALBUMIN SERPL-MCNC: 4.1 G/DL (ref 3.5–5)
ALBUMIN/GLOB SERPL: 1.2 (ref 1.1–2.2)
ALP SERPL-CCNC: 86 U/L (ref 45–117)
ALT SERPL-CCNC: 36 U/L (ref 12–78)
ANION GAP SERPL CALC-SCNC: 6 MMOL/L (ref 5–15)
APPEARANCE UR: ABNORMAL
AST SERPL-CCNC: 16 U/L (ref 15–37)
BASOPHILS # BLD: 0.1 K/UL (ref 0–0.1)
BASOPHILS NFR BLD: 1 % (ref 0–1)
BILIRUB SERPL-MCNC: 0.5 MG/DL (ref 0.2–1)
BILIRUB UR QL: NEGATIVE
BUN SERPL-MCNC: 8 MG/DL (ref 6–20)
BUN/CREAT SERPL: 14 (ref 12–20)
CALCIUM SERPL-MCNC: 9.5 MG/DL (ref 8.5–10.1)
CHLORIDE SERPL-SCNC: 107 MMOL/L (ref 97–108)
CHOLEST SERPL-MCNC: 196 MG/DL
CO2 SERPL-SCNC: 28 MMOL/L (ref 21–32)
COLOR UR: ABNORMAL
CREAT SERPL-MCNC: 0.58 MG/DL (ref 0.55–1.02)
DIFFERENTIAL METHOD BLD: NORMAL
EOSINOPHIL # BLD: 0.2 K/UL (ref 0–0.4)
EOSINOPHIL NFR BLD: 2 % (ref 0–7)
ERYTHROCYTE [DISTWIDTH] IN BLOOD BY AUTOMATED COUNT: 12.5 % (ref 11.5–14.5)
EST. AVERAGE GLUCOSE BLD GHB EST-MCNC: 120 MG/DL
GLOBULIN SER CALC-MCNC: 3.5 G/DL (ref 2–4)
GLUCOSE SERPL-MCNC: 102 MG/DL (ref 65–100)
GLUCOSE UR STRIP.AUTO-MCNC: NEGATIVE MG/DL
HBA1C MFR BLD: 5.8 % (ref 4–5.6)
HCT VFR BLD AUTO: 45.1 % (ref 35–47)
HDLC SERPL-MCNC: 47 MG/DL
HDLC SERPL: 4.2 (ref 0–5)
HGB BLD-MCNC: 14.5 G/DL (ref 11.5–16)
HGB UR QL STRIP: NEGATIVE
IMM GRANULOCYTES # BLD AUTO: 0 K/UL (ref 0–0.04)
IMM GRANULOCYTES NFR BLD AUTO: 0 % (ref 0–0.5)
KETONES UR QL STRIP.AUTO: NEGATIVE MG/DL
LDLC SERPL CALC-MCNC: 132.4 MG/DL (ref 0–100)
LEUKOCYTE ESTERASE UR QL STRIP.AUTO: NEGATIVE
LYMPHOCYTES # BLD: 2.1 K/UL (ref 0.8–3.5)
LYMPHOCYTES NFR BLD: 27 % (ref 12–49)
MCH RBC QN AUTO: 29.9 PG (ref 26–34)
MCHC RBC AUTO-ENTMCNC: 32.2 G/DL (ref 30–36.5)
MCV RBC AUTO: 93 FL (ref 80–99)
MONOCYTES # BLD: 0.5 K/UL (ref 0–1)
MONOCYTES NFR BLD: 6 % (ref 5–13)
NEUTS SEG # BLD: 5.1 K/UL (ref 1.8–8)
NEUTS SEG NFR BLD: 64 % (ref 32–75)
NITRITE UR QL STRIP.AUTO: NEGATIVE
NRBC # BLD: 0 K/UL (ref 0–0.01)
NRBC BLD-RTO: 0 PER 100 WBC
PH UR STRIP: 7.5 (ref 5–8)
PLATELET # BLD AUTO: 228 K/UL (ref 150–400)
PMV BLD AUTO: 11.2 FL (ref 8.9–12.9)
POTASSIUM SERPL-SCNC: 3.8 MMOL/L (ref 3.5–5.1)
PROT SERPL-MCNC: 7.6 G/DL (ref 6.4–8.2)
PROT UR STRIP-MCNC: NEGATIVE MG/DL
RBC # BLD AUTO: 4.85 M/UL (ref 3.8–5.2)
SODIUM SERPL-SCNC: 141 MMOL/L (ref 136–145)
SP GR UR REFRACTOMETRY: 1.01 (ref 1–1.03)
TRIGL SERPL-MCNC: 83 MG/DL (ref ?–150)
TSH SERPL DL<=0.05 MIU/L-ACNC: 1.73 UIU/ML (ref 0.36–3.74)
UROBILINOGEN UR QL STRIP.AUTO: 0.2 EU/DL (ref 0.2–1)
VLDLC SERPL CALC-MCNC: 16.6 MG/DL
WBC # BLD AUTO: 7.9 K/UL (ref 3.6–11)

## 2023-03-03 NOTE — PROGRESS NOTES
Zion Arambula,  I have reviewed your results. Results for Thyroid function, kidney and liver function, cholesterol profile, average sugar, blood count, urine analysis are all very stable. No change in current medications. I will update you once I get your Pap smear report. Meanwhile please make sure you schedule your mammogram and complete colon cancer screening.   Thanks

## 2023-04-01 ENCOUNTER — PATIENT MESSAGE (OUTPATIENT)
Dept: FAMILY MEDICINE CLINIC | Age: 48
End: 2023-04-01

## 2023-04-01 DIAGNOSIS — J30.89 ENVIRONMENTAL AND SEASONAL ALLERGIES: ICD-10-CM

## 2023-04-03 RX ORDER — MINERAL OIL
180 ENEMA (ML) RECTAL DAILY
Qty: 30 TABLET | Refills: 1 | Status: SHIPPED | OUTPATIENT
Start: 2023-04-03

## 2023-04-03 NOTE — TELEPHONE ENCOUNTER
From: Alisia Pimentel  To: Geremias Flowers MD  Sent: 4/1/2023 8:01 PM EDT  Subject: Seasonal Changes    Good evening Dr. Shaye Bernabe hope you are doing well, I wanted to ask you a follow-up question based on what you said last year about allergies. During the spring and fall season, my body experiences severe body aches, headaches, and dizziness. Is this possibly from allergies or some other issue? Do you have any medication for allergies (could you repeat the name, I don't remember it particularly)or remedies for these issues? Thank you and have great weekend.

## 2023-04-22 ENCOUNTER — TRANSCRIBE ORDERS (OUTPATIENT)
Facility: HOSPITAL | Age: 48
End: 2023-04-22

## 2023-04-22 DIAGNOSIS — Z12.31 ENCOUNTER FOR SCREENING MAMMOGRAM FOR MALIGNANT NEOPLASM OF BREAST: Primary | ICD-10-CM

## 2023-04-22 DIAGNOSIS — Z12.31 VISIT FOR SCREENING MAMMOGRAM: Primary | ICD-10-CM

## 2023-05-24 RX ORDER — METFORMIN HYDROCHLORIDE 500 MG/1
TABLET, EXTENDED RELEASE ORAL
COMMUNITY
Start: 2021-12-15

## 2023-05-24 RX ORDER — ACETAMINOPHEN 500 MG
1000 TABLET ORAL EVERY 6 HOURS PRN
COMMUNITY

## 2023-05-24 RX ORDER — AMLODIPINE BESYLATE 5 MG/1
1 TABLET ORAL DAILY
COMMUNITY
Start: 2023-03-01 | End: 2023-06-06

## 2023-05-24 RX ORDER — FEXOFENADINE HCL 180 MG/1
180 TABLET ORAL DAILY
COMMUNITY
Start: 2023-04-03

## 2023-05-24 RX ORDER — OMEPRAZOLE 40 MG/1
40 CAPSULE, DELAYED RELEASE ORAL DAILY
COMMUNITY
Start: 2022-08-18

## 2023-05-24 RX ORDER — FLUTICASONE PROPIONATE 50 MCG
SPRAY, SUSPENSION (ML) NASAL
COMMUNITY
Start: 2023-01-28

## 2023-06-02 ENCOUNTER — HOSPITAL ENCOUNTER (OUTPATIENT)
Facility: HOSPITAL | Age: 48
Discharge: HOME | End: 2023-06-02
Attending: FAMILY MEDICINE
Payer: COMMERCIAL

## 2023-06-02 DIAGNOSIS — Z12.31 VISIT FOR SCREENING MAMMOGRAM: ICD-10-CM

## 2023-06-02 PROCEDURE — 77063 BREAST TOMOSYNTHESIS BI: CPT

## 2023-06-06 RX ORDER — AMLODIPINE BESYLATE 5 MG/1
TABLET ORAL
Qty: 90 TABLET | Refills: 1 | Status: SHIPPED | OUTPATIENT
Start: 2023-06-06

## 2023-09-06 ENCOUNTER — OFFICE VISIT (OUTPATIENT)
Dept: FAMILY MEDICINE CLINIC | Age: 48
End: 2023-09-06
Payer: COMMERCIAL

## 2023-09-06 VITALS
HEIGHT: 57 IN | BODY MASS INDEX: 29.73 KG/M2 | RESPIRATION RATE: 16 BRPM | DIASTOLIC BLOOD PRESSURE: 86 MMHG | TEMPERATURE: 99 F | WEIGHT: 137.8 LBS | HEART RATE: 98 BPM | SYSTOLIC BLOOD PRESSURE: 128 MMHG | OXYGEN SATURATION: 98 %

## 2023-09-06 DIAGNOSIS — Z13.1 SCREENING FOR DIABETES MELLITUS (DM): Primary | ICD-10-CM

## 2023-09-06 DIAGNOSIS — R73.03 PREDIABETES: ICD-10-CM

## 2023-09-06 DIAGNOSIS — I10 PRIMARY HYPERTENSION: ICD-10-CM

## 2023-09-06 LAB — HBA1C MFR BLD: 5.9 %

## 2023-09-06 PROCEDURE — 3074F SYST BP LT 130 MM HG: CPT | Performed by: FAMILY MEDICINE

## 2023-09-06 PROCEDURE — G8419 CALC BMI OUT NRM PARAM NOF/U: HCPCS | Performed by: FAMILY MEDICINE

## 2023-09-06 PROCEDURE — 99203 OFFICE O/P NEW LOW 30 MIN: CPT | Performed by: FAMILY MEDICINE

## 2023-09-06 PROCEDURE — 1036F TOBACCO NON-USER: CPT | Performed by: FAMILY MEDICINE

## 2023-09-06 PROCEDURE — G8427 DOCREV CUR MEDS BY ELIG CLIN: HCPCS | Performed by: FAMILY MEDICINE

## 2023-09-06 PROCEDURE — 3078F DIAST BP <80 MM HG: CPT | Performed by: FAMILY MEDICINE

## 2023-09-06 PROCEDURE — 83036 HEMOGLOBIN GLYCOSYLATED A1C: CPT | Performed by: FAMILY MEDICINE

## 2023-09-06 SDOH — ECONOMIC STABILITY: FOOD INSECURITY: WITHIN THE PAST 12 MONTHS, YOU WORRIED THAT YOUR FOOD WOULD RUN OUT BEFORE YOU GOT MONEY TO BUY MORE.: NEVER TRUE

## 2023-09-06 SDOH — ECONOMIC STABILITY: INCOME INSECURITY: HOW HARD IS IT FOR YOU TO PAY FOR THE VERY BASICS LIKE FOOD, HOUSING, MEDICAL CARE, AND HEATING?: NOT HARD AT ALL

## 2023-09-06 SDOH — ECONOMIC STABILITY: HOUSING INSECURITY
IN THE LAST 12 MONTHS, WAS THERE A TIME WHEN YOU DID NOT HAVE A STEADY PLACE TO SLEEP OR SLEPT IN A SHELTER (INCLUDING NOW)?: NO

## 2023-09-06 SDOH — ECONOMIC STABILITY: FOOD INSECURITY: WITHIN THE PAST 12 MONTHS, THE FOOD YOU BOUGHT JUST DIDN'T LAST AND YOU DIDN'T HAVE MONEY TO GET MORE.: NEVER TRUE

## 2023-09-07 ASSESSMENT — ENCOUNTER SYMPTOMS
ABDOMINAL PAIN: 0
RHINORRHEA: 0
SINUS PRESSURE: 0
EYES NEGATIVE: 1
WHEEZING: 0
SHORTNESS OF BREATH: 0
EYE PAIN: 0
SORE THROAT: 0
COUGH: 0
EYE ITCHING: 0

## 2023-09-07 NOTE — PROGRESS NOTES
Felipa Espino (:  1975) is a 57 y.o. female,New patient, here for evaluation of the following chief complaint(s):  New Patient          Subjective   SUBJECTIVE/OBJECTIVE:  HPI  47-year-old female patient here to establish care. Medical issues include hypertension-well-controlled on Norvasc 5 mg. Blood pressure today 128/86  Prediabetes her last A1c in March was 5.8    Review of Systems   Constitutional: Negative. Negative for fatigue. HENT:  Negative for congestion, ear pain, rhinorrhea, sinus pressure and sore throat. Eyes: Negative. Negative for pain and itching. Respiratory:  Negative for cough, shortness of breath and wheezing. Cardiovascular:  Negative for chest pain and palpitations. Gastrointestinal:  Negative for abdominal pain. Genitourinary:  Negative for frequency and urgency. Musculoskeletal:  Negative for gait problem. Skin:  Negative for rash. Neurological:  Negative for dizziness and headaches. Psychiatric/Behavioral:  The patient is not nervous/anxious. Objective   Physical Exam  Constitutional:       Appearance: Normal appearance. HENT:      Head: Normocephalic and atraumatic. Right Ear: Tympanic membrane, ear canal and external ear normal.      Left Ear: Tympanic membrane, ear canal and external ear normal.      Nose: Nose normal. No congestion or rhinorrhea. Mouth/Throat:      Mouth: Mucous membranes are moist.      Pharynx: Oropharynx is clear. No oropharyngeal exudate or posterior oropharyngeal erythema. Eyes:      Extraocular Movements: Extraocular movements intact. Conjunctiva/sclera: Conjunctivae normal.      Pupils: Pupils are equal, round, and reactive to light. Neck:      Vascular: No carotid bruit. Cardiovascular:      Rate and Rhythm: Normal rate and regular rhythm. Pulses: Normal pulses. Heart sounds: Normal heart sounds, S1 normal and S2 normal. No murmur heard.   Pulmonary:      Effort: Pulmonary effort is

## 2023-10-24 RX ORDER — AMLODIPINE BESYLATE 5 MG/1
5 TABLET ORAL DAILY
Qty: 90 TABLET | Refills: 1 | Status: SHIPPED | OUTPATIENT
Start: 2023-10-24

## 2023-10-24 NOTE — TELEPHONE ENCOUNTER
Medication:   Requested Prescriptions     Pending Prescriptions Disp Refills    amLODIPine (NORVASC) 5 MG tablet 90 tablet 1     Sig: Take 1 tablet by mouth daily        Last Filled:      Patient Phone Number: [phone] (home)     Last appt: 9/6/2023   Next appt: 12/6/2023    Last OARRS:        No data to display

## 2023-12-08 ENCOUNTER — OFFICE VISIT (OUTPATIENT)
Dept: FAMILY MEDICINE CLINIC | Age: 48
End: 2023-12-08
Payer: COMMERCIAL

## 2023-12-08 VITALS
OXYGEN SATURATION: 98 % | HEIGHT: 57 IN | SYSTOLIC BLOOD PRESSURE: 110 MMHG | DIASTOLIC BLOOD PRESSURE: 80 MMHG | BODY MASS INDEX: 29.51 KG/M2 | RESPIRATION RATE: 16 BRPM | HEART RATE: 93 BPM | TEMPERATURE: 98.4 F | WEIGHT: 136.8 LBS

## 2023-12-08 DIAGNOSIS — L29.9 PRURITUS OF NIPPLE: ICD-10-CM

## 2023-12-08 DIAGNOSIS — I10 PRIMARY HYPERTENSION: Primary | ICD-10-CM

## 2023-12-08 PROCEDURE — 3074F SYST BP LT 130 MM HG: CPT | Performed by: FAMILY MEDICINE

## 2023-12-08 PROCEDURE — 3079F DIAST BP 80-89 MM HG: CPT | Performed by: FAMILY MEDICINE

## 2023-12-08 PROCEDURE — 99213 OFFICE O/P EST LOW 20 MIN: CPT | Performed by: FAMILY MEDICINE

## 2023-12-08 ASSESSMENT — ENCOUNTER SYMPTOMS
ABDOMINAL PAIN: 0
WHEEZING: 0
RHINORRHEA: 0
COUGH: 0
EYES NEGATIVE: 1
EYE PAIN: 0
EYE ITCHING: 0
SINUS PRESSURE: 0
SHORTNESS OF BREATH: 0
SORE THROAT: 0

## 2023-12-08 NOTE — PROGRESS NOTES
Joby Jacob (:  1975) is a 57 y.o. female,Established patient, here for evaluation of the following chief complaint(s):  3 month f/u        Subjective   SUBJECTIVE/OBJECTIVE:  HPI  57 yr old female here for follow up  HTN  bp well controlled on amlodipine 5 mg   Pt complains of b/ nipple itching a lot , no pain    Review of Systems   Constitutional: Negative. Negative for fatigue. HENT:  Negative for congestion, ear pain, rhinorrhea, sinus pressure and sore throat. Eyes: Negative. Negative for pain and itching. Respiratory:  Negative for cough, shortness of breath and wheezing. Cardiovascular:  Negative for chest pain and palpitations. Gastrointestinal:  Negative for abdominal pain. Genitourinary:  Negative for frequency and urgency. Musculoskeletal:  Negative for gait problem. Skin:  Negative for rash. Neurological:  Negative for dizziness and headaches. Psychiatric/Behavioral:  The patient is not nervous/anxious. Objective   Physical Exam  Constitutional:       Appearance: Normal appearance. HENT:      Head: Normocephalic and atraumatic. Cardiovascular:      Rate and Rhythm: Normal rate and regular rhythm. Pulmonary:      Effort: Pulmonary effort is normal.      Breath sounds: Normal breath sounds. No wheezing. Neurological:      Mental Status: She is alert. Psychiatric:         Mood and Affect: Mood normal.                     ASSESSMENT/PLAN:  1. Primary hypertension  2. Pruritus of nipple  -     AFL - Kevin Blount MD, Obstetrics, [facility]    F/u 3 months           An electronic signature was used to authenticate this note. --Destin Ann MD     This note was generated completely or in part utilizing Dragon dictation speech recognition software. Occasionally, words are mistranscribed and despite editing, the text may contain inaccuracies due to incorrect word recognition.   If further clarification is needed please contact the office at

## 2024-01-04 ENCOUNTER — TELEPHONE (OUTPATIENT)
Dept: FAMILY MEDICINE CLINIC | Age: 49
End: 2024-01-04

## 2024-01-04 NOTE — TELEPHONE ENCOUNTER
Patient is requesting new referral  [facility] is out of network please contact patient         [employer] is the insurer

## 2024-01-04 NOTE — TELEPHONE ENCOUNTER
I informed pt that she will need to contact her insurance for a provider within her network, an call back to let us know.

## 2024-01-11 ENCOUNTER — HOSPITAL ENCOUNTER (OUTPATIENT)
Age: 49
Discharge: HOME | End: 2024-01-11
Payer: COMMERCIAL

## 2024-01-11 ENCOUNTER — HOSPITAL ENCOUNTER (OUTPATIENT)
Dept: GENERAL RADIOLOGY | Age: 49
Discharge: HOME | End: 2024-01-11
Payer: COMMERCIAL

## 2024-01-11 DIAGNOSIS — Z11.1 PPD SCREENING TEST: ICD-10-CM

## 2024-01-11 PROCEDURE — 71045 X-RAY EXAM CHEST 1 VIEW: CPT

## 2024-01-17 NOTE — LETTER
5/30/2017 5:08 PM 
 
Ms. Billee Junes [address] [place] [id number] Dear Billee Junes: 
 
Please find your most recent results below. Resulted Orders TOXASSURE SELECT 13 (MW)  
Result Value Ref Range REPORT SUMMARY FINAL Comment:  
   ==================================================================== 
TOXASSURE SELECT 13 (MW) 
==================================================================== Test                             Result       Flag       Units NO DRUGS DETECTED. 
==================================================================== Test                      Result    Flag   Units      Ref Range Creatinine              30               mg/dL      >=20 
==================================================================== Declared Medications: 
 Medication list was not provided. 
==================================================================== For clinical consultation, please call [phone]. 
==================================================================== Narrative Performed at:  [facility], [address], [address] : Estella Victoria MD, Phone:  [phone] RECOMMENDATIONS: 
The drug test was negative, please provide this letter to your school. Please call me if you have any questions: [phone] Sincerely, Brice Jacinto MD 

3 (mild pain)

## 2024-02-04 ENCOUNTER — HOSPITAL ENCOUNTER (EMERGENCY)
Age: 49
Discharge: HOME | End: 2024-02-04
Payer: COMMERCIAL

## 2024-02-04 VITALS
OXYGEN SATURATION: 95 % | HEART RATE: 78 BPM | RESPIRATION RATE: 19 BRPM | TEMPERATURE: 98.5 F | DIASTOLIC BLOOD PRESSURE: 89 MMHG | SYSTOLIC BLOOD PRESSURE: 134 MMHG

## 2024-02-04 DIAGNOSIS — S00.83XA CONTUSION OF FACE, INITIAL ENCOUNTER: ICD-10-CM
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
## 2024-02-04 PROCEDURE — 99282 EMERGENCY DEPT VISIT SF MDM: CPT

## 2024-02-04 RX ORDER — AMLODIPINE BESYLATE 2.5 MG/1
2.5 TABLET ORAL DAILY
COMMUNITY

## 2024-02-04 ASSESSMENT — PAIN - FUNCTIONAL ASSESSMENT: PAIN_FUNCTIONAL_ASSESSMENT: NONE - DENIES PAIN

## 2024-03-15 ENCOUNTER — OFFICE VISIT (OUTPATIENT)
Dept: FAMILY MEDICINE CLINIC | Age: 49
End: 2024-03-15
Payer: COMMERCIAL

## 2024-03-15 VITALS
HEIGHT: 57 IN | BODY MASS INDEX: 29.79 KG/M2 | SYSTOLIC BLOOD PRESSURE: 120 MMHG | OXYGEN SATURATION: 98 % | TEMPERATURE: 98.4 F | DIASTOLIC BLOOD PRESSURE: 78 MMHG | WEIGHT: 138.1 LBS | HEART RATE: 90 BPM

## 2024-03-15 DIAGNOSIS — I10 PRIMARY HYPERTENSION: ICD-10-CM

## 2024-03-15 DIAGNOSIS — J30.2 SEASONAL ALLERGIES: ICD-10-CM

## 2024-03-15 DIAGNOSIS — E66.3 OVERWEIGHT (BMI 25.0-29.9): ICD-10-CM

## 2024-03-15 DIAGNOSIS — R73.03 PREDIABETES: Primary | ICD-10-CM

## 2024-03-15 PROCEDURE — 99214 OFFICE O/P EST MOD 30 MIN: CPT | Performed by: FAMILY MEDICINE

## 2024-03-15 PROCEDURE — 3078F DIAST BP <80 MM HG: CPT | Performed by: FAMILY MEDICINE

## 2024-03-15 PROCEDURE — 3074F SYST BP LT 130 MM HG: CPT | Performed by: FAMILY MEDICINE

## 2024-03-15 RX ORDER — AMLODIPINE BESYLATE 5 MG/1
5 TABLET ORAL DAILY
Qty: 90 TABLET | Refills: 0 | Status: SHIPPED | OUTPATIENT
Start: 2024-03-15

## 2024-03-15 ASSESSMENT — PATIENT HEALTH QUESTIONNAIRE - PHQ9
SUM OF ALL RESPONSES TO PHQ9 QUESTIONS 1 & 2: 1
SUM OF ALL RESPONSES TO PHQ QUESTIONS 1-9: 1
SUM OF ALL RESPONSES TO PHQ QUESTIONS 1-9: 1
1. LITTLE INTEREST OR PLEASURE IN DOING THINGS: SEVERAL DAYS
1. LITTLE INTEREST OR PLEASURE IN DOING THINGS: 1
SUM OF ALL RESPONSES TO PHQ9 QUESTIONS 1 & 2: 1
2. FEELING DOWN, DEPRESSED OR HOPELESS: NOT AT ALL
SUM OF ALL RESPONSES TO PHQ QUESTIONS 1-9: 1
SUM OF ALL RESPONSES TO PHQ QUESTIONS 1-9: 1
2. FEELING DOWN, DEPRESSED OR HOPELESS: 0

## 2024-03-15 ASSESSMENT — ENCOUNTER SYMPTOMS
EYE PAIN: 0
EYES NEGATIVE: 1
COUGH: 0
SORE THROAT: 0
SHORTNESS OF BREATH: 0
EYE ITCHING: 0
WHEEZING: 0
SINUS PRESSURE: 0
ABDOMINAL PAIN: 0
RHINORRHEA: 0

## 2024-03-15 NOTE — PROGRESS NOTES
Osiel Menjivar (:  1975) is a 48 y.o. female,Established patient, here for evaluation of the following chief complaint(s):  Follow-up          Subjective   SUBJECTIVE/OBJECTIVE:  HPI  48-year-old female patient here for follow-up.  Medical issues include-  Hypertension-well-controlled on amlodipine 5 mg needs refills.  Due for blood work.  Prediabetes-last A1c 5.9 on 2023.  Complains of scratchy throat intermittently and has a dry cough.  Patient is overweight with a BMI of 29.88  Review of Systems   Constitutional: Negative.  Negative for fatigue.   HENT:  Negative for congestion, ear pain, rhinorrhea, sinus pressure and sore throat.    Eyes: Negative.  Negative for pain and itching.   Respiratory:  Negative for cough, shortness of breath and wheezing.    Cardiovascular:  Negative for chest pain and palpitations.   Gastrointestinal:  Negative for abdominal pain.   Genitourinary:  Negative for frequency and urgency.   Musculoskeletal:  Negative for gait problem.   Skin:  Negative for rash.   Neurological:  Negative for dizziness and headaches.   Psychiatric/Behavioral:  The patient is not nervous/anxious.           Objective   Physical Exam  Constitutional:       Appearance: Normal appearance.   HENT:      Head: Normocephalic and atraumatic.      Right Ear: Tympanic membrane, ear canal and external ear normal.      Left Ear: Tympanic membrane, ear canal and external ear normal.      Nose: Nose normal. No congestion or rhinorrhea.      Mouth/Throat:      Mouth: Mucous membranes are moist.      Pharynx: Oropharynx is clear. No oropharyngeal exudate or posterior oropharyngeal erythema.   Eyes:      Extraocular Movements: Extraocular movements intact.      Conjunctiva/sclera: Conjunctivae normal.      Pupils: Pupils are equal, round, and reactive to light.   Neck:      Vascular: No carotid bruit.   Cardiovascular:      Rate and Rhythm: Normal rate and regular rhythm.      Pulses: Normal pulses.      Heart

## 2024-03-18 ENCOUNTER — HOSPITAL ENCOUNTER (OUTPATIENT)
Age: 49
Discharge: HOME | End: 2024-03-18
Payer: COMMERCIAL

## 2024-03-18 DIAGNOSIS — R73.03 PREDIABETES: ICD-10-CM

## 2024-03-18 DIAGNOSIS — I10 PRIMARY HYPERTENSION: ICD-10-CM

## 2024-03-18 LAB
ALBUMIN SERPL-MCNC: 4.3 G/DL (ref 3.4–5)
ALBUMIN/GLOB SERPL: 1.3 {RATIO} (ref 1.1–2.2)
ALP SERPL-CCNC: 100 U/L (ref 40–129)
ALT SERPL-CCNC: 38 U/L (ref 10–40)
ANION GAP SERPL CALCULATED.3IONS-SCNC: 12 MMOL/L (ref 3–16)
AST SERPL-CCNC: 27 U/L (ref 15–37)
BILIRUB SERPL-MCNC: 0.6 MG/DL (ref 0–1)
BUN SERPL-MCNC: 7 MG/DL (ref 7–20)
CALCIUM SERPL-MCNC: 9.1 MG/DL (ref 8.3–10.6)
CHLORIDE SERPL-SCNC: 104 MMOL/L (ref 99–110)
CHOLEST SERPL-MCNC: 189 MG/DL (ref 0–199)
CO2 SERPL-SCNC: 24 MMOL/L (ref 21–32)
CREAT SERPL-MCNC: <0.5 MG/DL (ref 0.6–1.1)
EST. AVERAGE GLUCOSE BLD GHB EST-MCNC: 122.6 MG/DL
GFR SERPLBLD CREATININE-BSD FMLA CKD-EPI: >60 ML/MIN/{1.73_M2}
GLUCOSE SERPL-MCNC: 91 MG/DL (ref 70–99)
HBA1C MFR BLD: 5.9 %
HDLC SERPL-MCNC: 40 MG/DL (ref 40–60)
LDLC SERPL CALC-MCNC: 123 MG/DL
POTASSIUM SERPL-SCNC: 3.7 MMOL/L (ref 3.5–5.1)
PROT SERPL-MCNC: 7.6 G/DL (ref 6.4–8.2)
SODIUM SERPL-SCNC: 140 MMOL/L (ref 136–145)
TRIGL SERPL-MCNC: 129 MG/DL (ref 0–150)
VLDLC SERPL CALC-MCNC: 26 MG/DL

## 2024-03-18 PROCEDURE — 80053 COMPREHEN METABOLIC PANEL: CPT

## 2024-03-18 PROCEDURE — 83036 HEMOGLOBIN GLYCOSYLATED A1C: CPT

## 2024-03-18 PROCEDURE — 80061 LIPID PANEL: CPT

## 2024-03-18 PROCEDURE — 36415 COLL VENOUS BLD VENIPUNCTURE: CPT

## 2024-03-19 ENCOUNTER — TELEPHONE (OUTPATIENT)
Dept: FAMILY MEDICINE CLINIC | Age: 49
End: 2024-03-19

## 2024-03-30 NOTE — TELEPHONE ENCOUNTER
----- Message from Tanner Garibay sent at 9/30/2017 12:43 PM EDT -----  Regarding: Dr. Zehra Whalen  Pt stated that she is still sick and needs her note to be extended. Best contact number is [phone]. no

## 2024-06-19 ENCOUNTER — OFFICE VISIT (OUTPATIENT)
Dept: FAMILY MEDICINE CLINIC | Age: 49
End: 2024-06-19
Payer: COMMERCIAL

## 2024-06-19 VITALS
TEMPERATURE: 97.3 F | OXYGEN SATURATION: 98 % | DIASTOLIC BLOOD PRESSURE: 78 MMHG | HEIGHT: 57 IN | BODY MASS INDEX: 29.12 KG/M2 | RESPIRATION RATE: 16 BRPM | SYSTOLIC BLOOD PRESSURE: 126 MMHG | WEIGHT: 135 LBS | HEART RATE: 84 BPM

## 2024-06-19 DIAGNOSIS — E66.3 OVERWEIGHT (BMI 25.0-29.9): ICD-10-CM

## 2024-06-19 DIAGNOSIS — R73.03 PREDIABETES: Primary | ICD-10-CM

## 2024-06-19 DIAGNOSIS — I10 PRIMARY HYPERTENSION: ICD-10-CM

## 2024-06-19 PROCEDURE — 99214 OFFICE O/P EST MOD 30 MIN: CPT | Performed by: FAMILY MEDICINE

## 2024-06-19 PROCEDURE — 3078F DIAST BP <80 MM HG: CPT | Performed by: FAMILY MEDICINE

## 2024-06-19 PROCEDURE — 3074F SYST BP LT 130 MM HG: CPT | Performed by: FAMILY MEDICINE

## 2024-06-19 RX ORDER — AMLODIPINE BESYLATE 5 MG/1
5 TABLET ORAL DAILY
Qty: 90 TABLET | Refills: 0 | Status: SHIPPED | OUTPATIENT
Start: 2024-06-19

## 2024-06-19 ASSESSMENT — ENCOUNTER SYMPTOMS
EYE ITCHING: 0
EYES NEGATIVE: 1
SORE THROAT: 0
SINUS PRESSURE: 0
EYE PAIN: 0
SHORTNESS OF BREATH: 0
ABDOMINAL PAIN: 0
WHEEZING: 0
COUGH: 0
RHINORRHEA: 0

## 2024-06-19 NOTE — PROGRESS NOTES
normal. No murmur heard.  Pulmonary:      Effort: Pulmonary effort is normal. No respiratory distress.      Breath sounds: Normal breath sounds. No wheezing, rhonchi or rales.   Abdominal:      General: Bowel sounds are normal. There is no distension.      Palpations: Abdomen is soft.      Tenderness: There is no abdominal tenderness. There is no right CVA tenderness, left CVA tenderness, guarding or rebound.   Musculoskeletal:         General: No swelling or tenderness. Normal range of motion.      Cervical back: Normal range of motion and neck supple. No rigidity or tenderness.      Right lower leg: No edema.      Left lower leg: No edema.   Lymphadenopathy:      Cervical: No cervical adenopathy.   Skin:     General: Skin is warm and dry.      Findings: No bruising or erythema.   Neurological:      General: No focal deficit present.      Mental Status: She is alert and oriented to person, place, and time.   Psychiatric:         Mood and Affect: Mood normal.         Behavior: Behavior normal.                  Assessment & Plan   ASSESSMENT/PLAN:  1. Prediabetes-lifestyle changes recommended     2. Primary hypertension  -     amLODIPine (NORVASC) 5 MG tablet; Take 1 tablet by mouth daily, Disp-90 tablet, R-0Normal  3. Overweight (BMI 25.0-29.9)  lifestyle changes recommended     F/u 3 months           An electronic signature was used to authenticate this note.    --Crissy Ndiaye MD     This note was generated completely or in part utilizing Dragon dictation speech recognition software.  Occasionally, words are mistranscribed and despite editing, the text may contain inaccuracies due to incorrect word recognition.  If further clarification is needed please contact the office at [phone]

## 2024-07-11 ENCOUNTER — OFFICE VISIT (OUTPATIENT)
Age: 49
End: 2024-07-11

## 2024-07-11 VITALS
HEART RATE: 85 BPM | HEIGHT: 57 IN | TEMPERATURE: 98 F | WEIGHT: 133.9 LBS | DIASTOLIC BLOOD PRESSURE: 87 MMHG | OXYGEN SATURATION: 96 % | BODY MASS INDEX: 28.89 KG/M2 | SYSTOLIC BLOOD PRESSURE: 138 MMHG

## 2024-07-11 DIAGNOSIS — J06.9 URI WITH COUGH AND CONGESTION: Primary | ICD-10-CM

## 2024-07-11 DIAGNOSIS — R52 BODY ACHES: ICD-10-CM

## 2024-07-11 LAB
Lab: NORMAL
PERFORMING INSTRUMENT: NORMAL
QC PASS/FAIL: NORMAL
SARS-COV-2, POC: NORMAL

## 2024-07-11 ASSESSMENT — ENCOUNTER SYMPTOMS
NAUSEA: 0
WHEEZING: 0
DIARRHEA: 0
VOMITING: 0
COUGH: 1
SORE THROAT: 1
ABDOMINAL PAIN: 0
SHORTNESS OF BREATH: 0

## 2024-07-11 NOTE — PATIENT INSTRUCTIONS
COVID test is NEGATIVE.  Suspect viral upper respiratory illness.  Continue conservative care.  Ibuprofen and/or Tylenol for pain and body aches.  Use Guaifenesin over the counter to help with loosening secretions.  Consider Dextromethorphan (DM) to help with the coughing.  Can also try honey, menthol or eucalyptus oil to help reduce cough naturally.  Stay well hydrated and rested.   F/u with family doctor for continued care as needed.  Return if symptoms persist or change.  Proceed to hospital for evaluation if any worsening symptoms or concerns.

## 2024-07-11 NOTE — PROGRESS NOTES
Osiel Menjivar (:  1975) is a 49 y.o. female,New patient, here for evaluation of the following chief complaint(s):  Sinusitis (PT. C/O SLIGHT COUGH, WITH PHARYNGITIS, FATIGUE, BODY ACHES, BACK PAIN X 2 DAYS.)      ASSESSMENT/PLAN:  Visit Diagnoses and Associated Orders       URI with cough and congestion    -  Primary    POCT COVID-19, Antigen [FDU237 Custom]           Body aches        POCT COVID-19, Antigen [VNH940 Custom]                  COVID test is NEGATIVE.  Suspect viral upper respiratory illness.  Continue conservative care.  Ibuprofen and/or Tylenol for pain and body aches.  Use Guaifenesin over the counter to help with loosening secretions.  Consider Dextromethorphan (DM) to help with the coughing.  Can also try honey, menthol or eucalyptus oil to help reduce cough naturally.  Stay well hydrated and rested.   F/u with family doctor for continued care as needed.  Return if symptoms persist or change.  Proceed to hospital for evaluation if any worsening symptoms or concerns.      SUBJECTIVE/OBJECTIVE:      History provided by:  Patient   used: No    Generalized Body Aches  Severity:  Mild  Duration:  2 days  Progression:  Unchanged  Chronicity:  New  Associated symptoms: congestion, cough, fatigue and sore throat    Associated symptoms: no abdominal pain, no diarrhea, no ear pain, no fever, no headaches, no nausea, no rash, no shortness of breath, no vomiting and no wheezing        ROS: See HPI       Vitals:    24 1031   BP: 138/87   Site: Left Upper Arm   Position: Sitting   Cuff Size: Medium Adult   Pulse: 85   Temp: 98 °F (36.7 °C)   TempSrc: Oral   SpO2: 96%   Weight: 60.7 kg (133 lb 14.4 oz)   Height: 1.448 m (4' 9\")       No results found for this visit on 24.     Physical Exam  Vitals and nursing note reviewed.   Constitutional:       General: She is not in acute distress.     Appearance: She is not diaphoretic.   HENT:      Right Ear: Tympanic membrane, ear

## 2024-07-15 DIAGNOSIS — I10 PRIMARY HYPERTENSION: ICD-10-CM

## 2024-07-15 RX ORDER — AMLODIPINE BESYLATE 5 MG/1
5 TABLET ORAL DAILY
Qty: 90 TABLET | Refills: 0 | OUTPATIENT
Start: 2024-07-15

## 2024-07-22 DIAGNOSIS — I10 PRIMARY HYPERTENSION: ICD-10-CM

## 2024-07-22 NOTE — TELEPHONE ENCOUNTER
Medication:   Requested Prescriptions     Pending Prescriptions Disp Refills    amLODIPine (NORVASC) 5 MG tablet 90 tablet 0     Sig: Take 1 tablet by mouth daily        Last Filled:      Patient Phone Number: [phone] (home)     Last appt: 6/19/2024   Next appt: Visit date not found    Last OARRS:        No data to display

## 2024-07-22 NOTE — TELEPHONE ENCOUNTER
Medication and Quantity requested:   amLODIPine (NORVASC) 5 MG tablet     Last Visit  6/19/24    Pharmacy and phone number updated in EPIC:  yes  [facility]    Previous Rx pharmacy never received.  No receipt confirmed

## 2024-07-23 RX ORDER — AMLODIPINE BESYLATE 5 MG/1
5 TABLET ORAL DAILY
Qty: 30 TABLET | Refills: 1 | Status: SHIPPED | OUTPATIENT
Start: 2024-07-23

## 2024-09-12 PROBLEM — E87.6 HYPOKALEMIA: Status: RESOLVED | Noted: 2019-10-24 | Resolved: 2024-09-12

## 2024-09-12 PROBLEM — Z22.7 LTBI (LATENT TUBERCULOSIS INFECTION): Status: RESOLVED | Noted: 2017-03-24 | Resolved: 2024-09-12

## 2024-09-12 PROBLEM — R73.03 PREDIABETES: Status: ACTIVE | Noted: 2022-06-17

## 2024-09-12 PROBLEM — G43.909 MIGRAINE WITHOUT STATUS MIGRAINOSUS, NOT INTRACTABLE: Status: RESOLVED | Noted: 2017-03-24 | Resolved: 2024-09-12

## 2024-09-12 PROBLEM — G93.9 BRAIN LESION: Status: RESOLVED | Noted: 2017-03-24 | Resolved: 2024-09-12

## 2024-09-12 SDOH — HEALTH STABILITY: PHYSICAL HEALTH: ON AVERAGE, HOW MANY DAYS PER WEEK DO YOU ENGAGE IN MODERATE TO STRENUOUS EXERCISE (LIKE A BRISK WALK)?: 7 DAYS

## 2024-09-12 SDOH — HEALTH STABILITY: PHYSICAL HEALTH: ON AVERAGE, HOW MANY MINUTES DO YOU ENGAGE IN EXERCISE AT THIS LEVEL?: 30 MIN

## 2024-09-13 ENCOUNTER — OFFICE VISIT (OUTPATIENT)
Dept: FAMILY MEDICINE CLINIC | Age: 49
End: 2024-09-13
Payer: COMMERCIAL

## 2024-09-13 VITALS
HEART RATE: 78 BPM | SYSTOLIC BLOOD PRESSURE: 116 MMHG | TEMPERATURE: 98 F | OXYGEN SATURATION: 98 % | HEIGHT: 58 IN | BODY MASS INDEX: 28.97 KG/M2 | WEIGHT: 138 LBS | DIASTOLIC BLOOD PRESSURE: 80 MMHG

## 2024-09-13 DIAGNOSIS — F51.01 PRIMARY INSOMNIA: ICD-10-CM

## 2024-09-13 DIAGNOSIS — J30.2 SEASONAL ALLERGIES: ICD-10-CM

## 2024-09-13 DIAGNOSIS — Z76.89 ENCOUNTER TO ESTABLISH CARE: Primary | ICD-10-CM

## 2024-09-13 DIAGNOSIS — I10 PRIMARY HYPERTENSION: ICD-10-CM

## 2024-09-13 DIAGNOSIS — M72.2 PLANTAR FASCIITIS: ICD-10-CM

## 2024-09-13 PROCEDURE — 99215 OFFICE O/P EST HI 40 MIN: CPT | Performed by: STUDENT IN AN ORGANIZED HEALTH CARE EDUCATION/TRAINING PROGRAM

## 2024-09-13 PROCEDURE — 3079F DIAST BP 80-89 MM HG: CPT | Performed by: STUDENT IN AN ORGANIZED HEALTH CARE EDUCATION/TRAINING PROGRAM

## 2024-09-13 PROCEDURE — 3074F SYST BP LT 130 MM HG: CPT | Performed by: STUDENT IN AN ORGANIZED HEALTH CARE EDUCATION/TRAINING PROGRAM

## 2024-09-13 RX ORDER — LORATADINE 10 MG/1
10 TABLET ORAL DAILY
Qty: 90 TABLET | Refills: 1 | Status: SHIPPED | OUTPATIENT
Start: 2024-09-13

## 2024-09-13 RX ORDER — AMLODIPINE BESYLATE 5 MG/1
5 TABLET ORAL DAILY
Qty: 90 TABLET | Refills: 1 | Status: SHIPPED | OUTPATIENT
Start: 2024-09-13

## 2024-09-13 RX ORDER — LANOLIN ALCOHOL/MO/W.PET/CERES
3 CREAM (GRAM) TOPICAL DAILY
Qty: 90 TABLET | Refills: 1 | Status: SHIPPED | OUTPATIENT
Start: 2024-09-13

## 2024-09-13 SDOH — ECONOMIC STABILITY: INCOME INSECURITY: HOW HARD IS IT FOR YOU TO PAY FOR THE VERY BASICS LIKE FOOD, HOUSING, MEDICAL CARE, AND HEATING?: NOT HARD AT ALL

## 2024-09-13 SDOH — ECONOMIC STABILITY: FOOD INSECURITY: WITHIN THE PAST 12 MONTHS, THE FOOD YOU BOUGHT JUST DIDN'T LAST AND YOU DIDN'T HAVE MONEY TO GET MORE.: NEVER TRUE

## 2024-09-13 SDOH — ECONOMIC STABILITY: FOOD INSECURITY: WITHIN THE PAST 12 MONTHS, YOU WORRIED THAT YOUR FOOD WOULD RUN OUT BEFORE YOU GOT MONEY TO BUY MORE.: NEVER TRUE

## 2024-10-22 ENCOUNTER — TELEPHONE (OUTPATIENT)
Dept: FAMILY MEDICINE CLINIC | Age: 49
End: 2024-10-22

## 2024-10-22 NOTE — TELEPHONE ENCOUNTER
Patients  called and is requesting a Health Screen Report to be sent to [facility]. He did not have information to give to me stated Dr. Conde had sent his. If you have any questions he can be reached at [phone],       Please advise.

## 2024-10-24 NOTE — TELEPHONE ENCOUNTER
Patient was told we could not do physical, patient had be well done in June. This has to be once a year.

## 2025-02-17 ENCOUNTER — OFFICE VISIT (OUTPATIENT)
Age: 50
End: 2025-02-17

## 2025-02-17 VITALS
HEIGHT: 58 IN | HEART RATE: 90 BPM | DIASTOLIC BLOOD PRESSURE: 92 MMHG | WEIGHT: 138 LBS | SYSTOLIC BLOOD PRESSURE: 135 MMHG | OXYGEN SATURATION: 95 % | BODY MASS INDEX: 28.97 KG/M2

## 2025-02-17 DIAGNOSIS — M54.50 ACUTE MIDLINE LOW BACK PAIN WITHOUT SCIATICA: ICD-10-CM

## 2025-02-17 DIAGNOSIS — R05.1 ACUTE COUGH: ICD-10-CM

## 2025-02-17 DIAGNOSIS — B34.9 VIRAL ILLNESS: Primary | ICD-10-CM

## 2025-02-17 LAB
INFLUENZA A ANTIGEN, POC: NORMAL
INFLUENZA B ANTIGEN, POC: NORMAL
Lab: NORMAL
QC PASS/FAIL: NORMAL
SARS-COV-2 RDRP RESP QL NAA+PROBE: NEGATIVE

## 2025-02-17 RX ORDER — BENZONATATE 100 MG/1
100 CAPSULE ORAL 3 TIMES DAILY PRN
Qty: 30 CAPSULE | Refills: 0 | Status: SHIPPED | OUTPATIENT
Start: 2025-02-17 | End: 2025-02-27

## 2025-02-17 ASSESSMENT — ENCOUNTER SYMPTOMS
SINUS PAIN: 0
SINUS PRESSURE: 0
BACK PAIN: 1
SORE THROAT: 0
COUGH: 1
GASTROINTESTINAL NEGATIVE: 1
RHINORRHEA: 0

## 2025-02-17 NOTE — PROGRESS NOTES
2025     Osiel Menjivar (:  1975) is a 49 y.o. female, here for evaluation of the following medical concerns:    Chief Complaint   Patient presents with    Back Pain     Pt states back pain since yesterday as well.     Cough     Pt states dry cough since yesterday.      HPI  {Visit Chronic CHP (Optional):98449}    Review of Systems    Prior to Visit Medications    Medication Sig Taking? Authorizing Provider   diclofenac sodium (VOLTAREN) 1 % GEL Apply 2 g topically 4 times daily as needed for Pain  Edwina Codne, DO   loratadine (CLARITIN) 10 MG tablet Take 1 tablet by mouth daily  Edwina Conde, DO   amLODIPine (NORVASC) 5 MG tablet Take 1 tablet by mouth daily  Edwina Conde, DO   melatonin (RA MELATONIN) 3 MG TABS tablet Take 1 tablet by mouth daily  Ewdina Conde, DO   acetaminophen (TYLENOL) 500 MG tablet Take 2 tablets by mouth every 6 hours as needed  Automatic Reconciliation, Ar      Social History     Tobacco Use    Smoking status: Never    Smokeless tobacco: Never   Substance Use Topics    Alcohol use: Never    Drug use: Never      Vitals:    25 0945   BP: (!) 135/92   Site: Left Upper Arm   Position: Sitting   Cuff Size: Medium Adult   Pulse: 90   SpO2: 95%   Weight: 62.6 kg (138 lb)   Height: 1.473 m (4' 10\")     Estimated body mass index is 28.84 kg/m² as calculated from the following:    Height as of this encounter: 1.473 m (4' 10\").    Weight as of this encounter: 62.6 kg (138 lb).    Physical Exam    ASSESSMENT/PLAN:  There are no diagnoses linked to this encounter.    No follow-ups on file.    
(138 lb)   Height: 1.473 m (4' 10\")     Estimated body mass index is 28.84 kg/m² as calculated from the following:    Height as of this encounter: 1.473 m (4' 10\").    Weight as of this encounter: 62.6 kg (138 lb).    Physical Exam  Vitals and nursing note reviewed.   Constitutional:       General: She is awake. She is not in acute distress.     Appearance: Normal appearance. She is well-developed, well-groomed and normal weight. She is not ill-appearing, toxic-appearing or diaphoretic.   HENT:      Head: Normocephalic.      Right Ear: Tympanic membrane, ear canal and external ear normal.      Left Ear: Tympanic membrane, ear canal and external ear normal.      Nose: Nose normal. No congestion or rhinorrhea.      Mouth/Throat:      Lips: Pink.      Mouth: Mucous membranes are moist.      Pharynx: Oropharynx is clear. Uvula midline.      Tonsils: No tonsillar exudate.   Cardiovascular:      Rate and Rhythm: Normal rate and regular rhythm.      Heart sounds: Normal heart sounds, S1 normal and S2 normal. No murmur heard.  Pulmonary:      Effort: Pulmonary effort is normal.      Breath sounds: Normal breath sounds and air entry.   Musculoskeletal:      Cervical back: Normal, full passive range of motion without pain, normal range of motion and neck supple.      Thoracic back: Normal.      Lumbar back: Normal.      Right lower leg: No edema.      Left lower leg: No edema.   Skin:     General: Skin is warm and dry.      Capillary Refill: Capillary refill takes less than 2 seconds.   Neurological:      General: No focal deficit present.      Mental Status: She is alert.   Psychiatric:         Mood and Affect: Mood normal.         Behavior: Behavior is cooperative.     ASSESSMENT/PLAN:  1. Viral illness  Recommend drinking plenty of fluids, rest as much as possible, tylenol or motrin as needed for body aches, pain or fever.    May also use other the counter cold and flu medications such as NyQuil or DayQuil.  If you have a

## 2025-04-14 PROBLEM — E78.00 PURE HYPERCHOLESTEROLEMIA: Status: ACTIVE | Noted: 2025-04-14

## 2025-04-15 NOTE — PROGRESS NOTES
Office Note: Annual Physical  2025  Patient Name: Osiel Menjivar  MRN: [id number] : 1975      SUBJECTIVE:     CHIEF COMPLAINT:  Chief Complaint   Patient presents with    Annual Exam     Pt has dry cough.  Allergy meds do not work.  Started 3-4 mths ago.  Left knee pain started 2 mths ago.  Right foot is swollen.       HISTORY OF PRESENT ILLNESS:  She presents today for an annual physical.    Health Maintenance Due   Topic Date Due    COVID-19 Vaccine (5 - 2024-25 season) 2024    A1C test (Diabetic or Prediabetic)  2025     Diet/Exercise: okay; eats a lot of rice  Dentist: not up to date   Eye Doctor: not up to date     Tobacco Use: nonsmoker   Lung Cancer Screen? N/A   AAA Screen? N/A  Other substance use: none    Birth Control: none  Menstrual Cycles: irregular  Last pap: 2023  Last mammogram: Due in [place]    Last colon cancer screening: Cologuard 2023    Depression screen: completed  Labs Reviewed: ordered    Acute Concerns/Follow Ups:    History of Present Illness  The patient presents for evaluation of a persistent dry cough, left knee pain, weight gain, and irregular menstrual cycles.    A persistent dry cough has been experienced for a long time, particularly pronounced at night. Occasional acid reflux and a constant itchy sensation are also reported. Despite attempts to manage the cough with allergy medications, there has been no improvement.    Sharp pain in the left knee has been present for the past 2 to 3 months, especially noticeable when ascending or descending stairs. The pain is localized to the entire knee, with no specific area of discomfort. No swelling has been noted in the knee, and no attempts to manage the pain with ibuprofen or Tylenol have been made. An ice pack has been used to alleviate the pain. A recent onset of swelling in the knee has been present for the past 3 to 4 days, but the condition appears to be improving. No history of twisting the knee is

## 2025-04-17 ASSESSMENT — PATIENT HEALTH QUESTIONNAIRE - PHQ9
SUM OF ALL RESPONSES TO PHQ QUESTIONS 1-9: 0
SUM OF ALL RESPONSES TO PHQ QUESTIONS 1-9: 0
2. FEELING DOWN, DEPRESSED OR HOPELESS: NOT AT ALL
2. FEELING DOWN, DEPRESSED OR HOPELESS: NOT AT ALL
1. LITTLE INTEREST OR PLEASURE IN DOING THINGS: NOT AT ALL
SUM OF ALL RESPONSES TO PHQ QUESTIONS 1-9: 0
SUM OF ALL RESPONSES TO PHQ QUESTIONS 1-9: 0
1. LITTLE INTEREST OR PLEASURE IN DOING THINGS: NOT AT ALL
SUM OF ALL RESPONSES TO PHQ9 QUESTIONS 1 & 2: 0

## 2025-04-18 ENCOUNTER — OFFICE VISIT (OUTPATIENT)
Dept: FAMILY MEDICINE CLINIC | Age: 50
End: 2025-04-18

## 2025-04-18 VITALS
DIASTOLIC BLOOD PRESSURE: 80 MMHG | TEMPERATURE: 98.4 F | SYSTOLIC BLOOD PRESSURE: 116 MMHG | OXYGEN SATURATION: 98 % | RESPIRATION RATE: 16 BRPM | HEART RATE: 80 BPM | WEIGHT: 146 LBS | BODY MASS INDEX: 30.64 KG/M2 | HEIGHT: 58 IN

## 2025-04-18 DIAGNOSIS — E78.00 PURE HYPERCHOLESTEROLEMIA: ICD-10-CM

## 2025-04-18 DIAGNOSIS — R73.03 PREDIABETES: ICD-10-CM

## 2025-04-18 DIAGNOSIS — I10 HTN, GOAL BELOW 140/90: ICD-10-CM

## 2025-04-18 DIAGNOSIS — Z71.82 EXERCISE COUNSELING: ICD-10-CM

## 2025-04-18 DIAGNOSIS — Z23 NEED FOR VACCINATION: ICD-10-CM

## 2025-04-18 DIAGNOSIS — Z11.1 SCREENING FOR TUBERCULOSIS: ICD-10-CM

## 2025-04-18 DIAGNOSIS — Z12.31 ENCOUNTER FOR SCREENING MAMMOGRAM FOR MALIGNANT NEOPLASM OF BREAST: ICD-10-CM

## 2025-04-18 DIAGNOSIS — M70.52 PATELLAR BURSITIS OF LEFT KNEE: ICD-10-CM

## 2025-04-18 DIAGNOSIS — Z71.3 DIETARY COUNSELING: ICD-10-CM

## 2025-04-18 DIAGNOSIS — R05.3 CHRONIC COUGH: ICD-10-CM

## 2025-04-18 DIAGNOSIS — Z00.00 ANNUAL PHYSICAL EXAM: Primary | ICD-10-CM

## 2025-04-18 LAB
ALBUMIN SERPL-MCNC: 4.1 G/DL (ref 3.4–5)
ALBUMIN/GLOB SERPL: 1.5 {RATIO} (ref 1.1–2.2)
ALP SERPL-CCNC: 118 U/L (ref 40–129)
ALT SERPL-CCNC: 49 U/L (ref 10–40)
ANION GAP SERPL CALCULATED.3IONS-SCNC: 10 MMOL/L (ref 3–16)
AST SERPL-CCNC: 32 U/L (ref 15–37)
BILIRUB SERPL-MCNC: 0.5 MG/DL (ref 0–1)
BUN SERPL-MCNC: 8 MG/DL (ref 7–20)
CALCIUM SERPL-MCNC: 8.9 MG/DL (ref 8.3–10.6)
CHLORIDE SERPL-SCNC: 104 MMOL/L (ref 99–110)
CHOLEST SERPL-MCNC: 178 MG/DL (ref 0–199)
CO2 SERPL-SCNC: 25 MMOL/L (ref 21–32)
CREAT SERPL-MCNC: 0.5 MG/DL (ref 0.6–1.1)
EST. AVERAGE GLUCOSE BLD GHB EST-MCNC: 134.1 MG/DL
GFR SERPLBLD CREATININE-BSD FMLA CKD-EPI: >90 ML/MIN/{1.73_M2}
GLUCOSE SERPL-MCNC: 100 MG/DL (ref 70–99)
HBA1C MFR BLD: 6.3 %
HDLC SERPL-MCNC: 46 MG/DL (ref 40–60)
LDL CHOLESTEROL: 114 MG/DL
POTASSIUM SERPL-SCNC: 3.9 MMOL/L (ref 3.5–5.1)
PROT SERPL-MCNC: 6.8 G/DL (ref 6.4–8.2)
SODIUM SERPL-SCNC: 139 MMOL/L (ref 136–145)
TRIGL SERPL-MCNC: 90 MG/DL (ref 0–150)
VLDLC SERPL CALC-MCNC: 18 MG/DL

## 2025-04-18 RX ORDER — FAMOTIDINE 20 MG/1
20 TABLET, FILM COATED ORAL 2 TIMES DAILY PRN
Qty: 60 TABLET | Refills: 1 | Status: SHIPPED | OUTPATIENT
Start: 2025-04-18

## 2025-04-18 SDOH — ECONOMIC STABILITY: FOOD INSECURITY: WITHIN THE PAST 12 MONTHS, YOU WORRIED THAT YOUR FOOD WOULD RUN OUT BEFORE YOU GOT MONEY TO BUY MORE.: NEVER TRUE

## 2025-04-18 SDOH — ECONOMIC STABILITY: FOOD INSECURITY: WITHIN THE PAST 12 MONTHS, THE FOOD YOU BOUGHT JUST DIDN'T LAST AND YOU DIDN'T HAVE MONEY TO GET MORE.: NEVER TRUE

## 2025-04-21 ENCOUNTER — HOSPITAL ENCOUNTER (OUTPATIENT)
Age: 50
Discharge: HOME | End: 2025-04-21
Payer: COMMERCIAL

## 2025-04-21 ENCOUNTER — HOSPITAL ENCOUNTER (OUTPATIENT)
Dept: GENERAL RADIOLOGY | Age: 50
Discharge: HOME | End: 2025-04-21
Payer: COMMERCIAL

## 2025-04-21 DIAGNOSIS — I10 PRIMARY HYPERTENSION: ICD-10-CM

## 2025-04-21 DIAGNOSIS — Z11.1 SCREENING FOR TUBERCULOSIS: ICD-10-CM

## 2025-04-21 PROCEDURE — 71046 X-RAY EXAM CHEST 2 VIEWS: CPT

## 2025-04-21 RX ORDER — AMLODIPINE BESYLATE 5 MG/1
5 TABLET ORAL DAILY
Qty: 90 TABLET | Refills: 1 | Status: SHIPPED | OUTPATIENT
Start: 2025-04-21

## 2025-04-22 ENCOUNTER — RESULTS FOLLOW-UP (OUTPATIENT)
Dept: FAMILY MEDICINE CLINIC | Age: 50
End: 2025-04-22

## 2025-04-22 ENCOUNTER — PATIENT MESSAGE (OUTPATIENT)
Dept: FAMILY MEDICINE CLINIC | Age: 50
End: 2025-04-22

## 2025-04-22 LAB
VZV IGG SER IA-ACNC: 5.99 S/CO
VZV IGM SER IA-ACNC: 0.51 ISR

## 2025-04-22 NOTE — TELEPHONE ENCOUNTER
Pt called in returning our call.  She is at work.  I let her know we sent a [employer] message and about the ppwk being ready.  She said she would come in on Thursday when she is off for her ppwk and she will check mychart.

## 2025-06-25 ENCOUNTER — OFFICE VISIT (OUTPATIENT)
Dept: FAMILY MEDICINE CLINIC | Age: 50
End: 2025-06-25
Payer: COMMERCIAL

## 2025-06-25 VITALS
WEIGHT: 146.6 LBS | DIASTOLIC BLOOD PRESSURE: 85 MMHG | HEART RATE: 85 BPM | BODY MASS INDEX: 30.77 KG/M2 | TEMPERATURE: 98.6 F | HEIGHT: 58 IN | OXYGEN SATURATION: 98 % | SYSTOLIC BLOOD PRESSURE: 121 MMHG

## 2025-06-25 DIAGNOSIS — R05.3 CHRONIC COUGH: Primary | ICD-10-CM

## 2025-06-25 DIAGNOSIS — M79.89 SWELLING OF LOWER EXTREMITY: ICD-10-CM

## 2025-06-25 PROCEDURE — 99214 OFFICE O/P EST MOD 30 MIN: CPT | Performed by: NURSE PRACTITIONER

## 2025-06-25 PROCEDURE — 3074F SYST BP LT 130 MM HG: CPT | Performed by: NURSE PRACTITIONER

## 2025-06-25 PROCEDURE — 3079F DIAST BP 80-89 MM HG: CPT | Performed by: NURSE PRACTITIONER

## 2025-06-25 RX ORDER — MONTELUKAST SODIUM 10 MG/1
10 TABLET ORAL DAILY
Qty: 30 TABLET | Refills: 0 | Status: SHIPPED | OUTPATIENT
Start: 2025-06-25

## 2025-06-25 RX ORDER — HYDROCHLOROTHIAZIDE 12.5 MG/1
12.5 CAPSULE ORAL EVERY MORNING
Qty: 10 CAPSULE | Refills: 0 | Status: SHIPPED | OUTPATIENT
Start: 2025-06-25 | End: 2025-07-05

## 2025-06-25 NOTE — PROGRESS NOTES
Osiel Menjivar  : 1975  Encounter date: 2025    This cristo 50 y.o. female who presents with  Chief Complaint   Patient presents with    Foot Swelling     Swelling in both feet and lower legs for 4-5 days. Discomfort after walking for a while. Middle of back pain. 4-5 days Taking ibuprofen        History of present illness:    HPI PT is 50 year old female with spouse for concerns regarding new onset bilateral LE swelling, ankles and feet started 5 days ago while traveling.  Pt reports driving several hours for several days.  Denies pain or redness, denies history of blood clots.  Reports improved with elevation.  PT reports also eating more fast food in last couple weeks due to travels.  Pt also reports being treated by PCP with claritin for chronic, non-productive cough > 6 months.  Denies dyspnea, wheezing or history of asthma, denies GERD or post nasal drip.  No other concerns at this time.    Current Outpatient Medications on File Prior to Visit   Medication Sig Dispense Refill    amLODIPine (NORVASC) 5 MG tablet TAKE ONE TABLET BY MOUTH ONCE A DAY 90 tablet 1    famotidine (PEPCID) 20 MG tablet Take 1 tablet by mouth 2 times daily as needed (Acid reflux) 60 tablet 1    diclofenac sodium (VOLTAREN) 1 % GEL Apply 2 g topically 4 times daily as needed for Pain 350 g 2     No current facility-administered medications on file prior to visit.      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

## 2025-07-29 ENCOUNTER — OFFICE VISIT (OUTPATIENT)
Dept: FAMILY MEDICINE CLINIC | Age: 50
End: 2025-07-29
Payer: COMMERCIAL

## 2025-07-29 VITALS — WEIGHT: 145.6 LBS | OXYGEN SATURATION: 98 % | TEMPERATURE: 101.9 F | BODY MASS INDEX: 30.43 KG/M2 | HEART RATE: 122 BPM

## 2025-07-29 DIAGNOSIS — J06.9 VIRAL URI: Primary | ICD-10-CM

## 2025-07-29 DIAGNOSIS — R05.3 CHRONIC COUGH: ICD-10-CM

## 2025-07-29 DIAGNOSIS — R50.9 FEVER, UNSPECIFIED: ICD-10-CM

## 2025-07-29 LAB
INFLUENZA A ANTIGEN, POC: NEGATIVE
INFLUENZA B ANTIGEN, POC: NEGATIVE
LOT EXPIRE DATE: NORMAL
LOT KIT NUMBER: NORMAL
SARS-COV-2, POC: NORMAL
VALID INTERNAL CONTROL: YES
VENDOR AND KIT NAME POC: NORMAL

## 2025-07-29 PROCEDURE — 99213 OFFICE O/P EST LOW 20 MIN: CPT | Performed by: FAMILY MEDICINE

## 2025-07-29 PROCEDURE — 87428 SARSCOV & INF VIR A&B AG IA: CPT | Performed by: FAMILY MEDICINE

## 2025-07-29 NOTE — PROGRESS NOTES
Chief Complaint   Patient presents with    Pharyngitis     Started feeling sick yesterday. With ST[facility] to talk. Coughing a lot - dry. HA. Chest pain. Feeling cold. No sob.         Home Covid or Flu test :Not Tested    Smoker: non-smoker      Has had chronic cough even before this, tried med but not helping, would like to see ENT.      Vitals:    07/29/25 1548   Pulse: (!) 122   Temp: (!) 101.9 °F (38.8 °C)   TempSrc: Infrared   SpO2: 98%   Weight: 66 kg (145 lb 9.6 oz)     Wt Readings from Last 3 Encounters:   07/29/25 66 kg (145 lb 9.6 oz)   06/25/25 66.5 kg (146 lb 9.6 oz)   04/18/25 66.2 kg (146 lb)     Body mass index is 30.43 kg/m².    Alert and oriented x 4 NAD, affect appropriate and overweight, well hydrated, well developed.  Left TM nl, canal nl and pinna nl  Right TM nl, canal nl and pinna nl  No nodes neck  Nares red and congested, clear drainage  OP very red erythema and swollen posterior pharynx but not pillars,  no exudate  Lung clear with good air movement and effort  CV tachy, regular      ASSESSMENT AND PLAN:       Osiel was seen today for pharyngitis.    Diagnoses and all orders for this visit:    Viral URI  -     POCT COVID-19 & Influenza A/B    Fever, unspecified  -     POCT COVID-19 & Influenza A/B    Chronic cough  -     [facility]      COVID:  negative  Influenza A: negative  Influenza B: negative      Covid negative, wonder if too soon to turn +.  Attempted to call pt with result but had to leave , sent my chart message. Recommended rechecking tomorrow and if positive will send paxlovid. Discussed needs to isolate regardless due to fever and until fever free x 24 hrs. Sx tx for other sx.

## 2025-07-30 ENCOUNTER — OFFICE VISIT (OUTPATIENT)
Dept: FAMILY MEDICINE CLINIC | Age: 50
End: 2025-07-30
Payer: COMMERCIAL

## 2025-07-30 VITALS — RESPIRATION RATE: 16 BRPM | OXYGEN SATURATION: 96 % | HEART RATE: 108 BPM | TEMPERATURE: 99.1 F

## 2025-07-30 DIAGNOSIS — R05.1 ACUTE COUGH: ICD-10-CM

## 2025-07-30 DIAGNOSIS — R50.9 FEVER, UNSPECIFIED FEVER CAUSE: ICD-10-CM

## 2025-07-30 DIAGNOSIS — U07.1 COVID: Primary | ICD-10-CM

## 2025-07-30 DIAGNOSIS — J10.1 INFLUENZA B: ICD-10-CM

## 2025-07-30 LAB
INFLUENZA A ANTIGEN, POC: NEGATIVE
INFLUENZA B ANTIGEN, POC: POSITIVE
LOT EXPIRE DATE: ABNORMAL
LOT KIT NUMBER: ABNORMAL
SARS-COV-2 RNA, POC: POSITIVE
VALID INTERNAL CONTROL: ABNORMAL
VENDOR AND KIT NAME POC: ABNORMAL

## 2025-07-30 PROCEDURE — 99213 OFFICE O/P EST LOW 20 MIN: CPT | Performed by: NURSE PRACTITIONER

## 2025-07-30 PROCEDURE — 87428 SARSCOV & INF VIR A&B AG IA: CPT | Performed by: NURSE PRACTITIONER

## 2025-07-30 RX ORDER — BENZONATATE 100 MG/1
100-200 CAPSULE ORAL 3 TIMES DAILY PRN
Qty: 60 CAPSULE | Refills: 0 | Status: SHIPPED | OUTPATIENT
Start: 2025-07-30 | End: 2025-08-06

## 2025-07-30 ASSESSMENT — ENCOUNTER SYMPTOMS
NAUSEA: 0
VOMITING: 0
COUGH: 0
SHORTNESS OF BREATH: 0
DIARRHEA: 0

## 2025-07-30 NOTE — PROGRESS NOTES
Osiel Menjivar  : 1975  Encounter date: 2025    This is a 50 y.o. female who presents with  Chief Complaint   Patient presents with    Cough    Pharyngitis     History of present illness:    HPI   Presents to clinic today with concerns for fever/chills/muscle aches along with URI symptoms that started approximately 2 days prior.  Fever yesterday at 101.  Has been taking Ibuprofen with no relief.  She did have trouble sleeping with cough overnight.  She did have a Flu/Covid test yesterday that was negative.  Generally speaking feeling worse today.  No other sick contacts.     Allergies   Allergen Reactions    Environmental/Seasonal      cough     Current Outpatient Medications   Medication Sig Dispense Refill    nirmatrelvir/ritonavir 300/100 (PAXLOVID, 300/100,) 20 x 150 MG & 10 x 100MG TBPK Take 3 tablets (two 150 mg nirmatrelvir and one 100 mg ritonavir tablets) by mouth every 12 hours for 5 days. 30 tablet 0    benzonatate (TESSALON) 100 MG capsule Take 1-2 capsules by mouth 3 times daily as needed for Cough 60 capsule 0    amLODIPine (NORVASC) 5 MG tablet TAKE ONE TABLET BY MOUTH ONCE A DAY 90 tablet 1     No current facility-administered medications for this visit.      Review of Systems   Constitutional:  Negative for activity change, appetite change, chills, fatigue and fever.   Respiratory:  Negative for cough and shortness of breath.    Cardiovascular:  Negative for chest pain and palpitations.   Gastrointestinal:  Negative for diarrhea, nausea and vomiting.     Past medical, surgical, family and social history were reviewed and updated with the patient.    Objective:    Pulse (!) 108   Temp 99.1 °F (37.3 °C) (Infrared)   Resp 16   SpO2 96%         BP Readings from Last 3 Encounters:   25 121/85   25 116/80   25 (!) 135/92     Wt Readings from Last 3 Encounters:   25 66 kg (145 lb 9.6 oz)   25 66.5 kg (146 lb 9.6 oz)   25 66.2 kg (146 lb)     Physical

## 2025-08-19 ENCOUNTER — OFFICE VISIT (OUTPATIENT)
Dept: FAMILY MEDICINE CLINIC | Age: 50
End: 2025-08-19
Payer: COMMERCIAL

## 2025-08-19 VITALS
SYSTOLIC BLOOD PRESSURE: 126 MMHG | BODY MASS INDEX: 30.01 KG/M2 | TEMPERATURE: 98.1 F | DIASTOLIC BLOOD PRESSURE: 82 MMHG | WEIGHT: 143.6 LBS | HEART RATE: 88 BPM | OXYGEN SATURATION: 97 %

## 2025-08-19 DIAGNOSIS — M79.89 LOCALIZED SWELLING OF BOTH LOWER EXTREMITIES: Primary | ICD-10-CM

## 2025-08-19 DIAGNOSIS — R05.3 PERSISTENT COUGH: ICD-10-CM

## 2025-08-19 DIAGNOSIS — N92.6 IRREGULAR MENSES: ICD-10-CM

## 2025-08-19 DIAGNOSIS — I10 ESSENTIAL HYPERTENSION: ICD-10-CM

## 2025-08-19 PROCEDURE — 3074F SYST BP LT 130 MM HG: CPT | Performed by: STUDENT IN AN ORGANIZED HEALTH CARE EDUCATION/TRAINING PROGRAM

## 2025-08-19 PROCEDURE — 99214 OFFICE O/P EST MOD 30 MIN: CPT | Performed by: STUDENT IN AN ORGANIZED HEALTH CARE EDUCATION/TRAINING PROGRAM

## 2025-08-19 PROCEDURE — 3079F DIAST BP 80-89 MM HG: CPT | Performed by: STUDENT IN AN ORGANIZED HEALTH CARE EDUCATION/TRAINING PROGRAM

## 2025-08-19 RX ORDER — BENZONATATE 100 MG/1
100 CAPSULE ORAL 3 TIMES DAILY PRN
COMMUNITY

## 2025-08-19 RX ORDER — DOXYCYCLINE HYCLATE 100 MG
100 TABLET ORAL 2 TIMES DAILY
Qty: 10 TABLET | Refills: 0 | Status: SHIPPED | OUTPATIENT
Start: 2025-08-19 | End: 2025-08-24

## 2025-08-19 RX ORDER — HYDROCHLOROTHIAZIDE 12.5 MG/1
12.5 CAPSULE ORAL EVERY MORNING
Qty: 90 CAPSULE | Refills: 1 | Status: SHIPPED | OUTPATIENT
Start: 2025-08-19

## (undated) DEVICE — TRAY PREP DRY W/ PREM GLV 2 APPL 6 SPNG 2 UNDPD 1 OVERWRAP

## (undated) DEVICE — TOWEL SURG W17XL27IN STD BLU COT NONFENESTRATED PREWASHED

## (undated) DEVICE — SPONGE GZ W4XL4IN COT RADPQ HIGHLY ABSRB

## (undated) DEVICE — HANDLE LT SNAP ON ULT DURABLE LENS FOR TRUMPF ALC DISPOSABLE

## (undated) DEVICE — GOWN,SIRUS,NONRNF,SETINSLV,XL,20/CS: Brand: MEDLINE

## (undated) DEVICE — GARMENT,MEDLINE,DVT,INT,CALF,MED, GEN2: Brand: MEDLINE

## (undated) DEVICE — INFECTION CONTROL KIT SYS

## (undated) DEVICE — SWAB MEDICATED 8 IN PROCTO

## (undated) DEVICE — DRAPE,LITHOTOMY,STERILE: Brand: MEDLINE

## (undated) DEVICE — STERILE POLYISOPRENE POWDER-FREE SURGICAL GLOVES WITH EMOLLIENT COATING: Brand: PROTEXIS

## (undated) DEVICE — COVER,TABLE,60X90,STERILE: Brand: MEDLINE

## (undated) DEVICE — STRAP,POSITIONING,KNEE/BODY,FOAM,4X60": Brand: MEDLINE

## (undated) DEVICE — Z INACTIVE USE 2527070 DRAPE SURG W40XL44IN UNDERBUTTOCK SMS POLYPR W/ PCH BK DISP

## (undated) DEVICE — PAD,SANITARY,11 IN,MAXI,N-STRL,IND WRAP: Brand: MEDLINE

## (undated) DEVICE — MARKER,SKIN,WI/RULER AND LABELS: Brand: MEDLINE